# Patient Record
Sex: MALE | Race: WHITE | NOT HISPANIC OR LATINO | Employment: FULL TIME | URBAN - METROPOLITAN AREA
[De-identification: names, ages, dates, MRNs, and addresses within clinical notes are randomized per-mention and may not be internally consistent; named-entity substitution may affect disease eponyms.]

---

## 2018-05-02 ENCOUNTER — CONVERSION ENCOUNTER (OUTPATIENT)
Dept: FAMILY MEDICINE CLINIC | Facility: CLINIC | Age: 38
End: 2018-05-02

## 2018-05-02 ENCOUNTER — OFFICE VISIT CONVERTED (OUTPATIENT)
Dept: FAMILY MEDICINE CLINIC | Facility: CLINIC | Age: 38
End: 2018-05-02
Attending: NURSE PRACTITIONER

## 2020-01-09 ENCOUNTER — HOSPITAL ENCOUNTER (OUTPATIENT)
Dept: URGENT CARE | Facility: CLINIC | Age: 40
Discharge: HOME | End: 2020-01-09
Attending: EMERGENCY MEDICINE

## 2020-01-09 LAB
ANION GAP SERPL CALC-SCNC: 14 MMOL/L (ref 8–19)
BUN SERPL-MCNC: 3 MG/DL (ref 5–25)
BUN/CREAT SERPL: 3 {RATIO} (ref 6–20)
CALCIUM SERPL-MCNC: 9.5 MG/DL (ref 8.7–10.4)
CHLORIDE SERPL-SCNC: 101 MMOL/L (ref 99–111)
CONV CO2: 26 MMOL/L (ref 22–32)
CREAT UR-MCNC: 0.87 MG/DL (ref 0.7–1.2)
GFR SERPLBLD BASED ON 1.73 SQ M-ARVRAT: >60 ML/MIN/{1.73_M2}
GLUCOSE SERPL-MCNC: 93 MG/DL (ref 70–99)
OSMOLALITY SERPL CALC.SUM OF ELEC: 280 MOSM/KG (ref 273–304)
POTASSIUM SERPL-SCNC: 4.2 MMOL/L (ref 3.5–5.3)
SODIUM SERPL-SCNC: 137 MMOL/L (ref 135–147)

## 2021-05-16 VITALS
TEMPERATURE: 97.2 F | SYSTOLIC BLOOD PRESSURE: 140 MMHG | DIASTOLIC BLOOD PRESSURE: 86 MMHG | WEIGHT: 197.44 LBS | RESPIRATION RATE: 14 BRPM | HEIGHT: 70 IN | BODY MASS INDEX: 28.27 KG/M2 | HEART RATE: 98 BPM | SYSTOLIC BLOOD PRESSURE: 151 MMHG | OXYGEN SATURATION: 96 % | DIASTOLIC BLOOD PRESSURE: 87 MMHG

## 2021-08-23 ENCOUNTER — HOSPITAL ENCOUNTER (EMERGENCY)
Facility: HOSPITAL | Age: 41
Discharge: HOME | End: 2021-08-23
Attending: EMERGENCY MEDICINE | Admitting: EMERGENCY MEDICINE

## 2021-08-23 VITALS
HEART RATE: 92 BPM | SYSTOLIC BLOOD PRESSURE: 140 MMHG | OXYGEN SATURATION: 97 % | BODY MASS INDEX: 28.44 KG/M2 | TEMPERATURE: 98.1 F | RESPIRATION RATE: 16 BRPM | DIASTOLIC BLOOD PRESSURE: 90 MMHG | WEIGHT: 210 LBS | HEIGHT: 72 IN

## 2021-08-23 DIAGNOSIS — Z20.822 SUSPECTED COVID-19 VIRUS INFECTION: Primary | ICD-10-CM

## 2021-08-23 PROCEDURE — 99283 EMERGENCY DEPT VISIT LOW MDM: CPT

## 2021-08-23 PROCEDURE — C9803 HOPD COVID-19 SPEC COLLECT: HCPCS

## 2021-08-23 PROCEDURE — U0003 INFECTIOUS AGENT DETECTION BY NUCLEIC ACID (DNA OR RNA); SEVERE ACUTE RESPIRATORY SYNDROME CORONAVIRUS 2 (SARS-COV-2) (CORONAVIRUS DISEASE [COVID-19]), AMPLIFIED PROBE TECHNIQUE, MAKING USE OF HIGH THROUGHPUT TECHNOLOGIES AS DESCRIBED BY CMS-2020-01-R: HCPCS | Performed by: EMERGENCY MEDICINE

## 2021-08-23 RX ORDER — ONDANSETRON 4 MG/1
4 TABLET, ORALLY DISINTEGRATING ORAL 4 TIMES DAILY PRN
Qty: 10 TABLET | Refills: 0 | OUTPATIENT
Start: 2021-08-23 | End: 2023-02-17

## 2021-08-23 RX ORDER — ONDANSETRON 4 MG/1
4 TABLET, ORALLY DISINTEGRATING ORAL ONCE
Status: DISCONTINUED | OUTPATIENT
Start: 2021-08-23 | End: 2021-08-24 | Stop reason: HOSPADM

## 2021-08-23 RX ORDER — SODIUM CHLORIDE 0.9 % (FLUSH) 0.9 %
10 SYRINGE (ML) INJECTION AS NEEDED
Status: DISCONTINUED | OUTPATIENT
Start: 2021-08-23 | End: 2021-08-24 | Stop reason: HOSPADM

## 2021-08-24 LAB — SARS-COV-2 RNA RESP QL NAA+PROBE: NOT DETECTED

## 2021-08-24 NOTE — ED PROVIDER NOTES
"Time: 9:17 PM EDT  Arrived by: private car  Chief Complaint:   Chief Complaint   Patient presents with   • Abdominal Pain     abdominal pain for two days   • Weakness - Generalized   • Muscle Pain   • Vomiting   • Nausea     History provided by: pt  History is limited by: N/A     History of Present Illness:  Patient is a 41 y.o. year old male that presents to the emergency department with ABDOMINAL PAIN.    Pt states two days ago he began having diarrhea, nausea, and fatigue. Pt also complains of cough, but denies any SOB. Pt has intermittent abdominal pain.   Pt is a smoker. Pt has known contact with coworkers who may be ill. Pt did not receive the COVID-19 vaccine. Pt has hx of Crohn's.      History provided by:  Patient   used: No    Abdominal Pain  Pain location:  Generalized  Pain quality comment:  \"pain\"  Pain radiates to:  Does not radiate  Pain severity:  Moderate  Onset quality:  Gradual  Duration:  2 days  Timing:  Intermittent  Progression:  Unchanged  Chronicity:  New  Relieved by:  None tried  Worsened by:  Nothing  Ineffective treatments:  None tried  Associated symptoms: cough, diarrhea, fatigue and nausea    Associated symptoms: no chest pain, no chills, no dysuria, no fever, no hematuria, no shortness of breath, no sore throat and no vomiting    Weakness - Generalized  Associated symptoms: abdominal pain, cough, diarrhea and nausea    Associated symptoms: no chest pain, no dizziness, no dysuria, no fever, no headaches, no seizures, no shortness of breath and no vomiting    Muscle Pain  Associated symptoms: abdominal pain, cough, diarrhea, fatigue and nausea    Associated symptoms: no chest pain, no congestion, no ear pain, no fever, no headaches, no rash, no rhinorrhea, no shortness of breath, no sore throat and no vomiting    Vomiting  The primary symptoms include fatigue, abdominal pain, nausea and diarrhea. Primary symptoms do not include fever, vomiting, dysuria or rash. " "  The illness does not include chills or back pain.   Nausea  The primary symptoms include fatigue, abdominal pain, nausea and diarrhea. Primary symptoms do not include fever, vomiting, dysuria or rash.   The illness does not include chills or back pain.       Similar Symptoms Previously: none  Recently seen: not recently seen in this ED    Patient Care Team  Primary Care Provider: Provider, No Known    Past Medical History:     No Known Allergies  No past medical history on file.  No past surgical history on file.  No family history on file.    Home Medications:  Prior to Admission medications    Not on File        Social History:   Social History     Tobacco Use   • Smoking status: Not on file   Substance Use Topics   • Alcohol use: Not on file   • Drug use: Not on file          Review of Systems:  Review of Systems   Constitutional: Positive for fatigue. Negative for chills and fever.   HENT: Negative for congestion, ear pain, rhinorrhea, sore throat and tinnitus.    Eyes: Negative for photophobia and pain.   Respiratory: Positive for cough. Negative for choking and shortness of breath.    Cardiovascular: Negative for chest pain, palpitations and leg swelling.   Gastrointestinal: Positive for abdominal pain, diarrhea and nausea. Negative for abdominal distention and vomiting.   Endocrine: Negative for polydipsia.   Genitourinary: Negative for difficulty urinating, dysuria and hematuria.   Musculoskeletal: Negative for back pain, gait problem and neck pain.   Skin: Negative for rash.   Neurological: Negative for dizziness, seizures, speech difficulty, weakness, light-headedness, numbness and headaches.   Hematological: Negative for adenopathy.   Psychiatric/Behavioral: Negative for agitation, confusion, self-injury and suicidal ideas.        Physical Exam:  /90 (BP Location: Right arm, Patient Position: Sitting)   Pulse 92   Temp 98.1 °F (36.7 °C) (Oral)   Resp 16   Ht 182.9 cm (72\")   Wt 95.3 kg (210 " lb)   SpO2 97%   BMI 28.48 kg/m²     Physical Exam  Vitals and nursing note reviewed.   Constitutional:       Appearance: Normal appearance. He is well-developed.   HENT:      Head: Normocephalic and atraumatic.      Right Ear: External ear normal.      Left Ear: External ear normal.      Nose: Nose normal.      Mouth/Throat:      Mouth: Mucous membranes are moist.      Pharynx: Oropharynx is clear.   Eyes:      General: Lids are normal.      Extraocular Movements: Extraocular movements intact.      Conjunctiva/sclera: Conjunctivae normal.      Pupils: Pupils are equal, round, and reactive to light.   Cardiovascular:      Rate and Rhythm: Normal rate and regular rhythm.      Pulses: Normal pulses.      Heart sounds: Normal heart sounds. No murmur heard.     Pulmonary:      Effort: Pulmonary effort is normal.      Breath sounds: Normal breath sounds. No stridor. No wheezing.   Abdominal:      Palpations: Abdomen is soft.      Tenderness: There is no abdominal tenderness.   Musculoskeletal:         General: Normal range of motion.      Cervical back: Full passive range of motion without pain, normal range of motion and neck supple.      Right lower leg: No edema.      Left lower leg: No edema.   Skin:     General: Skin is warm and dry.      Capillary Refill: Capillary refill takes less than 2 seconds.   Neurological:      General: No focal deficit present.      Mental Status: He is alert and oriented to person, place, and time. Mental status is at baseline.      Cranial Nerves: Cranial nerves are intact.      Sensory: Sensation is intact.      Motor: Motor function is intact.      Coordination: Coordination is intact.   Psychiatric:         Attention and Perception: Attention and perception normal.         Mood and Affect: Mood and affect normal.         Speech: Speech normal.         Behavior: Behavior normal. Behavior is cooperative.         Thought Content: Thought content normal.         Cognition and Memory:  Cognition and memory normal.                Medications in the Emergency Department:  Medications   sodium chloride 0.9 % flush 10 mL (has no administration in time range)   ondansetron ODT (ZOFRAN-ODT) disintegrating tablet 4 mg (has no administration in time range)        Labs  Lab Results (last 24 hours)     ** No results found for the last 24 hours. **           Imaging:  No Radiology Exams Resulted Within Past 24 Hours    Procedures:  Procedures    Progress                            Medical Decision Making:  MDM   COVID 19 test performed in ED, results pending.     I have provided education on COVID-19 and viral illnesses to this patient, and educated them on when they should return to their primary care provider or the emergency department itself should their symptoms worsen. On re-examination, the patient does not appear toxic and has no meningeal signs (including a negative Kernig and Brudzinski sign), and there´s no intractable vomiting, respiratory distress and no apparent pain. Based on the history, exam, diagnostic testing and reassessment, the patient has no signs of meningitis, significant pneumonia, pyelonephritis, sepsis or other acute serious bacterial infections, or other significant pathology to warrant further testing, continued ED treatment, admission or specialist evaluation. On re-examination, the patient does not appear toxic and has no meningeal signs (including a negative Kernig and Brudzinski sign), and there´s no intractable vomiting, respiratory distress and no apparent pain. Based on the history, exam, diagnostic testing and reassessment, the patient has no signs of meningitis, significant pneumonia, pyelonephritis, sepsis or other acute serious bacterial infections, or other significant pathology to warrant further testing, continued ED treatment, admission or specialist evaluation. They verbalized understanding of this diagnosis, my treatment plant, and recommendations for follow up.  The patient was counseled to return to the ED for reevaluation for worsening cough, shortness of breath, uncontrollable headache, uncontrollable fever, altered mental status, or any symptoms which caused them concern.     Discussed importance of self-quarantine until results are obtained.  Final diagnoses:   Suspected COVID-19 virus infection        Disposition:  ED Disposition     ED Disposition Condition Comment    Discharge Stable           Documentation assistance provided by Tamera Roman acting as scribe for Taqueria Modi MD. Information recorded by the scribe was done at my direction and has been verified and validated by me.          Tamera Roman  08/23/21 2988       Taqueria Modi MD  08/25/21 3399

## 2023-03-02 ENCOUNTER — OFFICE VISIT (OUTPATIENT)
Dept: FAMILY MEDICINE CLINIC | Facility: CLINIC | Age: 43
End: 2023-03-02
Payer: COMMERCIAL

## 2023-03-02 ENCOUNTER — LAB (OUTPATIENT)
Dept: LAB | Facility: HOSPITAL | Age: 43
End: 2023-03-02
Payer: COMMERCIAL

## 2023-03-02 ENCOUNTER — HOSPITAL ENCOUNTER (OUTPATIENT)
Dept: GENERAL RADIOLOGY | Facility: HOSPITAL | Age: 43
Discharge: HOME | End: 2023-03-02
Payer: COMMERCIAL

## 2023-03-02 VITALS
DIASTOLIC BLOOD PRESSURE: 91 MMHG | TEMPERATURE: 98.5 F | HEART RATE: 90 BPM | OXYGEN SATURATION: 97 % | BODY MASS INDEX: 25.86 KG/M2 | WEIGHT: 190.9 LBS | SYSTOLIC BLOOD PRESSURE: 135 MMHG | HEIGHT: 72 IN

## 2023-03-02 DIAGNOSIS — Z23 NEED FOR TDAP VACCINATION: ICD-10-CM

## 2023-03-02 DIAGNOSIS — H20.9 UVEITIS OF RIGHT EYE: ICD-10-CM

## 2023-03-02 DIAGNOSIS — Z87.01 HISTORY OF PNEUMONIA: ICD-10-CM

## 2023-03-02 DIAGNOSIS — K50.919 CROHN'S DISEASE WITH COMPLICATION, UNSPECIFIED GASTROINTESTINAL TRACT LOCATION: ICD-10-CM

## 2023-03-02 DIAGNOSIS — Z00.00 ANNUAL PHYSICAL EXAM: Primary | ICD-10-CM

## 2023-03-02 DIAGNOSIS — F17.219 CIGARETTE NICOTINE DEPENDENCE WITH NICOTINE-INDUCED DISORDER: ICD-10-CM

## 2023-03-02 DIAGNOSIS — Z00.00 ANNUAL PHYSICAL EXAM: ICD-10-CM

## 2023-03-02 PROBLEM — K52.3 INDETERMINATE COLITIS: Status: ACTIVE | Noted: 2023-03-02

## 2023-03-02 PROBLEM — F32.A DEPRESSION: Status: ACTIVE | Noted: 2023-03-02

## 2023-03-02 PROBLEM — D64.9 ANEMIA: Status: ACTIVE | Noted: 2023-03-02

## 2023-03-02 PROBLEM — K64.9 HEMORRHOIDS: Status: ACTIVE | Noted: 2023-03-02

## 2023-03-02 PROBLEM — R61 NIGHT SWEATS: Status: ACTIVE | Noted: 2023-03-02

## 2023-03-02 PROBLEM — N20.0 KIDNEY CALCULUS: Status: ACTIVE | Noted: 2023-03-02

## 2023-03-02 PROBLEM — R17 JAUNDICE: Status: ACTIVE | Noted: 2023-03-02

## 2023-03-02 PROBLEM — K21.9 ACID REFLUX: Status: ACTIVE | Noted: 2023-03-02

## 2023-03-02 PROBLEM — K50.90 CROHN'S DISEASE: Status: ACTIVE | Noted: 2023-03-02

## 2023-03-02 LAB
ALBUMIN SERPL-MCNC: 4.3 G/DL (ref 3.5–5.2)
ALBUMIN/GLOB SERPL: 1.3 G/DL
ALP SERPL-CCNC: 177 U/L (ref 39–117)
ALT SERPL W P-5'-P-CCNC: 30 U/L (ref 1–41)
ANION GAP SERPL CALCULATED.3IONS-SCNC: 6.7 MMOL/L (ref 5–15)
AST SERPL-CCNC: 22 U/L (ref 1–40)
BASOPHILS # BLD AUTO: 0.05 10*3/MM3 (ref 0–0.2)
BASOPHILS NFR BLD AUTO: 0.4 % (ref 0–1.5)
BILIRUB SERPL-MCNC: 0.4 MG/DL (ref 0–1.2)
BUN SERPL-MCNC: 4 MG/DL (ref 6–20)
BUN/CREAT SERPL: 4.3 (ref 7–25)
CALCIUM SPEC-SCNC: 9.5 MG/DL (ref 8.6–10.5)
CHLORIDE SERPL-SCNC: 102 MMOL/L (ref 98–107)
CHOLEST SERPL-MCNC: 142 MG/DL (ref 0–200)
CHROMATIN AB SERPL-ACNC: <10 IU/ML (ref 0–14)
CO2 SERPL-SCNC: 28.3 MMOL/L (ref 22–29)
CREAT SERPL-MCNC: 0.92 MG/DL (ref 0.76–1.27)
CRP SERPL-MCNC: 0.55 MG/DL (ref 0–0.5)
DEPRECATED RDW RBC AUTO: 41.6 FL (ref 37–54)
EGFRCR SERPLBLD CKD-EPI 2021: 107.8 ML/MIN/1.73
EOSINOPHIL # BLD AUTO: 0.28 10*3/MM3 (ref 0–0.4)
EOSINOPHIL NFR BLD AUTO: 2.2 % (ref 0.3–6.2)
ERYTHROCYTE [DISTWIDTH] IN BLOOD BY AUTOMATED COUNT: 13.8 % (ref 12.3–15.4)
ERYTHROCYTE [SEDIMENTATION RATE] IN BLOOD: 33 MM/HR (ref 0–15)
GLOBULIN UR ELPH-MCNC: 3.3 GM/DL
GLUCOSE SERPL-MCNC: 97 MG/DL (ref 65–99)
HCT VFR BLD AUTO: 50.3 % (ref 37.5–51)
HCV AB SER DONR QL: NORMAL
HDLC SERPL-MCNC: 30 MG/DL (ref 40–60)
HGB BLD-MCNC: 17.4 G/DL (ref 13–17.7)
IMM GRANULOCYTES # BLD AUTO: 0.04 10*3/MM3 (ref 0–0.05)
IMM GRANULOCYTES NFR BLD AUTO: 0.3 % (ref 0–0.5)
LDLC SERPL CALC-MCNC: 88 MG/DL (ref 0–100)
LDLC/HDLC SERPL: 2.84 {RATIO}
LYMPHOCYTES # BLD AUTO: 3.3 10*3/MM3 (ref 0.7–3.1)
LYMPHOCYTES NFR BLD AUTO: 25.7 % (ref 19.6–45.3)
MCH RBC QN AUTO: 28.9 PG (ref 26.6–33)
MCHC RBC AUTO-ENTMCNC: 34.6 G/DL (ref 31.5–35.7)
MCV RBC AUTO: 83.4 FL (ref 79–97)
MONOCYTES # BLD AUTO: 0.99 10*3/MM3 (ref 0.1–0.9)
MONOCYTES NFR BLD AUTO: 7.7 % (ref 5–12)
NEUTROPHILS NFR BLD AUTO: 63.7 % (ref 42.7–76)
NEUTROPHILS NFR BLD AUTO: 8.2 10*3/MM3 (ref 1.7–7)
NRBC BLD AUTO-RTO: 0 /100 WBC (ref 0–0.2)
PLATELET # BLD AUTO: 366 10*3/MM3 (ref 140–450)
PMV BLD AUTO: 10.2 FL (ref 6–12)
POTASSIUM SERPL-SCNC: 3.8 MMOL/L (ref 3.5–5.2)
PROT SERPL-MCNC: 7.6 G/DL (ref 6–8.5)
RBC # BLD AUTO: 6.03 10*6/MM3 (ref 4.14–5.8)
SODIUM SERPL-SCNC: 137 MMOL/L (ref 136–145)
T PALLIDUM IGG SER QL: NORMAL
T4 FREE SERPL-MCNC: 1.46 NG/DL (ref 0.93–1.7)
TRIGL SERPL-MCNC: 134 MG/DL (ref 0–150)
TSH SERPL DL<=0.05 MIU/L-ACNC: 3.18 UIU/ML (ref 0.27–4.2)
VLDLC SERPL-MCNC: 24 MG/DL (ref 5–40)
WBC NRBC COR # BLD: 12.86 10*3/MM3 (ref 3.4–10.8)

## 2023-03-02 PROCEDURE — 36415 COLL VENOUS BLD VENIPUNCTURE: CPT

## 2023-03-02 PROCEDURE — 71046 X-RAY EXAM CHEST 2 VIEWS: CPT

## 2023-03-02 PROCEDURE — 86038 ANTINUCLEAR ANTIBODIES: CPT

## 2023-03-02 PROCEDURE — 80050 GENERAL HEALTH PANEL: CPT

## 2023-03-02 PROCEDURE — 85652 RBC SED RATE AUTOMATED: CPT

## 2023-03-02 PROCEDURE — 86140 C-REACTIVE PROTEIN: CPT

## 2023-03-02 PROCEDURE — 86592 SYPHILIS TEST NON-TREP QUAL: CPT

## 2023-03-02 PROCEDURE — 90471 IMMUNIZATION ADMIN: CPT | Performed by: NURSE PRACTITIONER

## 2023-03-02 PROCEDURE — 86431 RHEUMATOID FACTOR QUANT: CPT

## 2023-03-02 PROCEDURE — 90715 TDAP VACCINE 7 YRS/> IM: CPT | Performed by: NURSE PRACTITIONER

## 2023-03-02 PROCEDURE — 86780 TREPONEMA PALLIDUM: CPT

## 2023-03-02 PROCEDURE — 81374 HLA I TYPING 1 ANTIGEN LR: CPT

## 2023-03-02 PROCEDURE — 84439 ASSAY OF FREE THYROXINE: CPT

## 2023-03-02 PROCEDURE — 80061 LIPID PANEL: CPT

## 2023-03-02 PROCEDURE — 86803 HEPATITIS C AB TEST: CPT

## 2023-03-02 PROCEDURE — 99204 OFFICE O/P NEW MOD 45 MIN: CPT | Performed by: NURSE PRACTITIONER

## 2023-03-02 RX ORDER — PREDNISOLONE ACETATE 10 MG/ML
1 SUSPENSION/ DROPS OPHTHALMIC
COMMUNITY
Start: 2023-02-17

## 2023-03-02 NOTE — PROGRESS NOTES
"Chief Complaint  Annual Exam and Establish Care    Subjective          Chris Dahl, 42 y.o. male presents to [facility]  History of Present Illness   Patient presents today as a new patient to establish care and for an annual physical.    He was sent by his optometrist, Dr. Alfonso Pena due to diagnosis of uveitis.  He is needing lab work-up.  He does have Crohn's disease, currently not on any medications, currently not having any issues.  He has not seen gastroenterology in a long time.  He did have a previous chest x-ray on 9/21/2022 and had pneumonia.    PHQ-2 Depression Screening  Little interest or pleasure in doing things? 0-->not at all   Feeling down, depressed, or hopeless? 0-->not at all   PHQ-2 Total Score 0        Tobacco Use: High Risk   • Smoking Tobacco Use: Every Day   • Smokeless Tobacco Use: Never   • Passive Exposure: Not on file      Chris Dahl  reports that he has been smoking cigarettes. He has a 37.50 pack-year smoking history. He has never used smokeless tobacco.. I have educated him on the risk of diseases from using tobacco products such as cancer, COPD, heart disease and cataracts.     I advised him to quit and he is not willing to quit.    I spent 5 minutes counseling the patient.    Objective   Vital Signs:   /91 (BP Location: Left arm, Patient Position: Sitting, Cuff Size: Adult)   Pulse 90   Temp 98.5 °F (36.9 °C)   Ht 182.9 cm (72\")   Wt 86.6 kg (190 lb 14.4 oz)   SpO2 97%   BMI 25.89 kg/m²       Current Outpatient Medications:   •  prednisoLONE acetate (PRED FORTE) 1 % ophthalmic suspension, Administer 1 drop to both eyes Every 2 (Two) Hours., Disp: , Rfl:    Past Medical History:   Diagnosis Date   • Crohn's disease (HCC)       Physical Exam  Vitals reviewed.   Constitutional:       Appearance: Normal appearance. He is well-developed.   HENT:      Right Ear: Tympanic membrane, ear canal and external ear normal.      Left Ear: " Tympanic membrane, ear canal and external ear normal.      Mouth/Throat:      Mouth: Mucous membranes are moist.      Dentition: Dental caries present.      Pharynx: No pharyngeal swelling, oropharyngeal exudate or posterior oropharyngeal erythema.      Comments: Poor dentition.  Eyes:      General: Lids are normal.         Right eye: Discharge present.      Conjunctiva/sclera:      Right eye: Right conjunctiva is not injected.      Left eye: Left conjunctiva is not injected.   Neck:      Thyroid: No thyroid mass, thyromegaly or thyroid tenderness.   Cardiovascular:      Rate and Rhythm: Normal rate and regular rhythm.      Heart sounds: No murmur heard.    No friction rub. No gallop.   Pulmonary:      Effort: Pulmonary effort is normal.      Breath sounds: Normal breath sounds. No wheezing or rhonchi.   Lymphadenopathy:      Cervical: No cervical adenopathy.   Skin:     General: Skin is warm and dry.   Neurological:      Mental Status: He is alert and oriented to person, place, and time.      Cranial Nerves: No cranial nerve deficit.   Psychiatric:         Mood and Affect: Mood and affect normal.         Behavior: Behavior normal.         Thought Content: Thought content normal. Thought content does not include homicidal or suicidal ideation.         Judgment: Judgment normal.       Patient was also seen by Jodie Escamilla, [employer] student.    Result Review :   {The following data was reviewed by VENECIA Hsieh    PROCEDURE:  XR CHEST 2 VW     COMPARISON: None     INDICATIONS:  cough, high fever >104, tobacco abuse history with coarse rhonchi diffusely     FINDINGS:             The lungs are well-expanded. The heart and pulmonary vasculature are within normal limits. No   pleural effusions are identified. There is mild linear airspace consolidation in the left midlung   field.     IMPRESSION:  Mild linear airspace consolidation in the left mid possibly secondary to   scar/atelectasis or pneumonia.     CHRISTINA  PETER LANDA MD         Electronically Signed and Approved By: CHRISTINA LANDA MD on 9/21/2022 at 14:15         Component  Ref Range & Units 2 yr ago 3 yr ago   Glucose  70 - 99 mg/dL 86  93    BUN  5 - 25 mg/dL 7  3 Low     Creatinine  0.70 - 1.20 mg/dL 1.07  0.87    BUN/Creatinine Ratio  6 - 20  7  3 Low     GFR  >60 mL/min/ >60  >60 CM    Comment: Interpretative Data:   ------------------------------------   STAGE                  GFR   Stage 1                90 mL/min or greater   Stage 2                60-89 mL/min   Stage 3                30-59 mL/min   Stage 4                15-29 mL/min   Value <60 mL/min for 3 or more months is defined as CKD.    Sodium  135 - 147 mmol/L 143  137    Potassium  3.5 - 5.3 mmol/L 4.1  4.2    Chloride  99 - 111 mmol/L 106  101    CO2  22 - 32 mmol/L 27  26    Anion Gap  8 - 19 mmol/L 14  14    OSMOLALITY CALC  273 - 304 293  280    Total Protein  6.3 - 8.2 g/dL 6.9     Comment: If Patient is receiving dextran as a blood volume expander   result may show a potential interference.    Albumin  3.5 - 5.0 g/dL 4.2     Globulin  2.0 - 3.5 g/dL 2.7     A/G Ratio  1.4 - 2.6  1.6     Calcium  8.7 - 10.4 mg/dL 9.1  9.5    Alkaline Phosphatase  53 - 128 U/L 112     ALT (SGPT)  10 - 40 U/L 14     AST (SGOT)  15 - 50 U/L 15     Total Bilirubin  0.20 - 1.30 mg/dL 0.23          Component  Ref Range & Units 2 yr ago   WBC  4.80 - 10.80 10*3/uL 12.62 High     RBC  4.70 - 6.10 10*6/uL 5.67    Hemoglobin  14.0 - 18.0 g/dL 16.3    Hematocrit  42.0 - 52.0 % 50.2    MCV  80.0 - 96.0 fL 88.5    MCH  27.0 - 31.0 pg 28.7    MCHC  33.0 - 37.0 32.5 Low     RDW  11.6 - 14.4 % 13.8    Platelets  130 - 400 10*3/uL 306    MPV  9.4 - 12.4 fL 9.8    Neutrophil Rel %  30.0 - 85.0 % 60.4    Lymphocyte Rel %  20.0 - 45.0 % 27.0    Monocyte Rel %  3.0 - 10.0 % 7.7    Eosinophil Rel %  0.0 - 7.0 % 4.1    Basophil Rel %  0.0 - 3.0 % 0.4    Neutrophils Absolute  2.00 - 8.00 10*3/uL 7.62    Lymphocytes Absolute  1.00 -  5.00 10*3/uL 3.41    Monocytes Absolute  0.20 - 1.20 10*3/uL 0.97    Eosinophils Absolute  0.00 - 0.70 10*3/uL 0.52    Basophils Absolute  0.00 - 0.20 10*3/uL 0.05    Immature Granulocyte Rel %  0.0 - 1.8 % 0.4    Abs Imm Gran  0.00 - 0.20 10*3/uL 0.05    RDW-SD  35.1 - 43.9 fL 44.5 High     NRBC  0.00 - 0.70 % 0.00           Assessment and Plan    Diagnoses and all orders for this visit:    1. Annual physical exam (Primary)  -     TSH+Free T4; Future  -     CBC Auto Differential; Future  -     Comprehensive Metabolic Panel; Future  -     Lipid Panel; Future    2. Uveitis of right eye  Assessment & Plan:  Patient is following with optometrist.  Lab work-up ordered today, will notify patient of results.  Follow-up is pending lab results.  We will plan to fax results and office note to Dr. Alfonso Pena, OD.    Orders:  -     Rheumatoid Factor; Future  -     MARISSA With / DsDNA, RNP, Sjogrens A / B, Hoover; Future  -     Sedimentation Rate; Future  -     C-reactive Protein; Future  -     Hepatitis C Antibody; Future  -     RPR; Future  -     CBC Auto Differential; Future  -     Comprehensive Metabolic Panel; Future  -     HLA-B27 Antigen; Future  -     T PALLIDUM AB (FTA-AB); Future  -     XR Chest PA & Lateral; Future    3. Crohn's disease with complication, unspecified gastrointestinal tract location (HCC)  Assessment & Plan:  I discussed with him that he needs to have a referral to gastroenterology to monitor his Crohn's disease.  He has seen Dr. Blackman in the past.  Patient is agreeable, order placed.    Orders:  -     Ambulatory Referral to Gastroenterology    4. Cigarette nicotine dependence with nicotine-induced disorder  Assessment & Plan:  Tobacco use is newly identified.  Patient is not ready to quit smoking at this time.  Tobacco use will be reassessed at the next regular appointment.    Orders:  -     XR Chest PA & Lateral; Future    5. History of pneumonia  Assessment & Plan:  Due to his history of  pneumonia, current smoker and diagnosis of uveitis, I will have him get a repeat chest x-ray.    Orders:  -     XR Chest PA & Lateral; Future    6. Need for Tdap vaccination  -     Tdap Vaccine Greater Than or Equal To 8yo IM    Discussed and reviewed Colorectal Cancer Screening, Colonoscopy  Counseling to Prevent Tobacco Use (use of smartset and @cessation@ smartphrase for documentation)  Hepatitis C Virus Screening  BH AMB IMMUNIZATIONS: Tdap discussed and administered today.      Follow Up   Return for Will determine follow up after reviewing labs.  Patient was given instructions and counseling regarding his condition or for health maintenance advice. Please see specific information pulled into the AVS if appropriate.     Parts of this note are electronic transcriptions/translations of spoken language to printed text using the Dragon Dictation system.      Catherine Roach, APRN  03/02/2023

## 2023-03-02 NOTE — ASSESSMENT & PLAN NOTE
Tobacco use is newly identified.  Patient is not ready to quit smoking at this time.  Tobacco use will be reassessed at the next regular appointment.

## 2023-03-02 NOTE — ASSESSMENT & PLAN NOTE
Due to his history of pneumonia, current smoker and diagnosis of uveitis, I will have him get a repeat chest x-ray.

## 2023-03-02 NOTE — ASSESSMENT & PLAN NOTE
Patient is following with optometrist.  Lab work-up ordered today, will notify patient of results.  Follow-up is pending lab results.  We will plan to fax results and office note to Dr. Alfonso Pena OD.

## 2023-03-03 ENCOUNTER — PATIENT ROUNDING (BHMG ONLY) (OUTPATIENT)
Dept: FAMILY MEDICINE CLINIC | Facility: CLINIC | Age: 43
End: 2023-03-03
Payer: COMMERCIAL

## 2023-03-03 LAB
ANA SER QL: NEGATIVE
RPR SER QL: NON REACTIVE

## 2023-03-03 NOTE — PROGRESS NOTES
Date: 3/3/23      LVM to contact our office       Hello, may I speak with ?    My name is      I am  with [facility]  [address] [place] [address] [phone].    Before we get started may I verify your date of birth?     I am calling to officially welcome you to our practice and ask about your recent visit. Is this a good time to talk?     Tell me about your visit with us. What things went well?         We're always looking for ways to make our patients' experiences even better. Do you have recommendations on ways we may improve?      Overall were you satisfied with your first visit to our practice?        I appreciate you taking the time to speak with me today. Is there anything else I can do for you?     Thank you, and have a great day.

## 2023-03-10 LAB — HLA-B27 QL NAA+PROBE: NEGATIVE

## 2023-06-15 ENCOUNTER — LAB (OUTPATIENT)
Dept: LAB | Facility: HOSPITAL | Age: 43
End: 2023-06-15
Payer: COMMERCIAL

## 2023-06-15 ENCOUNTER — OFFICE VISIT (OUTPATIENT)
Dept: GASTROENTEROLOGY | Facility: CLINIC | Age: 43
End: 2023-06-15
Payer: COMMERCIAL

## 2023-06-15 ENCOUNTER — PREP FOR SURGERY (OUTPATIENT)
Dept: GASTROENTEROLOGY | Facility: CLINIC | Age: 43
End: 2023-06-15

## 2023-06-15 VITALS
BODY MASS INDEX: 26.55 KG/M2 | DIASTOLIC BLOOD PRESSURE: 89 MMHG | HEART RATE: 83 BPM | SYSTOLIC BLOOD PRESSURE: 140 MMHG | WEIGHT: 196 LBS | OXYGEN SATURATION: 95 % | HEIGHT: 72 IN

## 2023-06-15 DIAGNOSIS — R74.8 ELEVATED ALKALINE PHOSPHATASE LEVEL: ICD-10-CM

## 2023-06-15 DIAGNOSIS — H20.9 UVEITIS: ICD-10-CM

## 2023-06-15 DIAGNOSIS — Z90.49 HISTORY OF COLON RESECTION: ICD-10-CM

## 2023-06-15 DIAGNOSIS — K50.919 CROHN'S DISEASE WITH COMPLICATION, UNSPECIFIED GASTROINTESTINAL TRACT LOCATION: Primary | ICD-10-CM

## 2023-06-15 DIAGNOSIS — K50.919 CROHN'S DISEASE WITH COMPLICATION, UNSPECIFIED GASTROINTESTINAL TRACT LOCATION: ICD-10-CM

## 2023-06-15 LAB
ALBUMIN SERPL-MCNC: 3.9 G/DL (ref 3.5–5.2)
ALBUMIN/GLOB SERPL: 1.3 G/DL
ALP SERPL-CCNC: 150 U/L (ref 39–117)
ALT SERPL W P-5'-P-CCNC: 24 U/L (ref 1–41)
ANION GAP SERPL CALCULATED.3IONS-SCNC: 9.7 MMOL/L (ref 5–15)
AST SERPL-CCNC: 17 U/L (ref 1–40)
BASOPHILS # BLD AUTO: 0.07 10*3/MM3 (ref 0–0.2)
BASOPHILS NFR BLD AUTO: 0.7 % (ref 0–1.5)
BILIRUB SERPL-MCNC: <0.2 MG/DL (ref 0–1.2)
BUN SERPL-MCNC: 6 MG/DL (ref 6–20)
BUN/CREAT SERPL: 7.1 (ref 7–25)
CALCIUM SPEC-SCNC: 9.6 MG/DL (ref 8.6–10.5)
CHLORIDE SERPL-SCNC: 104 MMOL/L (ref 98–107)
CO2 SERPL-SCNC: 25.3 MMOL/L (ref 22–29)
CREAT SERPL-MCNC: 0.84 MG/DL (ref 0.76–1.27)
CRP SERPL-MCNC: 0.52 MG/DL (ref 0–0.5)
DEPRECATED RDW RBC AUTO: 41.8 FL (ref 37–54)
EGFRCR SERPLBLD CKD-EPI 2021: 112.4 ML/MIN/1.73
EOSINOPHIL # BLD AUTO: 0.33 10*3/MM3 (ref 0–0.4)
EOSINOPHIL NFR BLD AUTO: 3.4 % (ref 0.3–6.2)
ERYTHROCYTE [DISTWIDTH] IN BLOOD BY AUTOMATED COUNT: 13.3 % (ref 12.3–15.4)
ERYTHROCYTE [SEDIMENTATION RATE] IN BLOOD: 18 MM/HR (ref 0–15)
GLOBULIN UR ELPH-MCNC: 3 GM/DL
GLUCOSE SERPL-MCNC: 89 MG/DL (ref 65–99)
HCT VFR BLD AUTO: 48.2 % (ref 37.5–51)
HGB BLD-MCNC: 16.3 G/DL (ref 13–17.7)
IMM GRANULOCYTES # BLD AUTO: 0.04 10*3/MM3 (ref 0–0.05)
IMM GRANULOCYTES NFR BLD AUTO: 0.4 % (ref 0–0.5)
IRON 24H UR-MRATE: 50 MCG/DL (ref 59–158)
IRON SATN MFR SERPL: 15 % (ref 20–50)
LYMPHOCYTES # BLD AUTO: 3.13 10*3/MM3 (ref 0.7–3.1)
LYMPHOCYTES NFR BLD AUTO: 31.8 % (ref 19.6–45.3)
MCH RBC QN AUTO: 29 PG (ref 26.6–33)
MCHC RBC AUTO-ENTMCNC: 33.8 G/DL (ref 31.5–35.7)
MCV RBC AUTO: 85.6 FL (ref 79–97)
MONOCYTES # BLD AUTO: 0.78 10*3/MM3 (ref 0.1–0.9)
MONOCYTES NFR BLD AUTO: 7.9 % (ref 5–12)
NEUTROPHILS NFR BLD AUTO: 5.5 10*3/MM3 (ref 1.7–7)
NEUTROPHILS NFR BLD AUTO: 55.8 % (ref 42.7–76)
NRBC BLD AUTO-RTO: 0 /100 WBC (ref 0–0.2)
PLATELET # BLD AUTO: 363 10*3/MM3 (ref 140–450)
PMV BLD AUTO: 10 FL (ref 6–12)
POTASSIUM SERPL-SCNC: 4.2 MMOL/L (ref 3.5–5.2)
PROT SERPL-MCNC: 6.9 G/DL (ref 6–8.5)
RBC # BLD AUTO: 5.63 10*6/MM3 (ref 4.14–5.8)
SODIUM SERPL-SCNC: 139 MMOL/L (ref 136–145)
TIBC SERPL-MCNC: 338 MCG/DL (ref 298–536)
TRANSFERRIN SERPL-MCNC: 227 MG/DL (ref 200–360)
WBC NRBC COR # BLD: 9.85 10*3/MM3 (ref 3.4–10.8)

## 2023-06-15 PROCEDURE — 80053 COMPREHEN METABOLIC PANEL: CPT

## 2023-06-15 PROCEDURE — 82607 VITAMIN B-12: CPT

## 2023-06-15 PROCEDURE — 82977 ASSAY OF GGT: CPT

## 2023-06-15 PROCEDURE — 85025 COMPLETE CBC W/AUTO DIFF WBC: CPT

## 2023-06-15 PROCEDURE — 86140 C-REACTIVE PROTEIN: CPT

## 2023-06-15 PROCEDURE — 36415 COLL VENOUS BLD VENIPUNCTURE: CPT

## 2023-06-15 PROCEDURE — 85652 RBC SED RATE AUTOMATED: CPT

## 2023-06-15 PROCEDURE — 82746 ASSAY OF FOLIC ACID SERUM: CPT

## 2023-06-15 PROCEDURE — 84466 ASSAY OF TRANSFERRIN: CPT

## 2023-06-15 PROCEDURE — 83540 ASSAY OF IRON: CPT

## 2023-06-15 RX ORDER — POLYETHYLENE GLYCOL 3350, SODIUM SULFATE ANHYDROUS, SODIUM BICARBONATE, SODIUM CHLORIDE, POTASSIUM CHLORIDE 227.1; 21.5; 6.36; 5.53; .754 G/L; G/L; G/L; G/L; G/L
4000 POWDER, FOR SOLUTION ORAL ONCE
Qty: 1 EACH | Refills: 0 | Status: SHIPPED | OUTPATIENT
Start: 2023-06-15 | End: 2023-06-15

## 2023-06-15 NOTE — PROGRESS NOTES
Chief Complaint  No chief complaint on file.    Chris Dahl is a 43 y.o. male who presents to [facility] on referral from ROEL Hsieh* for a gastroenterology evaluation of Crohn's.      History of Present Illness  Initially established care in 2013. History of Crohn's diagnosis 2004. He had a colon resection about 1 year after diagnosis in [place]. He previously managed Crohn's with Pent[facility]. Denies biologic exposure.   Colonoscopy 3/13/2013 by Dr. Blackman - evidence of right hemicolectomy, small hyperplastic polyp in rectum otherwise normal mucosa. Biopsies normal of terminal ileum, right colon, left colon, and rectum.   EGD/Colonoscopy 9/10/2015 by Dr. Blackman - LA grade D esophagitis with large ulcers in distal esophagus and hiatal hernia. Esophageal biopsies - active esophagitis with ulceration. Normal duodenum biopsies. Normal mucosa throughout colon with internal hemorrhoids and post surgical changes in right colon. Normal biopsies of right colon, left colon, and rectum.   EGD 1/07/2016 by Dr. Blackman - normal esophagus, hiatal hernia, normal stomach and duodenum. Esophageal biopsies - chronic inflammation.    Patient presents to the office for Crohn's. Patient seen by optometrist and diagnosed with uveitis, currently on steroid drops. He has no GI complaints. Reports normal bowel movements. Denies heartburn, nausea, vomiting, abdominal pain, dysphagia, constipation, diarrhea, melena, hematochezia, and unintentional weight loss.     Past Medical History:   Diagnosis Date    Crohn's disease        Past Surgical History:   Procedure Laterality Date    ABDOMINAL SURGERY      partial colonectomy    APPENDECTOMY      COLONOSCOPY           Current Outpatient Medications:     prednisoLONE acetate (PRED FORTE) 1 % ophthalmic suspension, Administer 1 drop to both eyes Every 2 (Two) Hours., Disp: , Rfl:     PEG 3350-KCl-NaBcb-NaCl-NaSulf (Golytely) 227.1 g pack,  "Take 4,000 mL by mouth 1 (One) Time for 1 dose. Take as directed by the office for colon prep, Disp: 1 each, Rfl: 0     No Known Allergies    Family History   Problem Relation Age of Onset    Crohn's disease Paternal Aunt     Colon cancer Neg Hx         Social History     Social History Narrative    Not on file       Immunization:  Immunization History   Administered Date(s) Administered    Tdap 03/02/2023        Objective     Vital Signs:   /89 (BP Location: Left arm, Patient Position: Sitting, Cuff Size: Adult)   Pulse 83   Ht 182.9 cm (72.01\")   Wt 88.9 kg (196 lb)   SpO2 95%   BMI 26.58 kg/m²       Physical Exam  Constitutional:       Appearance: Normal appearance.   HENT:      Head: Normocephalic.   Cardiovascular:      Rate and Rhythm: Normal rate and regular rhythm.      Heart sounds: Normal heart sounds.   Pulmonary:      Effort: Pulmonary effort is normal.      Breath sounds: Normal breath sounds.   Abdominal:      General: Bowel sounds are normal.      Palpations: Abdomen is soft.   Skin:     General: Skin is warm and dry.   Neurological:      Mental Status: He is alert and oriented to person, place, and time. Mental status is at baseline.   Psychiatric:         Mood and Affect: Mood normal.         Behavior: Behavior normal.         Thought Content: Thought content normal.         Judgment: Judgment normal.       Result Review :     CBC w/diff          3/2/2023    11:59   CBC w/Diff   WBC 12.86    RBC 6.03    Hemoglobin 17.4    Hematocrit 50.3    MCV 83.4    MCH 28.9    MCHC 34.6    RDW 13.8    Platelets 366    Neutrophil Rel % 63.7    Immature Granulocyte Rel % 0.3    Lymphocyte Rel % 25.7    Monocyte Rel % 7.7    Eosinophil Rel % 2.2    Basophil Rel % 0.4      CMP          3/2/2023    11:59   CMP   Glucose 97    BUN 4    Creatinine 0.92    EGFR 107.8    Sodium 137    Potassium 3.8    Chloride 102    Calcium 9.5    Total Protein 7.6    Albumin 4.3    Globulin 3.3    Total Bilirubin 0.4  "   Alkaline Phosphatase 177    AST (SGOT) 22    ALT (SGPT) 30    Albumin/Globulin Ratio 1.3    BUN/Creatinine Ratio 4.3    Anion Gap 6.7                    Assessment and Plan    Diagnoses and all orders for this visit:    1. Crohn's disease with complication, unspecified gastrointestinal tract location (Primary)  -     CBC Auto Differential; Future  -     Comprehensive Metabolic Panel; Future  -     Iron Profile; Future  -     C-reactive Protein; Future  -     Sedimentation Rate; Future  -     Vitamin B12 & Folate; Future    2. Uveitis    3. History of colon resection    Other orders  -     PEG 3350-KCl-NaBcb-NaCl-NaSulf (Golytely) 227.1 g pack; Take 4,000 mL by mouth 1 (One) Time for 1 dose. Take as directed by the office for colon prep  Dispense: 1 each; Refill: 0    Labs listed above.   Denies cardiopulmonary history.   COLONOSCOPY Surgical Risk and Benefits: Possible risk/complications, benefits, and alternatives to surgical or invasive procedure have been explained to patient and/or legal guardian. Risks include bleeding, infection, and perforation. Patient has been evaluated and can tolerate anesthesia and/or sedation. Risk, benefits, and alternatives to anesthesia and sedation have been explained to patient and/or legal guardian.     Follow Up   No follow-ups on file.  Patient was given instructions and counseling regarding his condition or for health maintenance advice. Please see specific information pulled into the AVS if appropriate.

## 2023-06-16 LAB
FOLATE SERPL-MCNC: 2.94 NG/ML (ref 4.78–24.2)
GGT SERPL-CCNC: 19 U/L (ref 8–61)
VIT B12 BLD-MCNC: 154 PG/ML (ref 211–946)

## 2023-06-16 RX ORDER — FERROUS SULFATE 325(65) MG
325 TABLET ORAL
Qty: 90 TABLET | Refills: 1 | Status: SHIPPED | OUTPATIENT
Start: 2023-06-16

## 2023-12-22 DIAGNOSIS — D50.9 IRON DEFICIENCY ANEMIA, UNSPECIFIED IRON DEFICIENCY ANEMIA TYPE: Primary | ICD-10-CM

## 2023-12-22 RX ORDER — FERROUS SULFATE 325(65) MG
1 TABLET ORAL
Qty: 90 TABLET | Refills: 1 | Status: SHIPPED | OUTPATIENT
Start: 2023-12-22

## 2023-12-22 NOTE — TELEPHONE ENCOUNTER
Refill sent to pharmacy. Order for repeat blood work placed to monitor iron. Please reschedule colonoscopy.

## 2023-12-26 NOTE — TELEPHONE ENCOUNTER
Called patient to make aware of labs and reschedule colonoscopy. Patient did not answer. Left VM and CB number

## 2023-12-28 ENCOUNTER — TELEPHONE (OUTPATIENT)
Dept: GASTROENTEROLOGY | Facility: CLINIC | Age: 43
End: 2023-12-28
Payer: COMMERCIAL

## 2023-12-28 NOTE — TELEPHONE ENCOUNTER
Hub staff attempted to follow warm transfer process and was unsuccessful     Caller: Chris Dahl    Relationship to patient: Self    Best call back number: [phone]     Patient is needing: PT NEEDS TO RESCHEDULE COLONOSCOPY ORIGINALLY SET FOR 8/31/23. PLEASE CALL WHEN NEXT AVAILABLE.

## 2024-01-08 PROBLEM — H20.9 UVEITIS: Status: ACTIVE | Noted: 2023-06-15

## 2024-01-12 NOTE — TELEPHONE ENCOUNTER
Called pt. No Answer. Left message for pt to call back.    Patient has been added to 2.22.24 @ 8:00 am for procedure and a [employer] message was sent to patient. It does not appear pt has reviewed message.     Postponing Patient Call until Monday, 1.15.24

## 2024-01-29 ENCOUNTER — TELEPHONE (OUTPATIENT)
Dept: GASTROENTEROLOGY | Facility: CLINIC | Age: 44
End: 2024-01-29
Payer: COMMERCIAL

## 2024-01-29 NOTE — TELEPHONE ENCOUNTER
Left message with patient asking for a returned call to follow up on overdue results for laboratory tests.

## 2024-02-20 ENCOUNTER — ANESTHESIA EVENT (OUTPATIENT)
Dept: GASTROENTEROLOGY | Facility: HOSPITAL | Age: 44
End: 2024-02-20
Payer: COMMERCIAL

## 2024-02-20 NOTE — ANESTHESIA PREPROCEDURE EVALUATION
Anesthesia Evaluation     Patient summary reviewed and Nursing notes reviewed   NPO Solid Status: > 8 hours  NPO Liquid Status: > 2 hours           Airway   Mallampati: I  TM distance: >3 FB  Neck ROM: full  No difficulty expected  Dental    (+) poor dentition        Pulmonary - normal exam    breath sounds clear to auscultation  (+) a smoker Current, Smoked day of surgery, cigarettes,  Cardiovascular - normal exam  Exercise tolerance: good (4-7 METS)    ECG reviewed  Rhythm: regular  Rate: normal        Neuro/Psych  (+) psychiatric history Depression  GI/Hepatic/Renal/Endo    (+) GERD well controlled, renal disease- stones    Musculoskeletal     Abdominal    Substance History      OB/GYN          Other        ROS/Med Hx Other: EKG 01/09/20: HR 64, SR    Hx of crohn's, s/p colon resection       Phys Exam Other: Full beard               Anesthesia Plan    ASA 2     general   total IV anesthesia  (Total IV Anesthesia    Patient understands anesthesia not responsible for dental damage.  )  intravenous induction     Anesthetic plan, risks, benefits, and alternatives have been provided, discussed and informed consent has been obtained with: patient and spouse/significant other.  Pre-procedure education provided  Plan discussed with CRNA.    CODE STATUS:

## 2024-02-22 ENCOUNTER — ANESTHESIA (OUTPATIENT)
Dept: GASTROENTEROLOGY | Facility: HOSPITAL | Age: 44
End: 2024-02-22
Payer: COMMERCIAL

## 2024-02-22 ENCOUNTER — HOSPITAL ENCOUNTER (OUTPATIENT)
Facility: HOSPITAL | Age: 44
Setting detail: HOSPITAL OUTPATIENT SURGERY
Discharge: HOME | End: 2024-02-22
Attending: INTERNAL MEDICINE | Admitting: INTERNAL MEDICINE
Payer: COMMERCIAL

## 2024-02-22 VITALS
RESPIRATION RATE: 18 BRPM | SYSTOLIC BLOOD PRESSURE: 126 MMHG | TEMPERATURE: 97 F | HEART RATE: 81 BPM | WEIGHT: 193.56 LBS | OXYGEN SATURATION: 94 % | HEIGHT: 72 IN | BODY MASS INDEX: 26.22 KG/M2 | DIASTOLIC BLOOD PRESSURE: 88 MMHG

## 2024-02-22 DIAGNOSIS — Z90.49 HISTORY OF COLON RESECTION: ICD-10-CM

## 2024-02-22 DIAGNOSIS — K50.919 CROHN'S DISEASE WITH COMPLICATION, UNSPECIFIED GASTROINTESTINAL TRACT LOCATION: ICD-10-CM

## 2024-02-22 DIAGNOSIS — H20.9 UVEITIS: ICD-10-CM

## 2024-02-22 PROCEDURE — 45380 COLONOSCOPY AND BIOPSY: CPT | Performed by: INTERNAL MEDICINE

## 2024-02-22 PROCEDURE — 25010000002 PROPOFOL 10 MG/ML EMULSION: Performed by: NURSE ANESTHETIST, CERTIFIED REGISTERED

## 2024-02-22 PROCEDURE — 25810000003 LACTATED RINGERS PER 1000 ML

## 2024-02-22 PROCEDURE — 88305 TISSUE EXAM BY PATHOLOGIST: CPT | Performed by: INTERNAL MEDICINE

## 2024-02-22 RX ORDER — VITAMIN B COMPLEX
CAPSULE ORAL DAILY
COMMUNITY

## 2024-02-22 RX ORDER — PROPOFOL 10 MG/ML
VIAL (ML) INTRAVENOUS AS NEEDED
Status: DISCONTINUED | OUTPATIENT
Start: 2024-02-22 | End: 2024-02-22 | Stop reason: SURG

## 2024-02-22 RX ORDER — SODIUM CHLORIDE, SODIUM LACTATE, POTASSIUM CHLORIDE, CALCIUM CHLORIDE 600; 310; 30; 20 MG/100ML; MG/100ML; MG/100ML; MG/100ML
30 INJECTION, SOLUTION INTRAVENOUS CONTINUOUS
Status: DISCONTINUED | OUTPATIENT
Start: 2024-02-22 | End: 2024-02-22 | Stop reason: HOSPADM

## 2024-02-22 RX ORDER — LIDOCAINE HYDROCHLORIDE 20 MG/ML
INJECTION, SOLUTION EPIDURAL; INFILTRATION; INTRACAUDAL; PERINEURAL AS NEEDED
Status: DISCONTINUED | OUTPATIENT
Start: 2024-02-22 | End: 2024-02-22 | Stop reason: SURG

## 2024-02-22 RX ADMIN — LIDOCAINE HYDROCHLORIDE 40 MG: 20 INJECTION, SOLUTION INTRAVENOUS at 09:41

## 2024-02-22 RX ADMIN — PROPOFOL 100 MG: 10 INJECTION, EMULSION INTRAVENOUS at 09:41

## 2024-02-22 RX ADMIN — SODIUM CHLORIDE, POTASSIUM CHLORIDE, SODIUM LACTATE AND CALCIUM CHLORIDE 30 ML/HR: 600; 310; 30; 20 INJECTION, SOLUTION INTRAVENOUS at 08:52

## 2024-02-22 RX ADMIN — PROPOFOL 250 MCG/KG/MIN: 10 INJECTION, EMULSION INTRAVENOUS at 09:41

## 2024-02-22 NOTE — H&P
"Pre Procedure History & Physical    Chief Complaint:   History of Crohn's disease    Subjective     HPI:   History of Crohn's disease    Past Medical History:   Past Medical History:   Diagnosis Date    Crohn's disease        Past Surgical History:  Past Surgical History:   Procedure Laterality Date    ABDOMINAL SURGERY      partial colonectomy    APPENDECTOMY      COLONOSCOPY         Family History:  Family History   Problem Relation Age of Onset    Crohn's disease Paternal Aunt     Colon cancer Neg Hx        Social History:   reports that he has been smoking cigarettes. He has a 37.50 pack-year smoking history. He has been exposed to tobacco smoke. He has never used smokeless tobacco. He reports current alcohol use. He reports that he does not use drugs.    Medications:   Medications Prior to Admission   Medication Sig Dispense Refill Last Dose    B Complex Vitamins (vitamin b complex) capsule capsule Take  by mouth Daily.   Past Week    ferrous sulfate 325 (65 FE) MG tablet TAKE 1 TABLET BY MOUTH EVERY DAY WITH BREAKFAST 90 tablet 1 Past Week       Allergies:  Patient has no known allergies.        Objective     Blood pressure 147/95, pulse 79, temperature 97.1 °F (36.2 °C), temperature source Temporal, resp. rate 18, height 182.9 cm (72\"), weight 87.8 kg (193 lb 9 oz), SpO2 94%.    Physical Exam   Constitutional: Pt is oriented to person, place, and time and well-developed, well-nourished, and in no distress.   Mouth/Throat: Oropharynx is clear and moist.   Neck: Normal range of motion.   Cardiovascular: Normal rate, regular rhythm and normal heart sounds.    Pulmonary/Chest: Effort normal and breath sounds normal.   Abdominal: Soft. Nontender  Skin: Skin is warm and dry.   Psychiatric: Mood, memory, affect and judgment normal.     Assessment & Plan     Diagnosis:  Crohn's    Anticipated Surgical Procedure:  Colonoscopy    The risks, benefits, and alternatives of this procedure have been discussed with the " patient or the responsible party- the patient understands and agrees to proceed.

## 2024-02-22 NOTE — ANESTHESIA POSTPROCEDURE EVALUATION
Patient: Chris Dahl    Procedure Summary       Date: 02/22/24 Room / Location: [facility] ENDOSCOPY 4 / [facility] ENDOSCOPY    Anesthesia Start: 0940 Anesthesia Stop: 1008    Procedure: COLONOSCOPY WITH BIOPSIES Diagnosis:       Crohn's disease with complication, unspecified gastrointestinal tract location      Uveitis      History of colon resection      (Crohn's disease with complication, unspecified gastrointestinal tract location [K50.919])      (Uveitis [H20.9])      (History of colon resection [Z90.49])    Surgeons: Adis Blackman MD Provider: Ginger Grigsby CRNA    Anesthesia Type: general ASA Status: 2            Anesthesia Type: general    Vitals  Vitals Value Taken Time   /83 02/22/24 1016   Temp 36.1 °C (97 °F) 02/22/24 1005   Pulse 84 02/22/24 1019   Resp 20 02/22/24 1010   SpO2 94 % 02/22/24 1019   Vitals shown include unfiled device data.        Post Anesthesia Care and Evaluation    Post-procedure mental status: acceptable.  Pain management: satisfactory to patient    Airway patency: patent  Anesthetic complications: No anesthetic complications    Cardiovascular status: acceptable  Respiratory status: acceptable    Comments: Per chart review

## 2024-02-23 LAB
CYTO UR: NORMAL
LAB AP CASE REPORT: NORMAL
LAB AP CLINICAL INFORMATION: NORMAL
PATH REPORT.FINAL DX SPEC: NORMAL
PATH REPORT.GROSS SPEC: NORMAL

## 2024-03-04 ENCOUNTER — TELEPHONE (OUTPATIENT)
Dept: GASTROENTEROLOGY | Facility: CLINIC | Age: 44
End: 2024-03-04
Payer: COMMERCIAL

## 2024-03-04 NOTE — TELEPHONE ENCOUNTER
----- Message from VENECIA Floyd sent at 2/23/2024 12:39 PM EST -----  I have reviewed the patient's most recent colonoscopy and pathology report.  A few ulcers present in terminal ileum and biopsies consistent with active inflammation and ulceration.  Biopsies negative for granulomas and dysplasia.  Please maintain follow-up appointment 4/10/2024

## 2024-03-04 NOTE — TELEPHONE ENCOUNTER
Attempted to contact patient, left voicemail message to return call. Provided direct contact information.    Follow up appointment with VENECIA Cr on 04.10.24 at 0900.

## 2024-03-06 NOTE — TELEPHONE ENCOUNTER
Spoke to patient and informed of VENECIA Rodríguez result note and recommendations. Verified patient understanding.    Reminded patient of overdue labs.  Patient states he will have those drawn prior to office visit.    Confirmed follow up appointment with VENECIA Cr on 04.10.24 at 0900.

## 2024-04-10 ENCOUNTER — OFFICE VISIT (OUTPATIENT)
Dept: GASTROENTEROLOGY | Facility: CLINIC | Age: 44
End: 2024-04-10
Payer: COMMERCIAL

## 2024-04-10 VITALS
WEIGHT: 198.2 LBS | HEIGHT: 72 IN | BODY MASS INDEX: 26.84 KG/M2 | HEART RATE: 86 BPM | DIASTOLIC BLOOD PRESSURE: 94 MMHG | SYSTOLIC BLOOD PRESSURE: 151 MMHG

## 2024-04-10 DIAGNOSIS — K50.10 CROHN'S DISEASE OF LARGE INTESTINE WITHOUT COMPLICATION: Primary | ICD-10-CM

## 2024-04-10 NOTE — PROGRESS NOTES
Patient Name: Chris Dahl   Visit Date: 04/10/2024   Patient ID: [id number]  Provider: VENECIA Aceves    Sex: male  Location:  Location Address:  Location Phone: [phone] [address] [place] [address] [phone]    YOB: 1980  Age: 44 y.o.   Primary Care Provider Catherine Roach APRN      Referring Provider: No ref. provider found        Chief Complaint  Abdominal Pain (Lower ABD pain occ ) and Crohn's Disease    History of Present Illness    Initially established care in 2013. History of Crohn's diagnosis 2004. He had a colon resection about 1 year after diagnosis in [place]. He previously managed Crohn's with Pentasa. Biologic naive.  Colonoscopy 3/13/2013 by Dr. Blackman - evidence of right hemicolectomy, small hyperplastic polyp in rectum otherwise normal mucosa. Biopsies normal of terminal ileum, right colon, left colon, and rectum.   EGD/Colonoscopy 9/10/2015 by Dr. Blackman - LA grade D esophagitis with large ulcers in distal esophagus and hiatal hernia. Esophageal biopsies - active esophagitis with ulceration. Normal duodenum biopsies. Normal mucosa throughout colon with internal hemorrhoids and post surgical changes in right colon. Normal biopsies of right colon, left colon, and rectum.   EGD 1/07/2016 by Dr. Blackman - normal esophagus, hiatal hernia, normal stomach and duodenum. Esophageal biopsies - chronic inflammation.    Patient was last seen in the office 6/15/2023 by nurse practitioner Angelia Jurado, he reported being seen by optometrist and diagnosed with uveitis and was on steroid drops, he had no GI complaints    Colonoscopy 2/22/2024: Good prep, internal hemorrhoids, grade 1, 6 mm healed ulcer noted in the sigmoid colon with biopsy taken-biopsy negative, patent ileocolonic anastomosis noted in the proximal transverse colon, neoterminal ileum contained a few 5 mm ulcers-biopsy with active inflammation, no granulomas no dysplasia, no bleeding was present    Pt  "states he does not have abd pain, no diarrhea, usually 1-2 stools/day, Tyler # 1-2, no blood in stool.   Pt denies regular use of NSAIDs.   Pt states left eye feels blurry, sx since March 2023- pt states it does come and go. No treatment since June 2023, last exam July or August 2023.   Past Medical History:   Diagnosis Date    Crohn's disease        Past Surgical History:   Procedure Laterality Date    ABDOMINAL SURGERY      partial colonectomy    APPENDECTOMY      COLONOSCOPY      COLONOSCOPY N/A 2/22/2024    Procedure: COLONOSCOPY WITH BIOPSIES;  Surgeon: Adis Blackman MD;  Location: [facility] ENDOSCOPY;  Service: Gastroenterology;  Laterality: N/A;  TERMINAL ILEUM ULCER, PREVIOUS SURGERY, HEMORRHOIDS       No Known Allergies    Family History   Problem Relation Age of Onset    Crohn's disease Paternal Aunt     Colon cancer Neg Hx         Social History     Tobacco Use    Smoking status: Every Day     Current packs/day: 1.50     Average packs/day: 1.5 packs/day for 25.0 years (37.5 ttl pk-yrs)     Types: Cigarettes     Passive exposure: Past    Smokeless tobacco: Never   Vaping Use    Vaping status: Never Used   Substance Use Topics    Alcohol use: Yes     Comment: occ    Drug use: Never       Objective     Vital Signs:   /94 (BP Location: Right arm, Patient Position: Sitting, Cuff Size: Adult)   Pulse 86   Ht 182.9 cm (72\")   Wt 89.9 kg (198 lb 3.2 oz)   BMI 26.88 kg/m²       Physical Exam  Constitutional:       General: The patient is not in acute distress.     Appearance: Normal appearance.   HENT:      Head: Normocephalic and atraumatic.      Nose: Nose normal.   Pulmonary:      Effort: Pulmonary effort is normal. No respiratory distress.   Abdominal:      General: Abdomen is flat.      Palpations: Abdomen is soft. There is no mass.      Tenderness: There is no abdominal tenderness. There is no guarding.   Musculoskeletal:      Cervical back: Neck supple.      Right lower leg: No edema.      " "Left lower leg: No edema.   Skin:     General: Skin is warm and dry.   Neurological:      General: No focal deficit present.      Mental Status: The patient is alert and oriented to person, place, and time.      Gait: Gait normal.   Psychiatric:         Mood and Affect: Mood normal.         Speech: Speech normal.         Behavior: Behavior normal.         Thought Content: Thought content normal.     Result Review :   The following data was reviewed by: VENECIA Aceves on 04/10/2024:    CBC w/diff          6/15/2023    15:47   CBC w/Diff   WBC 9.85    RBC 5.63    Hemoglobin 16.3    Hematocrit 48.2    MCV 85.6    MCH 29.0    MCHC 33.8    RDW 13.3    Platelets 363    Neutrophil Rel % 55.8    Immature Granulocyte Rel % 0.4    Lymphocyte Rel % 31.8    Monocyte Rel % 7.9    Eosinophil Rel % 3.4    Basophil Rel % 0.7      CMP          6/15/2023    15:47   CMP   Glucose 89    BUN 6    Creatinine 0.84    EGFR 112.4    Sodium 139    Potassium 4.2    Chloride 104    Calcium 9.6    Total Protein 6.9    Albumin 3.9    Globulin 3.0    Total Bilirubin <0.2    Alkaline Phosphatase 150    AST (SGOT) 17    ALT (SGPT) 24    Albumin/Globulin Ratio 1.3    BUN/Creatinine Ratio 7.1    Anion Gap 9.7        Iron Profile   Iron   Date Value Ref Range Status   06/15/2023 50 (L) 59 - 158 mcg/dL Final     TIBC   Date Value Ref Range Status   06/15/2023 338 298 - 536 mcg/dL Final     Iron Saturation (TSAT)   Date Value Ref Range Status   06/15/2023 15 (L) 20 - 50 % Final     Transferrin   Date Value Ref Range Status   06/15/2023 227 200 - 360 mg/dL Final     Ferritin No results found for: \"FERRITIN\"  ESR (Sed Rate)   Sed Rate   Date Value Ref Range Status   06/15/2023 18 (H) 0 - 15 mm/hr Final     CRP (C-Reactive)   C-Reactive Protein   Date Value Ref Range Status   06/15/2023 0.52 (H) 0.00 - 0.50 mg/dL Final     GGT normal 6/15/23          Assessment and Plan    Diagnoses and all orders for this visit:    1. Crohn's disease of " large intestine without complication (Primary)  -     Hepatitis B Core Antibody, Total; Future  -     QuantiFERON TB Gold; Future  -     Histoplasma Ag Ur - , Urine, Clean Catch; Future  -     Hepatitis B Surface Antigen; Future  -     Hepatitis B Surface Antibody; Future  -     CBC Auto Differential; Future  -     Comprehensive Metabolic Panel; Future  -     Iron Profile; Future  -     C-reactive Protein; Future  -     Sedimentation Rate; Future  -     Ambulatory Referral to Ophthalmology              Follow Up   Return in about 4 months (around 8/10/2024).  Ophthalmology consult - reports visual changes for the last year, and hx of uveitis. I did d/w pt that this is one of the EIM's that can occur independent of crohn's disease activity.   I discussed with patient possible need for Biologics as he does have some activity on his colonoscopy although it is rather mild and patient is not having any symptoms.  I also reviewed with Dr. Blackman, he did feel that patient was going to need Biologics but okay to wait for now.  I will have patient update his labs and we will check inflammatory markers as well as the labs needed prior to Biologics.  I will have patient follow-up with Dr. Blackman in a few months.  Avoid NSAIDs    Patient was given instructions and counseling regarding his condition or for health maintenance advice. Please see specific information pulled into the AVS if appropriate.

## 2024-05-24 ENCOUNTER — TELEPHONE (OUTPATIENT)
Dept: GASTROENTEROLOGY | Facility: CLINIC | Age: 44
End: 2024-05-24
Payer: COMMERCIAL

## 2024-05-24 NOTE — TELEPHONE ENCOUNTER
Attempted to contact pt, left vm reminding pt of overdue labs and info on where he could have them drawn.

## 2024-06-10 ENCOUNTER — TELEPHONE (OUTPATIENT)
Dept: GASTROENTEROLOGY | Facility: CLINIC | Age: 44
End: 2024-06-10
Payer: COMMERCIAL

## 2024-06-10 NOTE — TELEPHONE ENCOUNTER
Caller: SORAYA WITH [facility]    Relationship:     Best call back number: [phone]    What form or medical record are you requesting: LAST EXAM NOTES AND MEDICATION LIST    Who is requesting this form or medical record from you:     How would you like to receive the form or medical records (pick-up, mail, fax): FAX  If fax, what is the fax number: [phone]    Timeframe paperwork needed: NEEDS FAXED ASAP APPT 6-12-24

## 2024-06-12 ENCOUNTER — LAB (OUTPATIENT)
Dept: LAB | Facility: HOSPITAL | Age: 44
End: 2024-06-12
Payer: COMMERCIAL

## 2024-06-12 DIAGNOSIS — R74.8 ELEVATED ALKALINE PHOSPHATASE LEVEL: ICD-10-CM

## 2024-06-12 DIAGNOSIS — K50.10 CROHN'S DISEASE OF LARGE INTESTINE WITHOUT COMPLICATION: ICD-10-CM

## 2024-06-12 LAB
ALBUMIN SERPL-MCNC: 3.7 G/DL (ref 3.5–5.2)
ALBUMIN/GLOB SERPL: 1.2 G/DL
ALP SERPL-CCNC: 141 U/L (ref 39–117)
ALT SERPL W P-5'-P-CCNC: 16 U/L (ref 1–41)
ANION GAP SERPL CALCULATED.3IONS-SCNC: 10.1 MMOL/L (ref 5–15)
AST SERPL-CCNC: 10 U/L (ref 1–40)
BASOPHILS # BLD AUTO: 0.05 10*3/MM3 (ref 0–0.2)
BASOPHILS NFR BLD AUTO: 0.4 % (ref 0–1.5)
BILIRUB SERPL-MCNC: 0.2 MG/DL (ref 0–1.2)
BUN SERPL-MCNC: 6 MG/DL (ref 6–20)
BUN/CREAT SERPL: 7.9 (ref 7–25)
CALCIUM SPEC-SCNC: 9 MG/DL (ref 8.6–10.5)
CHLORIDE SERPL-SCNC: 105 MMOL/L (ref 98–107)
CO2 SERPL-SCNC: 24.9 MMOL/L (ref 22–29)
CREAT SERPL-MCNC: 0.76 MG/DL (ref 0.76–1.27)
CRP SERPL-MCNC: 0.93 MG/DL (ref 0–0.5)
DEPRECATED RDW RBC AUTO: 39.8 FL (ref 37–54)
EGFRCR SERPLBLD CKD-EPI 2021: 115.1 ML/MIN/1.73
EOSINOPHIL # BLD AUTO: 0.27 10*3/MM3 (ref 0–0.4)
EOSINOPHIL NFR BLD AUTO: 2.4 % (ref 0.3–6.2)
ERYTHROCYTE [DISTWIDTH] IN BLOOD BY AUTOMATED COUNT: 13.2 % (ref 12.3–15.4)
ERYTHROCYTE [SEDIMENTATION RATE] IN BLOOD: 14 MM/HR (ref 0–15)
GLOBULIN UR ELPH-MCNC: 3.1 GM/DL
GLUCOSE SERPL-MCNC: 86 MG/DL (ref 65–99)
HBV SURFACE AB SER RIA-ACNC: NORMAL
HBV SURFACE AG SERPL QL IA: NORMAL
HCT VFR BLD AUTO: 47.9 % (ref 37.5–51)
HGB BLD-MCNC: 16.1 G/DL (ref 13–17.7)
IMM GRANULOCYTES # BLD AUTO: 0.04 10*3/MM3 (ref 0–0.05)
IMM GRANULOCYTES NFR BLD AUTO: 0.4 % (ref 0–0.5)
IRON 24H UR-MRATE: 45 MCG/DL (ref 59–158)
IRON SATN MFR SERPL: 14 % (ref 20–50)
LYMPHOCYTES # BLD AUTO: 3 10*3/MM3 (ref 0.7–3.1)
LYMPHOCYTES NFR BLD AUTO: 26.4 % (ref 19.6–45.3)
MCH RBC QN AUTO: 28.6 PG (ref 26.6–33)
MCHC RBC AUTO-ENTMCNC: 33.6 G/DL (ref 31.5–35.7)
MCV RBC AUTO: 85.1 FL (ref 79–97)
MONOCYTES # BLD AUTO: 0.85 10*3/MM3 (ref 0.1–0.9)
MONOCYTES NFR BLD AUTO: 7.5 % (ref 5–12)
NEUTROPHILS NFR BLD AUTO: 62.9 % (ref 42.7–76)
NEUTROPHILS NFR BLD AUTO: 7.16 10*3/MM3 (ref 1.7–7)
NRBC BLD AUTO-RTO: 0 /100 WBC (ref 0–0.2)
PLATELET # BLD AUTO: 369 10*3/MM3 (ref 140–450)
PMV BLD AUTO: 9.9 FL (ref 6–12)
POTASSIUM SERPL-SCNC: 3.9 MMOL/L (ref 3.5–5.2)
PROT SERPL-MCNC: 6.8 G/DL (ref 6–8.5)
RBC # BLD AUTO: 5.63 10*6/MM3 (ref 4.14–5.8)
SODIUM SERPL-SCNC: 140 MMOL/L (ref 136–145)
TIBC SERPL-MCNC: 322 MCG/DL (ref 298–536)
TRANSFERRIN SERPL-MCNC: 216 MG/DL (ref 200–360)
WBC NRBC COR # BLD AUTO: 11.37 10*3/MM3 (ref 3.4–10.8)

## 2024-06-12 PROCEDURE — 85652 RBC SED RATE AUTOMATED: CPT

## 2024-06-12 PROCEDURE — 86480 TB TEST CELL IMMUN MEASURE: CPT

## 2024-06-12 PROCEDURE — 86704 HEP B CORE ANTIBODY TOTAL: CPT

## 2024-06-12 PROCEDURE — 87340 HEPATITIS B SURFACE AG IA: CPT

## 2024-06-12 PROCEDURE — 87385 HISTOPLASMA CAPSUL AG IA: CPT

## 2024-06-12 PROCEDURE — 86706 HEP B SURFACE ANTIBODY: CPT

## 2024-06-12 PROCEDURE — 82977 ASSAY OF GGT: CPT

## 2024-06-12 PROCEDURE — 80053 COMPREHEN METABOLIC PANEL: CPT

## 2024-06-12 PROCEDURE — 86140 C-REACTIVE PROTEIN: CPT

## 2024-06-12 PROCEDURE — 83540 ASSAY OF IRON: CPT

## 2024-06-12 PROCEDURE — 84466 ASSAY OF TRANSFERRIN: CPT

## 2024-06-12 PROCEDURE — 85025 COMPLETE CBC W/AUTO DIFF WBC: CPT

## 2024-06-12 PROCEDURE — 36415 COLL VENOUS BLD VENIPUNCTURE: CPT

## 2024-06-13 LAB — HBV CORE AB SERPL QL IA: NEGATIVE

## 2024-06-14 DIAGNOSIS — K50.10 CROHN'S DISEASE OF LARGE INTESTINE WITHOUT COMPLICATION: Primary | ICD-10-CM

## 2024-06-14 DIAGNOSIS — R74.8 ELEVATED ALKALINE PHOSPHATASE LEVEL: ICD-10-CM

## 2024-06-14 LAB
GAMMA INTERFERON BACKGROUND BLD IA-ACNC: 0.05 IU/ML
GGT SERPL-CCNC: 11 U/L (ref 8–61)
M TB IFN-G BLD-IMP: NEGATIVE
M TB IFN-G CD4+ BCKGRND COR BLD-ACNC: 0.04 IU/ML
M TB IFN-G CD4+CD8+ BCKGRND COR BLD-ACNC: 0.04 IU/ML
MITOGEN IGNF BCKGRD COR BLD-ACNC: >10 IU/ML
QUANTIFERON INCUBATION: NORMAL
SERVICE CMNT-IMP: NORMAL

## 2024-06-17 ENCOUNTER — TELEPHONE (OUTPATIENT)
Dept: GASTROENTEROLOGY | Facility: CLINIC | Age: 44
End: 2024-06-17
Payer: COMMERCIAL

## 2024-06-17 LAB — H CAPSUL AG UR QL IA: NEGATIVE

## 2024-06-17 NOTE — TELEPHONE ENCOUNTER
Called pt notified of results and recommendations, pt verbalized understanding. Pt stated not having any symptoms of infection.

## 2024-06-17 NOTE — TELEPHONE ENCOUNTER
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

## 2024-06-18 ENCOUNTER — LAB (OUTPATIENT)
Dept: LAB | Facility: HOSPITAL | Age: 44
End: 2024-06-18
Payer: COMMERCIAL

## 2024-06-18 DIAGNOSIS — K50.10 CROHN'S DISEASE OF LARGE INTESTINE WITHOUT COMPLICATION: ICD-10-CM

## 2024-06-18 DIAGNOSIS — R74.8 ELEVATED ALKALINE PHOSPHATASE LEVEL: ICD-10-CM

## 2024-06-18 LAB
INR PPP: 0.97 (ref 0.86–1.15)
PROTHROMBIN TIME: 13.1 SECONDS (ref 11.8–14.9)

## 2024-06-18 PROCEDURE — 36415 COLL VENOUS BLD VENIPUNCTURE: CPT

## 2024-06-18 PROCEDURE — 85610 PROTHROMBIN TIME: CPT

## 2024-06-18 PROCEDURE — 86381 MITOCHONDRIAL ANTIBODY EACH: CPT

## 2024-06-19 LAB — MITOCHONDRIA M2 IGG SER-ACNC: <20 UNITS (ref 0–20)

## 2024-07-12 ENCOUNTER — HOSPITAL ENCOUNTER (OUTPATIENT)
Dept: ULTRASOUND IMAGING | Facility: HOSPITAL | Age: 44
Discharge: HOME | End: 2024-07-12
Admitting: NURSE PRACTITIONER
Payer: COMMERCIAL

## 2024-07-12 DIAGNOSIS — R74.8 ELEVATED ALKALINE PHOSPHATASE LEVEL: ICD-10-CM

## 2024-07-12 DIAGNOSIS — K50.10 CROHN'S DISEASE OF LARGE INTESTINE WITHOUT COMPLICATION: ICD-10-CM

## 2024-07-12 PROCEDURE — 76705 ECHO EXAM OF ABDOMEN: CPT

## 2024-07-23 ENCOUNTER — LAB (OUTPATIENT)
Dept: LAB | Facility: HOSPITAL | Age: 44
End: 2024-07-23
Payer: COMMERCIAL

## 2024-07-23 ENCOUNTER — TRANSCRIBE ORDERS (OUTPATIENT)
Dept: ADMINISTRATIVE | Facility: HOSPITAL | Age: 44
End: 2024-07-23
Payer: COMMERCIAL

## 2024-07-23 DIAGNOSIS — H46.02 OPTIC PAPILLITIS OF LEFT EYE: ICD-10-CM

## 2024-07-23 DIAGNOSIS — H44.112 PANUVEITIS OF LEFT EYE: Primary | ICD-10-CM

## 2024-07-23 DIAGNOSIS — H44.112 PANUVEITIS OF LEFT EYE: ICD-10-CM

## 2024-07-23 LAB
CHROMATIN AB SERPL-ACNC: <10 IU/ML (ref 0–14)
TREPONEMA PALLIDUM IGG+IGM AB [PRESENCE] IN SERUM OR PLASMA BY IMMUNOASSAY: NORMAL

## 2024-07-23 PROCEDURE — 86038 ANTINUCLEAR ANTIBODIES: CPT

## 2024-07-23 PROCEDURE — 82164 ANGIOTENSIN I ENZYME TEST: CPT

## 2024-07-23 PROCEDURE — 36415 COLL VENOUS BLD VENIPUNCTURE: CPT

## 2024-07-23 PROCEDURE — 85549 MURAMIDASE: CPT

## 2024-07-23 PROCEDURE — 86037 ANCA TITER EACH ANTIBODY: CPT

## 2024-07-23 PROCEDURE — 81373 HLA I TYPING 1 LOCUS LR: CPT

## 2024-07-23 PROCEDURE — 86618 LYME DISEASE ANTIBODY: CPT

## 2024-07-23 PROCEDURE — 86780 TREPONEMA PALLIDUM: CPT

## 2024-07-23 PROCEDURE — 86431 RHEUMATOID FACTOR QUANT: CPT

## 2024-07-24 LAB
ACE SERPL-CCNC: 55 U/L (ref 14–82)
B BURGDOR IGG+IGM SER QL IA: NEGATIVE
C-ANCA TITR SER IF: NORMAL TITER
P-ANCA ATYPICAL TITR SER IF: NORMAL TITER
P-ANCA TITR SER IF: NORMAL TITER

## 2024-07-25 LAB — ANA SER QL IF: NEGATIVE

## 2024-07-26 LAB — LYSOZYME SERPL-MCNC: 8.4 UG/ML (ref 3.4–7.6)

## 2024-07-29 LAB
HLA-B: NORMAL
HLA-B: NORMAL
REF LAB TEST METHOD: NORMAL

## 2024-08-15 ENCOUNTER — OFFICE VISIT (OUTPATIENT)
Dept: GASTROENTEROLOGY | Facility: CLINIC | Age: 44
End: 2024-08-15
Payer: COMMERCIAL

## 2024-08-15 VITALS
WEIGHT: 201.2 LBS | HEART RATE: 77 BPM | HEIGHT: 72 IN | SYSTOLIC BLOOD PRESSURE: 165 MMHG | BODY MASS INDEX: 27.25 KG/M2 | DIASTOLIC BLOOD PRESSURE: 77 MMHG

## 2024-08-15 DIAGNOSIS — K50.10 CROHN'S DISEASE OF LARGE INTESTINE WITHOUT COMPLICATION: Primary | ICD-10-CM

## 2024-08-15 DIAGNOSIS — K82.4 GALLBLADDER POLYP: ICD-10-CM

## 2024-08-15 DIAGNOSIS — R74.8 ELEVATED ALKALINE PHOSPHATASE LEVEL: ICD-10-CM

## 2024-08-15 DIAGNOSIS — R12 HEARTBURN: ICD-10-CM

## 2024-08-15 RX ORDER — PANTOPRAZOLE SODIUM 40 MG/1
40 TABLET, DELAYED RELEASE ORAL DAILY
Qty: 30 TABLET | Refills: 1 | Status: SHIPPED | OUTPATIENT
Start: 2024-08-15

## 2024-08-15 RX ORDER — PREDNISONE 20 MG/1
TABLET ORAL
COMMUNITY
Start: 2024-08-02

## 2024-08-15 RX ORDER — PREDNISOLONE ACETATE 10 MG/ML
SUSPENSION/ DROPS OPHTHALMIC
COMMUNITY
Start: 2024-07-11

## 2024-08-15 NOTE — PROGRESS NOTES
Patient Name: Chris Dahl   Visit Date: 08/15/2024   Patient ID: [id number]  Provider: VENECIA Aceves    Sex: male  Location:  Location Address:  Location Phone: [phone]  [place] [address] [phone]    YOB: 1980  Age: 44 y.o.   Primary Care Provider Catherine Roach APRN      Referring Provider: No ref. provider found        Chief Complaint  Crohn's Disease (Follow up ), Abdominal Pain (Recently had some lower ABD pain that lasted a few days, subsided ), and Hemorrhoids    History of Present Illness    Initially established care in 2013. History of Crohn's diagnosis 2004. He had a colon resection about 1 year after diagnosis in [place]. He previously managed Crohn's with Pent[employer]. Biologic naive.  Colonoscopy 3/13/2013 by Dr. Blackman - evidence of right hemicolectomy, small hyperplastic polyp in rectum otherwise normal mucosa. Biopsies normal of terminal ileum, right colon, left colon, and rectum.   EGD/Colonoscopy 9/10/2015 by Dr. Blackman - LA grade D esophagitis with large ulcers in distal esophagus and hiatal hernia. Esophageal biopsies - active esophagitis with ulceration. Normal duodenum biopsies. Normal mucosa throughout colon with internal hemorrhoids and post surgical changes in right colon. Normal biopsies of right colon, left colon, and rectum.   EGD 1/07/2016 by Dr. Blackman - normal esophagus, hiatal hernia, normal stomach and duodenum. Esophageal biopsies - chronic inflammation.     6/15/23: Reported diagnosed with uveitis, following with ophthalmology    Colonoscopy 2/22/2024: Good prep, internal hemorrhoids, grade 1, 6 mm healed ulcer noted in the sigmoid colon with biopsy taken-biopsy negative, patent ileocolonic anastomosis noted in the proximal transverse colon, neoterminal ileum contained a few 5 mm ulcers-biopsy with active inflammation, no granulomas no dysplasia, no bleeding was present     Patient was last seen 4/10/24, was doing well with minimal GI  complaints, consulted ophthalmology for complaint of blurry vision    6/12/24: normal sed rate, CRP Minimally elevated at 0.93, iron profile with low iron and iron saturation, CMP unremarkable except alk phos slightly elevated at 141,  GGT normal, CBC with normal platelets and hemoglobin, white count 11.3, hepatitis B surface antibody and antigen negative, hepatitis B total core antibody negative, TB negative, urine histoplasmosis negative  6/18/2024: INR 0.97, negative AMA    Liver ultrasound 7/12/2024: No acute process, adherent stone versus small polyp in the gallbladder, liver appears normal-plan to repeat in 6 months    Pt states he had some flare up with hemorrhoids d/t constipation, states he's improved now and no constipation currently. Denies blood in stool. Most of the time, [place] # 5, 1-2 stools/d, none during the night.   Usually no abd pain.   Pt is on drops and prednisone oral by ophthalmology for uveitis.   Pt states he's had some intermittent HB in the last week .   Past Medical History:   Diagnosis Date    Crohn's disease        Past Surgical History:   Procedure Laterality Date    ABDOMINAL SURGERY      partial colonectomy    APPENDECTOMY      COLONOSCOPY      COLONOSCOPY N/A 2/22/2024    Procedure: COLONOSCOPY WITH BIOPSIES;  Surgeon: Adis Blackman MD;  Location: [facility];  Service: Gastroenterology;  Laterality: N/A;  TERMINAL ILEUM ULCER, PREVIOUS SURGERY, HEMORRHOIDS       No Known Allergies    Family History   Problem Relation Age of Onset    Crohn's disease Paternal Aunt     Colon cancer Neg Hx         Social History     Tobacco Use    Smoking status: Every Day     Current packs/day: 1.50     Average packs/day: 1.5 packs/day for 25.0 years (37.5 ttl pk-yrs)     Types: Cigarettes     Passive exposure: Past    Smokeless tobacco: Never   Vaping Use    Vaping status: Never Used   Substance Use Topics    Alcohol use: Yes     Comment: occ    Drug use: Never       Objective  "    Vital Signs:   /77 (BP Location: Right arm, Patient Position: Sitting, Cuff Size: Adult)   Pulse 77   Ht 182.9 cm (72\")   Wt 91.3 kg (201 lb 3.2 oz)   BMI 27.29 kg/m²       Physical Exam  Constitutional:       General: The patient is not in acute distress.     Appearance: Normal appearance.   HENT:      Head: Normocephalic and atraumatic.      Nose: Nose normal.   Pulmonary:      Effort: Pulmonary effort is normal. No respiratory distress.   Abdominal:      General: Abdomen is flat.      Palpations: Abdomen is soft. There is no mass.      Tenderness: There is no abdominal tenderness. There is no guarding.   Musculoskeletal:      Cervical back: Neck supple.      Right lower leg: No edema.      Left lower leg: No edema.   Skin:     General: Skin is warm and dry.   Neurological:      General: No focal deficit present.      Mental Status: The patient is alert and oriented to person, place, and time.      Gait: Gait normal.   Psychiatric:         Mood and Affect: Mood normal.         Speech: Speech normal.         Behavior: Behavior normal.         Thought Content: Thought content normal.     Result Review :   The following data was reviewed by: VENECIA Aceves on 08/15/2024:    CBC w/diff          6/12/2024    14:43   CBC w/Diff   WBC 11.37    RBC 5.63    Hemoglobin 16.1    Hematocrit 47.9    MCV 85.1    MCH 28.6    MCHC 33.6    RDW 13.2    Platelets 369    Neutrophil Rel % 62.9    Immature Granulocyte Rel % 0.4    Lymphocyte Rel % 26.4    Monocyte Rel % 7.5    Eosinophil Rel % 2.4    Basophil Rel % 0.4      CMP          6/12/2024    14:43   CMP   Glucose 86    BUN 6    Creatinine 0.76    EGFR 115.1    Sodium 140    Potassium 3.9    Chloride 105    Calcium 9.0    Total Protein 6.8    Albumin 3.7    Globulin 3.1    Total Bilirubin 0.2    Alkaline Phosphatase 141    AST (SGOT) 10    ALT (SGPT) 16    Albumin/Globulin Ratio 1.2    BUN/Creatinine Ratio 7.9    Anion Gap 10.1        Liver Workup "   Iron   Date Value Ref Range Status   06/12/2024 45 (L) 59 - 158 mcg/dL Final     TIBC   Date Value Ref Range Status   06/12/2024 322 298 - 536 mcg/dL Final     Iron Saturation (TSAT)   Date Value Ref Range Status   06/12/2024 14 (L) 20 - 50 % Final     Transferrin   Date Value Ref Range Status   06/12/2024 216 200 - 360 mg/dL Final     Mitochondrial Ab   Date Value Ref Range Status   06/18/2024 <20.0 0.0 - 20.0 Units Final     Comment:                                     Negative    0.0 - 20.0                                  Equivocal  20.1 - 24.9                                  Positive         >24.9  Mitochondrial (M2) Antibodies are found in 90-96% of  patients with primary biliary cirrhosis.     Protime   Date Value Ref Range Status   06/18/2024 13.1 11.8 - 14.9 Seconds Final     INR   Date Value Ref Range Status   06/18/2024 0.97 0.86 - 1.15 Final     Acute HEP Panel   Hepatitis B Surface Ag   Date Value Ref Range Status   06/12/2024 Non-Reactive Non-Reactive Final     Hepatitis C Ab   Date Value Ref Range Status   03/02/2023 Non-Reactive Non-Reactive Final               Assessment and Plan    Diagnoses and all orders for this visit:    1. Crohn's disease of large intestine without complication (Primary)  -     CBC Auto Differential; Future  -     Comprehensive Metabolic Panel; Future  -     Iron Profile; Future  -     C-reactive Protein; Future  -     Sedimentation Rate; Future  -     Case Request; Standing  -     Case Request    2. Gallbladder polyp  -     US Liver; Future    3. Elevated alkaline phosphatase level    4. Heartburn    Other orders  -     polyethylene glycol (GoLYTELY) 236 g solution; Take per office instructions  Dispense: 4000 mL; Refill: 0  -     pantoprazole (PROTONIX) 40 MG EC tablet; Take 1 tablet by mouth Daily.  Dispense: 30 tablet; Refill: 1  -     Follow Anesthesia Guidelines / Protocol; Standing  -     Follow Anesthesia Guidelines / Protocol; Future  -     Obtain Informed Consent;  Future  -     Verify NPO; Standing  -     Obtain Informed Consent; Standing              Follow Up   Return in about 3 months (around 11/15/2024).  Liver ultrasound in January  Labs in Dec   Colon in Feb  Protonix 40 mg/d x 2 months then D/C. If sx off of PPI then EGD - scheduling f/u to eval  Pt is under care of ophthalmology for uveitis    Patient was given instructions and counseling regarding his condition or for health maintenance advice. Please see specific information pulled into the AVS if appropriate.

## 2024-09-11 RX ORDER — PANTOPRAZOLE SODIUM 40 MG/1
40 TABLET, DELAYED RELEASE ORAL DAILY
Qty: 30 TABLET | Refills: 1 | Status: SHIPPED | OUTPATIENT
Start: 2024-09-11

## 2024-10-03 ENCOUNTER — OFFICE VISIT (OUTPATIENT)
Dept: FAMILY MEDICINE CLINIC | Facility: CLINIC | Age: 44
End: 2024-10-03
Payer: COMMERCIAL

## 2024-10-03 VITALS
WEIGHT: 201 LBS | TEMPERATURE: 97.2 F | SYSTOLIC BLOOD PRESSURE: 170 MMHG | DIASTOLIC BLOOD PRESSURE: 106 MMHG | HEART RATE: 93 BPM | OXYGEN SATURATION: 96 % | HEIGHT: 72 IN | BODY MASS INDEX: 27.22 KG/M2

## 2024-10-03 DIAGNOSIS — J30.2 SEASONAL ALLERGIC RHINITIS, UNSPECIFIED TRIGGER: ICD-10-CM

## 2024-10-03 DIAGNOSIS — E66.3 OVERWEIGHT (BMI 25.0-29.9): ICD-10-CM

## 2024-10-03 DIAGNOSIS — I10 PRIMARY HYPERTENSION: ICD-10-CM

## 2024-10-03 DIAGNOSIS — J06.9 UPPER RESPIRATORY INFECTION WITH COUGH AND CONGESTION: Primary | ICD-10-CM

## 2024-10-03 DIAGNOSIS — N48.89 SEBACEOUS CYST OF PENIS: ICD-10-CM

## 2024-10-03 LAB
EXPIRATION DATE: NORMAL
FLUAV AG UPPER RESP QL IA.RAPID: NOT DETECTED
FLUBV AG UPPER RESP QL IA.RAPID: NOT DETECTED
INTERNAL CONTROL: NORMAL
Lab: NORMAL
SARS-COV-2 AG UPPER RESP QL IA.RAPID: NOT DETECTED

## 2024-10-03 PROCEDURE — 87428 SARSCOV & INF VIR A&B AG IA: CPT | Performed by: NURSE PRACTITIONER

## 2024-10-03 PROCEDURE — 99214 OFFICE O/P EST MOD 30 MIN: CPT | Performed by: NURSE PRACTITIONER

## 2024-10-03 RX ORDER — CETIRIZINE HYDROCHLORIDE 10 MG/1
10 TABLET ORAL NIGHTLY
Qty: 90 TABLET | Refills: 1 | Status: SHIPPED | OUTPATIENT
Start: 2024-10-03

## 2024-10-03 RX ORDER — LOSARTAN POTASSIUM 25 MG/1
25 TABLET ORAL DAILY
Qty: 30 TABLET | Refills: 0 | Status: SHIPPED | OUTPATIENT
Start: 2024-10-03

## 2024-10-03 NOTE — ASSESSMENT & PLAN NOTE
Patient has new onset Hypertension  Recommended strategies for weight loss.  Counseled on importance of healthy eating and regular aerobic exercise  I will start him on losartan 25 mg daily.  Advised to check his blood pressure at home if he is able to.  Advised to let me know if he has any adverse side effects or other issues.  Blood pressure will be reassessed in 4 weeks.

## 2024-10-03 NOTE — PATIENT INSTRUCTIONS
Exercising to Lose Weight  Getting regular exercise is important for everyone. It is especially important if you are overweight. Being overweight increases your risk of heart disease, stroke, diabetes, high blood pressure, and several types of cancer. Exercising, and reducing the calories you consume, can help you lose weight and improve fitness and health.  Exercise can be moderate or vigorous intensity. To lose weight, most people need to do a certain amount of moderate or vigorous-intensity exercise each week.  How can exercise affect me?  You lose weight when you exercise enough to burn more calories than you eat. Exercise also reduces body fat and builds muscle. The more muscle you have, the more calories you burn. Exercise also:  Improves mood.  Reduces stress and tension.  Improves your overall fitness, flexibility, and endurance.  Increases bone strength.  Moderate-intensity exercise    Moderate-intensity exercise is any activity that gets you moving enough to burn at least three times more energy (calories) than if you were sitting.  Examples of moderate exercise include:  Walking a mile in 15 minutes.  Doing light yard work.  Biking at an easy pace.  Most people should get at least 150 minutes of moderate-intensity exercise a week to maintain their body weight.  Vigorous-intensity exercise  Vigorous-intensity exercise is any activity that gets you moving enough to burn at least six times more calories than if you were sitting. When you exercise at this intensity, you should be working hard enough that you are not able to carry on a conversation.  Examples of vigorous exercise include:  Running.  Playing a team sport, such as football, basketball, and soccer.  Jumping rope.  Most people should get at least 75 minutes a week of vigorous exercise to maintain their body weight.  What actions can I take to lose weight?  The amount of exercise you need to lose weight depends on:  Your age.  The type of  exercise.  Any health conditions you have.  Your overall physical ability.  Talk to your health care provider about how much exercise you need and what types of activities are safe for you.  Nutrition    Make changes to your diet as told by your health care provider or diet and nutrition specialist (dietitian). This may include:  Eating fewer calories.  Eating more protein.  Eating less unhealthy fats.  Eating a diet that includes fresh fruits and vegetables, whole grains, low-fat dairy products, and lean protein.  Avoiding foods with added fat, salt, and sugar.  Drink plenty of water while you exercise to prevent dehydration or heat stroke.  Activity  Choose an activity that you enjoy and set realistic goals. Your health care provider can help you make an exercise plan that works for you.  Exercise at a moderate or vigorous intensity most days of the week.  The intensity of exercise may vary from person to person. You can tell how intense a workout is for you by paying attention to your breathing and heartbeat. Most people will notice their breathing and heartbeat get faster with more intense exercise.  Do resistance training twice each week, such as:  Push-ups.  Sit-ups.  Lifting weights.  Using resistance bands.  Getting short amounts of exercise can be just as helpful as long, structured periods of exercise. If you have trouble finding time to exercise, try doing these things as part of your daily routine:  Get up, stretch, and walk around every 30 minutes throughout the day.  Go for a walk during your lunch break.  Park your car farther away from your destination.  If you take public transportation, get off one stop early and walk the rest of the way.  Make phone calls while standing up and walking around.  Take the stairs instead of elevators or escalators.  Wear comfortable clothes and shoes with good support.  Do not exercise so much that you hurt yourself, feel dizzy, or get very short of breath.  Where to  find more information  [place] Department of Health and Human Services: www.hhs.gov  Centers for Disease Control and Prevention: www.cdc.gov  Contact a health care provider:  Before starting a new exercise program.  If you have questions or concerns about your weight.  If you have a medical problem that keeps you from exercising.  Get help right away if:  You have any of the following while exercising:  Injury.  Dizziness.  Difficulty breathing or shortness of breath that does not go away when you stop exercising.  Chest pain.  Rapid heartbeat.  These symptoms may represent a serious problem that is an emergency. Do not wait to see if the symptoms will go away. Get medical help right away. Call your local emergency services (911 in the [place] Do not drive yourself to the hospital.  Summary  Getting regular exercise is especially important if you are overweight.  Being overweight increases your risk of heart disease, stroke, diabetes, high blood pressure, and several types of cancer.  Losing weight happens when you burn more calories than you eat.  Reducing the amount of calories you eat, and getting regular moderate or vigorous exercise each week, helps you lose weight.  This information is not intended to replace advice given to you by your health care provider. Make sure you discuss any questions you have with your health care provider.  Document Revised: 02/13/2022 Document Reviewed: 02/13/2022  [employer] Patient Education © 2024 [employer]    MyPlate from [employer]    MyPlate is an outline of a general healthy diet based on the Dietary Guidelines for Americans, 8092-6112, from the U.S. Department of Agriculture (USDA). It sets guidelines for how much food you should eat from each food group based on your age, sex, and level of physical activity.  What are tips for following MyPlate?  To follow MyPlate recommendations:  Eat a wide variety of fruits and vegetables, grains, and protein foods.  Serve smaller portions and  eat less food throughout the day.  Limit portion sizes to avoid overeating.  Enjoy your food.  Get at least 150 minutes of exercise every week. This is about 30 minutes each day, 5 or more days per week.  It can be difficult to have every meal look like MyPlate. Think about MyPlate as eating guidelines for an entire day, rather than each individual meal.  Fruits and vegetables  Make one half of your plate fruits and vegetables.  Eat many different colors of fruits and vegetables each day.  For a 2,000-calorie daily food plan, eat:  2½ cups of vegetables every day.  2 cups of fruit every day.  1 cup is equal to:  1 cup raw or cooked vegetables.  1 cup raw fruit.  1 medium-sized orange, apple, or banana.  1 cup 100% fruit or vegetable juice.  2 cups raw leafy greens, such as lettuce, spinach, or kale.  ½ cup dried fruit.  Grains  One fourth of your plate should be grains.  Make at least half of the grains you eat each day whole grains.  For a 2,000-calorie daily food plan, eat 6 oz of grains every day.  1 oz is equal to:  1 slice bread.  1 cup cereal.  ½ cup cooked rice, cereal, or pasta.  Protein  One fourth of your plate should be protein.  Eat a wide variety of protein foods, including meat, poultry, fish, eggs, beans, nuts, and tofu.  For a 2,000-calorie daily food plan, eat 5½ oz of protein every day.  1 oz is equal to:  1 oz meat, poultry, or fish.  ¼ cup cooked beans.  1 egg.  ½ oz nuts or seeds.  1 Tbsp peanut butter.  Dairy  Drink fat-free or low-fat (1%) milk.  Eat or drink dairy as a side to meals.  For a 2,000-calorie daily food plan, eat or drink 3 cups of dairy every day.  1 cup is equal to:  1 cup milk, yogurt, cottage cheese, or soy milk (soy beverage).  2 oz processed cheese.  1½ oz natural cheese.  Fats, oils, salt, and sugars  Only small amounts of oils are recommended.  Avoid foods that are high in calories and low in nutritional value (empty calories), like foods high in fat or added  sugars.  Choose foods that are low in salt (sodium). Choose foods that have less than 140 milligrams (mg) of sodium per serving.  Drink water instead of sugary drinks. Drink enough fluid to keep your urine pale yellow.  Where to find support  Work with your health care provider or a dietitian to develop a customized eating plan that is right for you.  Download an zoltan (mobile application) to help you track your daily food intake.  Where to find more information  USDA: ChooseMyPlate.gov  Summary  MyPlate is a general guideline for healthy eating from the USDA. It is based on the Dietary Guidelines for Americans, 5142-6185.  In general, fruits and vegetables should take up one half of your plate, grains should take up one fourth of your plate, and protein should take up one fourth of your plate.  This information is not intended to replace advice given to you by your health care provider. Make sure you discuss any questions you have with your health care provider.  Document Revised: 11/08/2021 Document Reviewed: 11/08/2021  [employer] Patient Education © 2024 [employer]

## 2024-10-03 NOTE — ASSESSMENT & PLAN NOTE
Patient's (Body mass index is 27.26 kg/m².) indicates that they are overweight with health conditions that include hypertension . Weight is newly identified. BMI is above average; BMI management plan is completed. We discussed portion control, increasing exercise, and Information on healthy weight added to patient's after visit summary.

## 2024-10-03 NOTE — PROGRESS NOTES
Chief Complaint  Follow-up (Patient following up on impaired vision, patient received medication through another provider, patient reports some improvement), Cough (Patient reports symptoms have been going on since 9/30/2024), Generalized Body Aches, Nasal Congestion, and Fatigue    Subjective            Chris Dahl is a 44 y.o. male who presents to [facility]   History of Present Illness  He is here today for follow-up on his eye.  He follows with an optometrist.  His optometrist wanted lab workup done.  We did workup for rheumatoid/inflammatory markers which were negative.   Patient does have Crohn's disease.  He is scheduled for colonoscopy.  He states he has had to miss a lot of work due to his eyes.  He is wanting to know if he will need to fill out [facility] paperwork.  I advised him that the paperwork may need to be filled out by optometrist since it is due to his eyes.    He is having cough, generalized bodyaches, nasal congestion and fatigue that started on 9/30/2024.  In office COVID and flu swabs are negative.    BP is noted to be elevated today.     Patient states that he has a cystic-like lesion on his penis that he has had these before.  He states his wife wanted him to have it looked at.  He states he has had them before and they go away on their own.    PHQ-2 Depression Screening  Little interest or pleasure in doing things? 0-->not at all   Feeling down, depressed, or hopeless? 0-->not at all   PHQ-2 Total Score 0       Tobacco Use: High Risk (10/3/2024)    Patient History     Smoking Tobacco Use: Every Day     Smokeless Tobacco Use: Never     Passive Exposure: Past      E-cigarette/Vaping    E-cigarette/Vaping Use Never User      E-cigarette/Vaping Substances    Nicotine No     THC No     CBD No     Flavoring No      E-cigarette/Vaping Devices    Disposable No     Pre-filled or Refillable Cartridge No     Refillable Tank No     Pre-filled Pod No        Alcohol Use:  "Not on file         Objective   Vital Signs:   Vitals:    10/03/24 1444   BP: (!) 170/106  Comment: Manual   BP Location: Left arm   Patient Position: Sitting   Cuff Size: Adult   Pulse: 93   Temp: 97.2 °F (36.2 °C)   TempSrc: Temporal   SpO2: 96%   Weight: 91.2 kg (201 lb)   Height: 182.9 cm (72\")     Body mass index is 27.26 kg/m².    Wt Readings from Last 3 Encounters:   10/03/24 91.2 kg (201 lb)   08/15/24 91.3 kg (201 lb 3.2 oz)   04/10/24 89.9 kg (198 lb 3.2 oz)     BP Readings from Last 3 Encounters:   10/03/24 (!) 170/106   08/15/24 165/77   04/10/24 151/94       Health Maintenance   Topic Date Due    ANNUAL PHYSICAL  03/02/2024    INFLUENZA VACCINE  10/25/2024 (Originally 8/1/2024)    Pneumococcal Vaccine 0-64 (1 of 2 - PCV) 11/01/2024 (Originally 3/13/1986)    COVID-19 Vaccine (1 - 2023-24 season) 12/23/2024 (Originally 9/1/2024)    BMI FOLLOWUP  10/03/2025    TDAP/TD VACCINES (2 - Td or Tdap) 03/02/2033    HEPATITIS C SCREENING  Completed       BP (!) 170/106 (BP Location: Left arm, Patient Position: Sitting, Cuff Size: Adult) Comment: Manual  Pulse 93   Temp 97.2 °F (36.2 °C) (Temporal)   Ht 182.9 cm (72\")   Wt 91.2 kg (201 lb)   SpO2 96%   BMI 27.26 kg/m²       Current Outpatient Medications:     ferrous sulfate 325 (65 FE) MG tablet, TAKE 1 TABLET BY MOUTH EVERY DAY WITH BREAKFAST, Disp: 90 tablet, Rfl: 1    pantoprazole (PROTONIX) 40 MG EC tablet, Take 1 tablet by mouth Daily., Disp: 30 tablet, Rfl: 1    cetirizine (zyrTEC) 10 MG tablet, Take 1 tablet by mouth Every Night. Indications: Hayfever, Disp: 90 tablet, Rfl: 1    losartan (Cozaar) 25 MG tablet, Take 1 tablet by mouth Daily. Indications: High Blood Pressure, Disp: 30 tablet, Rfl: 0    polyethylene glycol (GoLYTELY) 236 g solution, Take per office instructions (Patient not taking: Reported on 10/3/2024), Disp: 4000 mL, Rfl: 0   Past Medical History:   Diagnosis Date    Crohn's disease         Physical Exam  Vitals reviewed. "   Constitutional:       Appearance: Normal appearance. He is well-developed.   HENT:      Right Ear: Tympanic membrane, ear canal and external ear normal.      Left Ear: Tympanic membrane, ear canal and external ear normal.      Mouth/Throat:      Mouth: Mucous membranes are moist.      Pharynx: No pharyngeal swelling, oropharyngeal exudate or posterior oropharyngeal erythema.      Comments: Postnasal drainage noted.    Neck:      Thyroid: No thyroid mass, thyromegaly or thyroid tenderness.   Cardiovascular:      Rate and Rhythm: Normal rate and regular rhythm.      Heart sounds: No murmur heard.     No friction rub. No gallop.   Pulmonary:      Effort: Pulmonary effort is normal.      Breath sounds: Normal breath sounds. No wheezing or rhonchi.   Genitourinary:     Penis: Lesions present. No tenderness.       Comments: Small cystic like Lesion noted to shaft of penis.  Lymphadenopathy:      Cervical: No cervical adenopathy.   Skin:     General: Skin is warm and dry.   Neurological:      Mental Status: He is alert and oriented to person, place, and time.      Cranial Nerves: No cranial nerve deficit.   Psychiatric:         Mood and Affect: Mood and affect normal.         Behavior: Behavior normal.         Thought Content: Thought content normal. Thought content does not include homicidal or suicidal ideation.         Judgment: Judgment normal.          Result Review :    The following data was reviewed by: VENECIA Hsieh on 10/03/2024:  POC COVID/FLU   Lab Results   Component Value Date    SARSANTIGEN Not Detected 10/03/2024    FLUAAG Not Detected 10/03/2024    FLUBAG Not Detected 10/03/2024    INTCT Passed 10/03/2024    LOTNUMBER [id number] 10/03/2024    EXPIRATDTE 10/18/2025 10/03/2024        Lysozyme  3.4 - 7.6 ug/mL 8.4 High      Assessment & Plan  Upper respiratory infection with cough and congestion  Discussed with patient upper respiratory infection is viral and antibiotics are not warranted.   Discussed symptom management, take Tylenol or ibuprofen as needed, drink lots of fluids and rest.  Instructed to call back if symptoms do not improve or worsen by day 10.  Seasonal allergic rhinitis, unspecified trigger  I will start him on Zyrtec 10 mg every evening.  Primary hypertension  Patient has new onset Hypertension  Recommended strategies for weight loss.  Counseled on importance of healthy eating and regular aerobic exercise  I will start him on losartan 25 mg daily.  Advised to check his blood pressure at home if he is able to.  Advised to let me know if he has any adverse side effects or other issues.  Blood pressure will be reassessed in 4 weeks.  Sebaceous cyst of penis  Advised patient to follow-up if the cyst does not resolve on its own.  Overweight (BMI 25.0-29.9)  Patient's (Body mass index is 27.26 kg/m².) indicates that they are overweight with health conditions that include hypertension . Weight is newly identified. BMI is above average; BMI management plan is completed. We discussed portion control, increasing exercise, and Information on healthy weight added to patient's after visit summary.     Orders Placed This Encounter   Procedures    POCT SARS-CoV-2 Antigen NATANAEL + Flu     New Medications Ordered This Visit   Medications    cetirizine (zyrTEC) 10 MG tablet     Sig: Take 1 tablet by mouth Every Night. Indications: Hayfever     Dispense:  90 tablet     Refill:  1    losartan (Cozaar) 25 MG tablet     Sig: Take 1 tablet by mouth Daily. Indications: High Blood Pressure     Dispense:  30 tablet     Refill:  0          BMI is >= 25 and <30. (Overweight) The following options were offered after discussion;: weight loss educational material (shared in after visit summary), exercise counseling/recommendations, nutrition counseling/recommendations, and information on healthy weight added to patient's after visit summary         Diagnosis Plan   1. Upper respiratory infection with cough and  congestion  POCT SARS-CoV-2 Antigen NATANAEL + Flu      2. Seasonal allergic rhinitis, unspecified trigger  cetirizine (zyrTEC) 10 MG tablet      3. Primary hypertension  losartan (Cozaar) 25 MG tablet      4. Sebaceous cyst of penis        5. Overweight (BMI 25.0-29.9)              FOLLOW UP  Return in about 4 weeks (around 10/31/2024) for Annual physical and f/u HTN.  Patient was given instructions and counseling regarding his condition or for health maintenance advice. Please see specific information pulled into the AVS if appropriate.       CURRENT & DISCONTINUED MEDICATIONS  Current Outpatient Medications   Medication Instructions    cetirizine (ZYRTEC) 10 mg, Oral, Nightly    ferrous sulfate 325 mg, Oral, Daily With Breakfast    losartan (COZAAR) 25 mg, Oral, Daily    pantoprazole (PROTONIX) 40 mg, Oral, Daily    polyethylene glycol (GoLYTELY) 236 g solution Take per office instructions       Medications Discontinued During This Encounter   Medication Reason    predniSONE (DELTASONE) 20 MG tablet *Therapy completed    prednisoLONE acetate (PRED FORTE) 1 % ophthalmic suspension *Therapy completed        Parts of this note are electronic transcriptions/translations of spoken language to printed text using the Dragon Dictation system.    Catherine Roach, VENECIA  10/03/24  15:44 EDT

## 2024-11-07 ENCOUNTER — OFFICE VISIT (OUTPATIENT)
Dept: FAMILY MEDICINE CLINIC | Facility: CLINIC | Age: 44
End: 2024-11-07
Payer: COMMERCIAL

## 2024-11-07 VITALS
BODY MASS INDEX: 26.7 KG/M2 | WEIGHT: 197.1 LBS | HEIGHT: 72 IN | OXYGEN SATURATION: 97 % | SYSTOLIC BLOOD PRESSURE: 150 MMHG | TEMPERATURE: 98 F | DIASTOLIC BLOOD PRESSURE: 102 MMHG | HEART RATE: 79 BPM

## 2024-11-07 DIAGNOSIS — I10 PRIMARY HYPERTENSION: ICD-10-CM

## 2024-11-07 DIAGNOSIS — Z00.00 ANNUAL PHYSICAL EXAM: Primary | ICD-10-CM

## 2024-11-07 DIAGNOSIS — F17.219 CIGARETTE NICOTINE DEPENDENCE WITH NICOTINE-INDUCED DISORDER: ICD-10-CM

## 2024-11-07 DIAGNOSIS — Z11.3 SCREEN FOR STD (SEXUALLY TRANSMITTED DISEASE): ICD-10-CM

## 2024-11-07 LAB
BILIRUB BLD-MCNC: NEGATIVE MG/DL
C TRACH RRNA CVX QL NAA+PROBE: NOT DETECTED
CLARITY, POC: CLEAR
COLOR UR: YELLOW
EXPIRATION DATE: NORMAL
GLUCOSE UR STRIP-MCNC: NEGATIVE MG/DL
KETONES UR QL: NEGATIVE
LEUKOCYTE EST, POC: NEGATIVE
Lab: NORMAL
N GONORRHOEA RRNA SPEC QL NAA+PROBE: NOT DETECTED
NITRITE UR-MCNC: NEGATIVE MG/ML
PH UR: 6 [PH] (ref 5–8)
PROT UR STRIP-MCNC: NEGATIVE MG/DL
RBC # UR STRIP: NEGATIVE /UL
SP GR UR: 1 (ref 1–1.03)
UROBILINOGEN UR QL: NORMAL

## 2024-11-07 PROCEDURE — 99396 PREV VISIT EST AGE 40-64: CPT | Performed by: NURSE PRACTITIONER

## 2024-11-07 PROCEDURE — 87591 N.GONORRHOEAE DNA AMP PROB: CPT | Performed by: NURSE PRACTITIONER

## 2024-11-07 PROCEDURE — 81003 URINALYSIS AUTO W/O SCOPE: CPT | Performed by: NURSE PRACTITIONER

## 2024-11-07 PROCEDURE — 87491 CHLMYD TRACH DNA AMP PROBE: CPT | Performed by: NURSE PRACTITIONER

## 2024-11-07 RX ORDER — LOSARTAN POTASSIUM 50 MG/1
50 TABLET ORAL DAILY
Qty: 30 TABLET | Refills: 2 | Status: ON HOLD | OUTPATIENT
Start: 2024-11-07

## 2024-11-07 NOTE — ASSESSMENT & PLAN NOTE
Tobacco use is improving with lifestyle modifications.  Patient not interested in quitting at this time.  Handouts provided on smoking cessation, see AVS.  Tobacco use will be reassessed at the next regular appointment.

## 2024-11-07 NOTE — ASSESSMENT & PLAN NOTE
Blood pressure still above goal.  I will increase losartan dose to 50 mg daily.  Recommended for him to quit smoking.  Patient will follow-up with me in 3 months or sooner if any new or worsening of symptoms.    Orders:    losartan (Cozaar) 50 MG tablet; Take 1 tablet by mouth Daily. Indications: High Blood Pressure    Lipid Panel; Future

## 2024-11-07 NOTE — PROGRESS NOTES
Chief Complaint  Annual Exam and Hypertension    Subjective            Chris Dahl is a 44 y.o. male who presents to [facility]   History of Present Illness  Annual physical and follow-up on hypertension.    New onset hypertension: I started him on losartan 25 mg daily. His BP is still elevated.  He does not check his blood pressure at home.    Discussed and reviewed   Alcohol Misuse Screening and Counseling, states rarely drinks 1-2 drinks.   Cardiovascular Disease Screening Tests, fasting lipid panel  Colorectal Cancer Screening, Colonoscopy, scheduled.   Counseling to Prevent Tobacco Use Chris Dahl  reports that he has been smoking cigarettes. He has a 37.5 pack-year smoking history. He has been exposed to tobacco smoke. He has never used smokeless tobacco. I have educated him on the risk of diseases from using tobacco products such as cancer, COPD, heart disease, and cataracts.     I advised him to quit and he is not willing to quit.    I spent 5 minutes counseling the patient.    Diabetes Screening-Lab Order for either glucose or A1c, he does not have any family history of diabetes.  Glaucoma screening, recommend routine vision exams.  He is following with ophthalmology.  Recommend routine biannual dental exams.  He has not had a dental exam in a long time.  Hepatitis C Virus Screening, completed.   Human Immunodeficiency Virus (HIV) Screening   Screening for Sexually Transmitted Infections (STIs), He had test for syphilis due to his eye issues which was negative.  He would like to go ahead and have other STD's done.  Prostate Cancer Screening for males ages 50-75 or sooner if any family history of prostate cancer.  He does not have any family history of prostate cancer.    [facility] IMMUNIZATIONS: Influenza and Pneumococcal 23 refused.       Tobacco Use: High Risk (11/7/2024)    Patient History     Smoking Tobacco Use: Every Day     Smokeless Tobacco Use: Never     Passive  "Exposure: Past      E-cigarette/Vaping    E-cigarette/Vaping Use Never User      E-cigarette/Vaping Substances    Nicotine No     THC No     CBD No     Flavoring No      E-cigarette/Vaping Devices    Disposable No     Pre-filled or Refillable Cartridge No     Refillable Tank No     Pre-filled Pod No        Alcohol Use: Not on file         Objective   Vital Signs:   Vitals:    11/07/24 1429 11/07/24 1434   BP: (!) 159/107 (!) 150/102   BP Location: Left arm    Patient Position: Sitting    Cuff Size: Adult    Pulse: 79    Temp: 98 °F (36.7 °C)    SpO2: 97%    Weight: 89.4 kg (197 lb 1.6 oz)    Height: 182.9 cm (72\")    PainSc:   2    PainLoc: Back      Body mass index is 26.73 kg/m².    Wt Readings from Last 3 Encounters:   11/07/24 89.4 kg (197 lb 1.6 oz)   10/03/24 91.2 kg (201 lb)   08/15/24 91.3 kg (201 lb 3.2 oz)     BP Readings from Last 3 Encounters:   11/07/24 (!) 150/102   10/03/24 (!) 170/106   08/15/24 165/77       Health Maintenance   Topic Date Due    ANNUAL PHYSICAL  03/02/2024    COVID-19 Vaccine (1 - 2024-25 season) 12/23/2024 (Originally 9/1/2024)    INFLUENZA VACCINE  03/31/2025 (Originally 8/1/2024)    Pneumococcal Vaccine 0-64 (1 of 2 - PCV) 11/07/2025 (Originally [birth year]/1988)    BMI FOLLOWUP  10/03/2025    TDAP/TD VACCINES (2 - Td or Tdap) 03/02/2033    HEPATITIS C SCREENING  Completed       BP (!) 150/102   Pulse 79   Temp 98 °F (36.7 °C)   Ht 182.9 cm (72\")   Wt 89.4 kg (197 lb 1.6 oz)   SpO2 97%   BMI 26.73 kg/m²       Current Outpatient Medications:     cetirizine (zyrTEC) 10 MG tablet, Take 1 tablet by mouth Every Night. Indications: Hayfever, Disp: 90 tablet, Rfl: 1    ferrous sulfate 325 (65 FE) MG tablet, TAKE 1 TABLET BY MOUTH EVERY DAY WITH BREAKFAST, Disp: 90 tablet, Rfl: 1    losartan (Cozaar) 50 MG tablet, Take 1 tablet by mouth Daily. Indications: High Blood Pressure, Disp: 30 tablet, Rfl: 2    polyethylene glycol (GoLYTELY) 236 g solution, Take per office instructions (Patient " not taking: Reported on 11/7/2024), Disp: 4000 mL, Rfl: 0   Past Medical History:   Diagnosis Date    Crohn's disease         Physical Exam  Vitals reviewed.   Constitutional:       Appearance: Normal appearance. He is well-developed.   HENT:      Right Ear: Tympanic membrane, ear canal and external ear normal.      Left Ear: Tympanic membrane, ear canal and external ear normal.      Mouth/Throat:      Mouth: Mucous membranes are moist.      Dentition: Abnormal dentition.      Pharynx: No pharyngeal swelling, oropharyngeal exudate or posterior oropharyngeal erythema.   Neck:      Thyroid: No thyroid mass, thyromegaly or thyroid tenderness.   Cardiovascular:      Rate and Rhythm: Normal rate and regular rhythm.      Heart sounds: No murmur heard.     No friction rub. No gallop.   Pulmonary:      Effort: Pulmonary effort is normal.      Breath sounds: Normal breath sounds. No wheezing or rhonchi.   Lymphadenopathy:      Cervical: No cervical adenopathy.   Skin:     General: Skin is warm and dry.   Neurological:      Mental Status: He is alert and oriented to person, place, and time.      Cranial Nerves: No cranial nerve deficit.   Psychiatric:         Mood and Affect: Mood and affect normal.         Behavior: Behavior normal.         Thought Content: Thought content normal. Thought content does not include homicidal or suicidal ideation.         Judgment: Judgment normal.          Result Review :    The following data was reviewed by: VENECIA Hsieh on 11/07/2024:  Common Labs   Common labs          6/12/2024    14:43   Common Labs   Glucose 86    BUN 6    Creatinine 0.76    Sodium 140    Potassium 3.9    Chloride 105    Calcium 9.0    Albumin 3.7    Total Bilirubin 0.2    Alkaline Phosphatase 141    AST (SGOT) 10    ALT (SGPT) 16    WBC 11.37    Hemoglobin 16.1    Hematocrit 47.9    Platelets 369        Assessment & Plan  Annual physical exam  Health maintenance and prevention discussed, handouts provided,  see AVS.  Orders:    TSH Rfx On Abnormal To Free T4; Future    Lipid Panel; Future    POCT urinalysis dipstick, automated    Primary hypertension  Blood pressure still above goal.  I will increase losartan dose to 50 mg daily.  Recommended for him to quit smoking.  Patient will follow-up with me in 3 months or sooner if any new or worsening of symptoms.    Orders:    losartan (Cozaar) 50 MG tablet; Take 1 tablet by mouth Daily. Indications: High Blood Pressure    Lipid Panel; Future    Screen for STD (sexually transmitted disease)    Orders:    HIV-1 / O / 2 Ag / Antibody; Future    Chlamydia trachomatis, Neisseria gonorrhoeae, PCR - , Urine, Random Void    HSV 1 & 2 - Specific Antibody, IgG; Future    Cigarette nicotine dependence with nicotine-induced disorder  Tobacco use is improving with lifestyle modifications.  Patient not interested in quitting at this time.  Handouts provided on smoking cessation, see AVS.  Tobacco use will be reassessed at the next regular appointment.             Diagnosis Plan   1. Annual physical exam  TSH Rfx On Abnormal To Free T4    Lipid Panel    POCT urinalysis dipstick, automated      2. Primary hypertension  losartan (Cozaar) 50 MG tablet    Lipid Panel      3. Screen for STD (sexually transmitted disease)  HIV-1 / O / 2 Ag / Antibody    Chlamydia trachomatis, Neisseria gonorrhoeae, PCR - , Urine, Random Void    HSV 1 & 2 - Specific Antibody, IgG      4. Cigarette nicotine dependence with nicotine-induced disorder              FOLLOW UP  Return in about 3 months (around 2/7/2025) for Next scheduled follow up HTN.  Patient was given instructions and counseling regarding his condition or for health maintenance advice. Please see specific information pulled into the AVS if appropriate.       CURRENT & DISCONTINUED MEDICATIONS  Current Outpatient Medications   Medication Instructions    cetirizine (ZYRTEC) 10 mg, Oral, Nightly    ferrous sulfate 325 mg, Oral, Daily With Breakfast     losartan (COZAAR) 50 mg, Oral, Daily    polyethylene glycol (GoLYTELY) 236 g solution Take per office instructions       Medications Discontinued During This Encounter   Medication Reason    pantoprazole (PROTONIX) 40 MG EC tablet *Therapy completed    losartan (Cozaar) 25 MG tablet Reorder        Parts of this note are electronic transcriptions/translations of spoken language to printed text using the Dragon Dictation system.    Catherine Roach, VENECIA  11/07/24  16:40 EST

## 2024-11-10 ENCOUNTER — HOSPITAL ENCOUNTER (INPATIENT)
Facility: HOSPITAL | Age: 44
LOS: 2 days | Discharge: HOME | End: 2024-11-12
Attending: EMERGENCY MEDICINE | Admitting: STUDENT IN AN ORGANIZED HEALTH CARE EDUCATION/TRAINING PROGRAM
Payer: COMMERCIAL

## 2024-11-10 ENCOUNTER — APPOINTMENT (OUTPATIENT)
Dept: CT IMAGING | Facility: HOSPITAL | Age: 44
End: 2024-11-10
Payer: COMMERCIAL

## 2024-11-10 DIAGNOSIS — K56.609 SMALL BOWEL OBSTRUCTION: ICD-10-CM

## 2024-11-10 DIAGNOSIS — K50.112 CROHN'S DISEASE OF COLON WITH INTESTINAL OBSTRUCTION: ICD-10-CM

## 2024-11-10 DIAGNOSIS — K52.9 COLITIS: Primary | ICD-10-CM

## 2024-11-10 PROBLEM — K56.600 PARTIAL SMALL BOWEL OBSTRUCTION: Status: ACTIVE | Noted: 2024-11-10

## 2024-11-10 LAB
ALBUMIN SERPL-MCNC: 4.8 G/DL (ref 3.5–5.2)
ALBUMIN/GLOB SERPL: 1.1 G/DL
ALP SERPL-CCNC: 187 U/L (ref 39–117)
ALT SERPL W P-5'-P-CCNC: 24 U/L (ref 1–41)
ANION GAP SERPL CALCULATED.3IONS-SCNC: 15.9 MMOL/L (ref 5–15)
AST SERPL-CCNC: 17 U/L (ref 1–40)
BACTERIA UR QL AUTO: ABNORMAL /HPF
BASOPHILS # BLD AUTO: 0.09 10*3/MM3 (ref 0–0.2)
BASOPHILS NFR BLD AUTO: 0.4 % (ref 0–1.5)
BILIRUB SERPL-MCNC: 0.4 MG/DL (ref 0–1.2)
BILIRUB UR QL STRIP: NEGATIVE
BUN SERPL-MCNC: 7 MG/DL (ref 6–20)
BUN/CREAT SERPL: 5.3 (ref 7–25)
CALCIUM SPEC-SCNC: 10.5 MG/DL (ref 8.6–10.5)
CHLORIDE SERPL-SCNC: 101 MMOL/L (ref 98–107)
CLARITY UR: CLEAR
CO2 SERPL-SCNC: 23.1 MMOL/L (ref 22–29)
COLOR UR: YELLOW
CREAT SERPL-MCNC: 1.32 MG/DL (ref 0.76–1.27)
CRP SERPL-MCNC: 1.66 MG/DL (ref 0–0.5)
D-LACTATE SERPL-SCNC: 1 MMOL/L (ref 0.5–2)
DEPRECATED RDW RBC AUTO: 47.3 FL (ref 37–54)
EGFRCR SERPLBLD CKD-EPI 2021: 69.1 ML/MIN/1.73
EOSINOPHIL # BLD AUTO: 0.19 10*3/MM3 (ref 0–0.4)
EOSINOPHIL NFR BLD AUTO: 0.9 % (ref 0.3–6.2)
ERYTHROCYTE [DISTWIDTH] IN BLOOD BY AUTOMATED COUNT: 15.2 % (ref 12.3–15.4)
GLOBULIN UR ELPH-MCNC: 4.2 GM/DL
GLUCOSE SERPL-MCNC: 149 MG/DL (ref 65–99)
GLUCOSE UR STRIP-MCNC: NEGATIVE MG/DL
HCT VFR BLD AUTO: 59.9 % (ref 37.5–51)
HGB BLD-MCNC: 19.7 G/DL (ref 13–17.7)
HGB UR QL STRIP.AUTO: NEGATIVE
HOLD SPECIMEN: NORMAL
HOLD SPECIMEN: NORMAL
HYALINE CASTS UR QL AUTO: ABNORMAL /LPF
IMM GRANULOCYTES # BLD AUTO: 0.1 10*3/MM3 (ref 0–0.05)
IMM GRANULOCYTES NFR BLD AUTO: 0.5 % (ref 0–0.5)
KETONES UR QL STRIP: NEGATIVE
LEUKOCYTE ESTERASE UR QL STRIP.AUTO: NEGATIVE
LIPASE SERPL-CCNC: 30 U/L (ref 13–60)
LYMPHOCYTES # BLD AUTO: 2.81 10*3/MM3 (ref 0.7–3.1)
LYMPHOCYTES NFR BLD AUTO: 13 % (ref 19.6–45.3)
MCH RBC QN AUTO: 29.4 PG (ref 26.6–33)
MCHC RBC AUTO-ENTMCNC: 32.9 G/DL (ref 31.5–35.7)
MCV RBC AUTO: 89.5 FL (ref 79–97)
MONOCYTES # BLD AUTO: 1.53 10*3/MM3 (ref 0.1–0.9)
MONOCYTES NFR BLD AUTO: 7.1 % (ref 5–12)
NEUTROPHILS NFR BLD AUTO: 16.93 10*3/MM3 (ref 1.7–7)
NEUTROPHILS NFR BLD AUTO: 78.1 % (ref 42.7–76)
NITRITE UR QL STRIP: NEGATIVE
NRBC BLD AUTO-RTO: 0 /100 WBC (ref 0–0.2)
PH UR STRIP.AUTO: 5.5 [PH] (ref 5–8)
PLATELET # BLD AUTO: 406 10*3/MM3 (ref 140–450)
PMV BLD AUTO: 9.6 FL (ref 6–12)
POTASSIUM SERPL-SCNC: 4.2 MMOL/L (ref 3.5–5.2)
PROT SERPL-MCNC: 9 G/DL (ref 6–8.5)
PROT UR QL STRIP: ABNORMAL
RBC # BLD AUTO: 6.69 10*6/MM3 (ref 4.14–5.8)
RBC # UR STRIP: ABNORMAL /HPF
REF LAB TEST METHOD: ABNORMAL
SODIUM SERPL-SCNC: 140 MMOL/L (ref 136–145)
SP GR UR STRIP: >1.03 (ref 1–1.03)
SQUAMOUS #/AREA URNS HPF: ABNORMAL /HPF
UROBILINOGEN UR QL STRIP: ABNORMAL
WBC # UR STRIP: ABNORMAL /HPF
WBC NRBC COR # BLD AUTO: 21.65 10*3/MM3 (ref 3.4–10.8)
WHOLE BLOOD HOLD COAG: NORMAL
WHOLE BLOOD HOLD SPECIMEN: NORMAL

## 2024-11-10 PROCEDURE — 80053 COMPREHEN METABOLIC PANEL: CPT

## 2024-11-10 PROCEDURE — 25010000002 METHYLPREDNISOLONE PER 125 MG: Performed by: EMERGENCY MEDICINE

## 2024-11-10 PROCEDURE — 86140 C-REACTIVE PROTEIN: CPT | Performed by: STUDENT IN AN ORGANIZED HEALTH CARE EDUCATION/TRAINING PROGRAM

## 2024-11-10 PROCEDURE — 25010000002 ONDANSETRON PER 1 MG

## 2024-11-10 PROCEDURE — 99285 EMERGENCY DEPT VISIT HI MDM: CPT

## 2024-11-10 PROCEDURE — 81001 URINALYSIS AUTO W/SCOPE: CPT | Performed by: EMERGENCY MEDICINE

## 2024-11-10 PROCEDURE — 25810000003 SODIUM CHLORIDE 0.9 % SOLUTION: Performed by: EMERGENCY MEDICINE

## 2024-11-10 PROCEDURE — 25010000002 LEVOFLOXACIN PER 250 MG: Performed by: EMERGENCY MEDICINE

## 2024-11-10 PROCEDURE — 36415 COLL VENOUS BLD VENIPUNCTURE: CPT

## 2024-11-10 PROCEDURE — 87040 BLOOD CULTURE FOR BACTERIA: CPT | Performed by: EMERGENCY MEDICINE

## 2024-11-10 PROCEDURE — 99222 1ST HOSP IP/OBS MODERATE 55: CPT | Performed by: STUDENT IN AN ORGANIZED HEALTH CARE EDUCATION/TRAINING PROGRAM

## 2024-11-10 PROCEDURE — 74177 CT ABD & PELVIS W/CONTRAST: CPT

## 2024-11-10 PROCEDURE — 83605 ASSAY OF LACTIC ACID: CPT | Performed by: EMERGENCY MEDICINE

## 2024-11-10 PROCEDURE — 25010000002 METRONIDAZOLE 500 MG/100ML SOLUTION: Performed by: EMERGENCY MEDICINE

## 2024-11-10 PROCEDURE — 25510000001 IOPAMIDOL PER 1 ML: Performed by: EMERGENCY MEDICINE

## 2024-11-10 PROCEDURE — 83690 ASSAY OF LIPASE: CPT

## 2024-11-10 PROCEDURE — 25010000002 HEPARIN (PORCINE) PER 1000 UNITS: Performed by: STUDENT IN AN ORGANIZED HEALTH CARE EDUCATION/TRAINING PROGRAM

## 2024-11-10 PROCEDURE — 85025 COMPLETE CBC W/AUTO DIFF WBC: CPT

## 2024-11-10 PROCEDURE — 25810000003 SODIUM CHLORIDE 0.9 % SOLUTION: Performed by: STUDENT IN AN ORGANIZED HEALTH CARE EDUCATION/TRAINING PROGRAM

## 2024-11-10 RX ORDER — BISACODYL 10 MG
10 SUPPOSITORY, RECTAL RECTAL DAILY PRN
Status: DISCONTINUED | OUTPATIENT
Start: 2024-11-10 | End: 2024-11-12 | Stop reason: HOSPADM

## 2024-11-10 RX ORDER — ONDANSETRON 2 MG/ML
4 INJECTION INTRAMUSCULAR; INTRAVENOUS ONCE
Status: COMPLETED | OUTPATIENT
Start: 2024-11-10 | End: 2024-11-10

## 2024-11-10 RX ORDER — AMOXICILLIN 250 MG
2 CAPSULE ORAL 2 TIMES DAILY PRN
Status: DISCONTINUED | OUTPATIENT
Start: 2024-11-10 | End: 2024-11-12 | Stop reason: HOSPADM

## 2024-11-10 RX ORDER — CETIRIZINE HYDROCHLORIDE 10 MG/1
10 TABLET ORAL NIGHTLY
Status: DISCONTINUED | OUTPATIENT
Start: 2024-11-10 | End: 2024-11-12 | Stop reason: HOSPADM

## 2024-11-10 RX ORDER — METHYLPREDNISOLONE SODIUM SUCCINATE 125 MG/2ML
60 INJECTION, POWDER, LYOPHILIZED, FOR SOLUTION INTRAMUSCULAR; INTRAVENOUS EVERY 8 HOURS
Status: DISCONTINUED | OUTPATIENT
Start: 2024-11-11 | End: 2024-11-12

## 2024-11-10 RX ORDER — LEVOFLOXACIN 5 MG/ML
750 INJECTION, SOLUTION INTRAVENOUS ONCE
Status: COMPLETED | OUTPATIENT
Start: 2024-11-10 | End: 2024-11-10

## 2024-11-10 RX ORDER — SODIUM CHLORIDE 0.9 % (FLUSH) 0.9 %
10 SYRINGE (ML) INJECTION AS NEEDED
Status: DISCONTINUED | OUTPATIENT
Start: 2024-11-10 | End: 2024-11-12 | Stop reason: HOSPADM

## 2024-11-10 RX ORDER — HEPARIN SODIUM 5000 [USP'U]/ML
5000 INJECTION, SOLUTION INTRAVENOUS; SUBCUTANEOUS EVERY 12 HOURS SCHEDULED
Status: DISCONTINUED | OUTPATIENT
Start: 2024-11-10 | End: 2024-11-12 | Stop reason: HOSPADM

## 2024-11-10 RX ORDER — PROCHLORPERAZINE EDISYLATE 5 MG/ML
5 INJECTION INTRAMUSCULAR; INTRAVENOUS EVERY 6 HOURS PRN
Status: DISCONTINUED | OUTPATIENT
Start: 2024-11-10 | End: 2024-11-12 | Stop reason: HOSPADM

## 2024-11-10 RX ORDER — ACETAMINOPHEN 325 MG/1
650 TABLET ORAL EVERY 6 HOURS PRN
Status: DISCONTINUED | OUTPATIENT
Start: 2024-11-10 | End: 2024-11-12 | Stop reason: HOSPADM

## 2024-11-10 RX ORDER — METHYLPREDNISOLONE SODIUM SUCCINATE 125 MG/2ML
60 INJECTION, POWDER, LYOPHILIZED, FOR SOLUTION INTRAMUSCULAR; INTRAVENOUS ONCE
Status: COMPLETED | OUTPATIENT
Start: 2024-11-10 | End: 2024-11-10

## 2024-11-10 RX ORDER — BISACODYL 5 MG/1
5 TABLET, DELAYED RELEASE ORAL DAILY PRN
Status: DISCONTINUED | OUTPATIENT
Start: 2024-11-10 | End: 2024-11-12 | Stop reason: HOSPADM

## 2024-11-10 RX ORDER — ALUMINA, MAGNESIA, AND SIMETHICONE 2400; 2400; 240 MG/30ML; MG/30ML; MG/30ML
15 SUSPENSION ORAL EVERY 6 HOURS PRN
Status: DISCONTINUED | OUTPATIENT
Start: 2024-11-10 | End: 2024-11-12 | Stop reason: HOSPADM

## 2024-11-10 RX ORDER — IOPAMIDOL 755 MG/ML
100 INJECTION, SOLUTION INTRAVASCULAR
Status: COMPLETED | OUTPATIENT
Start: 2024-11-10 | End: 2024-11-10

## 2024-11-10 RX ORDER — METRONIDAZOLE 500 MG/100ML
500 INJECTION, SOLUTION INTRAVENOUS ONCE
Status: COMPLETED | OUTPATIENT
Start: 2024-11-10 | End: 2024-11-10

## 2024-11-10 RX ORDER — SODIUM CHLORIDE 9 MG/ML
100 INJECTION, SOLUTION INTRAVENOUS CONTINUOUS
Status: DISCONTINUED | OUTPATIENT
Start: 2024-11-10 | End: 2024-11-12

## 2024-11-10 RX ORDER — METRONIDAZOLE 500 MG/1
500 TABLET ORAL EVERY 8 HOURS SCHEDULED
Status: DISCONTINUED | OUTPATIENT
Start: 2024-11-11 | End: 2024-11-12 | Stop reason: HOSPADM

## 2024-11-10 RX ORDER — LEVOFLOXACIN 500 MG/1
750 TABLET, FILM COATED ORAL EVERY 24 HOURS
Status: DISCONTINUED | OUTPATIENT
Start: 2024-11-11 | End: 2024-11-12 | Stop reason: HOSPADM

## 2024-11-10 RX ORDER — HYDROCORTISONE 25 MG/G
CREAM TOPICAL 2 TIMES DAILY
Status: DISCONTINUED | OUTPATIENT
Start: 2024-11-10 | End: 2024-11-12 | Stop reason: HOSPADM

## 2024-11-10 RX ORDER — SODIUM CHLORIDE 9 MG/ML
40 INJECTION, SOLUTION INTRAVENOUS AS NEEDED
Status: DISCONTINUED | OUTPATIENT
Start: 2024-11-10 | End: 2024-11-12 | Stop reason: HOSPADM

## 2024-11-10 RX ORDER — SODIUM CHLORIDE 0.9 % (FLUSH) 0.9 %
10 SYRINGE (ML) INJECTION EVERY 12 HOURS SCHEDULED
Status: DISCONTINUED | OUTPATIENT
Start: 2024-11-10 | End: 2024-11-12 | Stop reason: HOSPADM

## 2024-11-10 RX ORDER — POLYETHYLENE GLYCOL 3350 17 G/17G
17 POWDER, FOR SOLUTION ORAL DAILY PRN
Status: DISCONTINUED | OUTPATIENT
Start: 2024-11-10 | End: 2024-11-12 | Stop reason: HOSPADM

## 2024-11-10 RX ADMIN — LEVOFLOXACIN 750 MG: 5 INJECTION, SOLUTION INTRAVENOUS at 19:30

## 2024-11-10 RX ADMIN — CETIRIZINE HYDROCHLORIDE 10 MG: 10 TABLET, FILM COATED ORAL at 22:26

## 2024-11-10 RX ADMIN — HEPARIN SODIUM 5000 UNITS: 5000 INJECTION INTRAVENOUS; SUBCUTANEOUS at 22:26

## 2024-11-10 RX ADMIN — HYDROCORTISONE 2.5%: 25 CREAM TOPICAL at 22:31

## 2024-11-10 RX ADMIN — ONDANSETRON 4 MG: 2 INJECTION INTRAMUSCULAR; INTRAVENOUS at 11:54

## 2024-11-10 RX ADMIN — SODIUM CHLORIDE 100 ML/HR: 9 INJECTION, SOLUTION INTRAVENOUS at 22:26

## 2024-11-10 RX ADMIN — METHYLPREDNISOLONE SODIUM SUCCINATE 60 MG: 125 INJECTION, POWDER, FOR SOLUTION INTRAMUSCULAR; INTRAVENOUS at 17:32

## 2024-11-10 RX ADMIN — SODIUM CHLORIDE 2000 ML: 9 INJECTION, SOLUTION INTRAVENOUS at 17:35

## 2024-11-10 RX ADMIN — IOPAMIDOL 80 ML: 755 INJECTION, SOLUTION INTRAVENOUS at 13:59

## 2024-11-10 RX ADMIN — Medication 10 ML: at 22:26

## 2024-11-10 RX ADMIN — METRONIDAZOLE 500 MG: 5 INJECTION, SOLUTION INTRAVENOUS at 17:38

## 2024-11-10 NOTE — ED PROVIDER NOTES
Time: 1:07 PM EST  Date of encounter:  11/10/2024  Independent Historian/Clinical History and Information was obtained by:   Patient    History is limited by: N/A    Chief Complaint: Abdominal pain      History of Present Illness:  Patient is a 44 y.o. year old male who presents to the emergency department for evaluation of abdominal pain, nausea, vomiting, diarrhea.  Patient states this started today.  History positive for Crohn's disease.  He had to have part of his small intestine removed approximately 20 years ago but otherwise has had no further abdominal surgeries.  Patient is complaining of pain on his left side.  Patient states the pain is a sharp cramping sensation.  Denies fever or exposure to recent illness.  VENECIA Burns FNP-C/ENP-C      Patient Care Team  Primary Care Provider: Catherine Roach APRN    Past Medical History:     No Known Allergies  Past Medical History:   Diagnosis Date    Crohn's disease      Past Surgical History:   Procedure Laterality Date    ABDOMINAL SURGERY      partial colonectomy    APPENDECTOMY      COLONOSCOPY      COLONOSCOPY N/A 2/22/2024    Procedure: COLONOSCOPY WITH BIOPSIES;  Surgeon: Adis Blackman MD;  Location: [facility] ENDOSCOPY;  Service: Gastroenterology;  Laterality: N/A;  TERMINAL ILEUM ULCER, PREVIOUS SURGERY, HEMORRHOIDS     Family History   Problem Relation Age of Onset    Crohn's disease Paternal Aunt     Colon cancer Neg Hx        Home Medications:  Prior to Admission medications    Medication Sig Start Date End Date Taking? Authorizing Provider   cetirizine (zyrTEC) 10 MG tablet Take 1 tablet by mouth Every Night. Indications: Hayfever 10/3/24   Catherine Roach APRN   ferrous sulfate 325 (65 FE) MG tablet TAKE 1 TABLET BY MOUTH EVERY DAY WITH BREAKFAST 12/22/23   Angelia Jurado APRN   losartan (Cozaar) 50 MG tablet Take 1 tablet by mouth Daily. Indications: High Blood Pressure 11/7/24   Catherine Roach APRN   polyethylene glycol  "(GoLYTELY) 236 g solution Take per office instructions  Patient not taking: Reported on 11/7/2024 8/15/24   Kari Phipps, APRN        Social History:   Social History     Tobacco Use    Smoking status: Every Day     Current packs/day: 1.50     Average packs/day: 1.5 packs/day for 25.0 years (37.5 ttl pk-yrs)     Types: Cigarettes     Passive exposure: Past    Smokeless tobacco: Never   Vaping Use    Vaping status: Never Used   Substance Use Topics    Alcohol use: Yes     Comment: occ    Drug use: Never         Review of Systems:  Review of Systems   Gastrointestinal:  Positive for abdominal pain, diarrhea, nausea and vomiting.        Physical Exam:  /96 (BP Location: Right arm, Patient Position: Lying)   Pulse 110   Temp 97.9 °F (36.6 °C) (Oral)   Resp 20   Ht 182.9 cm (72\")   Wt 87.8 kg (193 lb 9 oz)   SpO2 97%   BMI 26.25 kg/m²     Physical Exam  Constitutional:       Appearance: Normal appearance.   HENT:      Head: Normocephalic.   Eyes:      Extraocular Movements: Extraocular movements intact.      Conjunctiva/sclera: Conjunctivae normal.   Pulmonary:      Effort: Pulmonary effort is normal.   Abdominal:      General: There is no distension.   Skin:     General: Skin is warm.      Coloration: Skin is not cyanotic.   Neurological:      Mental Status: He is alert and oriented to person, place, and time.   Psychiatric:         Attention and Perception: Attention and perception normal.         Mood and Affect: Mood normal.                  Procedures:  Procedures      Medical Decision Making:      Comorbidities that affect care:        External Notes reviewe      The following orders were placed and all results were independently analyzed by me:  Orders Placed This Encounter   Procedures    [place] Draw    Comprehensive Metabolic Panel    Lipase    Urinalysis With Microscopic If Indicated (No Culture) - Urine, Clean Catch    CBC Auto Differential    NPO Diet NPO Type: Strict NPO    Undress " & Gown    Insert Peripheral IV    CBC & Differential    Green Top (Gel)    Lavender Top    Gold Top - SST    Light Blue Top       Medications Given in the Emergency Department:  Medications   sodium chloride 0.9 % flush 10 mL (has no administration in time range)   ondansetron (ZOFRAN) injection 4 mg (4 mg Intravenous Given 11/10/24 1154)        ED Course:    ED Course as of 11/11/24 1641   Sun Nov 10, 2024   1306 -- PROVIDER IN TRIAGE NOTE ---    The patient was evaluated by me, VENECIA Torres, in triage. Orders were placed and the patient is currently awaiting disposition.    [CB]   Mon Nov 11, 2024   1641 See full note from admitting ED  provider.  [CB]      ED Course User Index  [CB] Zenaida Pearce APRN       Labs:    Lab Results (last 24 hours)       Procedure Component Value Units Date/Time    CBC & Differential [[id number]]  (Abnormal) Collected: 11/10/24 1148    Specimen: Blood Updated: 11/10/24 1155    Narrative:      The following orders were created for panel order CBC & Differential.  Procedure                               Abnormality         Status                     ---------                               -----------         ------                     CBC Auto Differential[[id number]]        Abnormal            Final result                 Please view results for these tests on the individual orders.    Comprehensive Metabolic Panel [[id number]]  (Abnormal) Collected: 11/10/24 1148    Specimen: Blood Updated: 11/10/24 1211     Glucose 149 mg/dL      BUN 7 mg/dL      Creatinine 1.32 mg/dL      Sodium 140 mmol/L      Potassium 4.2 mmol/L      Chloride 101 mmol/L      CO2 23.1 mmol/L      Calcium 10.5 mg/dL      Total Protein 9.0 g/dL      Albumin 4.8 g/dL      ALT (SGPT) 24 U/L      AST (SGOT) 17 U/L      Alkaline Phosphatase 187 U/L      Total Bilirubin 0.4 mg/dL      Globulin 4.2 gm/dL      A/G Ratio 1.1 g/dL      BUN/Creatinine Ratio 5.3     Anion Gap 15.9 mmol/L      eGFR 69.1 mL/min/1.73      Narrative:      GFR Normal >60  Chronic Kidney Disease <60  Kidney Failure <15      Lipase [[id number]]  (Normal) Collected: 11/10/24 1148    Specimen: Blood Updated: 11/10/24 1211     Lipase 30 U/L     CBC Auto Differential [[id number]]  (Abnormal) Collected: 11/10/24 1148    Specimen: Blood Updated: 11/10/24 1155     WBC 21.65 10*3/mm3      RBC 6.69 10*6/mm3      Hemoglobin 19.7 g/dL      Hematocrit 59.9 %      MCV 89.5 fL      MCH 29.4 pg      MCHC 32.9 g/dL      RDW 15.2 %      RDW-SD 47.3 fl      MPV 9.6 fL      Platelets 406 10*3/mm3      Neutrophil % 78.1 %      Lymphocyte % 13.0 %      Monocyte % 7.1 %      Eosinophil % 0.9 %      Basophil % 0.4 %      Immature Grans % 0.5 %      Neutrophils, Absolute 16.93 10*3/mm3      Lymphocytes, Absolute 2.81 10*3/mm3      Monocytes, Absolute 1.53 10*3/mm3      Eosinophils, Absolute 0.19 10*3/mm3      Basophils, Absolute 0.09 10*3/mm3      Immature Grans, Absolute 0.10 10*3/mm3      nRBC 0.0 /100 WBC              Imaging:    No Radiology Exams Resulted Within Past 24 Hours      Differential Diagnosis and Discussion:            MDM                     Patient Care Considerations:        Consultants/Shared Management Plan:        Social Determinants of Health:          Disposition and Care Coordination:            Final diagnoses:   None        ED Disposition       None            This medical record created using voice recognition software.             Zenaida Pearce, APRN  11/11/24 1083

## 2024-11-10 NOTE — H&P
[facility]   HOSPITALIST HISTORY AND PHYSICAL  Date: 11/10/2024   Patient Name: Chris Dahl  : 1980  MRN: [id number]  Primary Care Physician:  Catherine Roach APRN  Date of admission: 11/10/2024    Subjective   Subjective     Chief Complaint: Abdominal pain, nausea vomiting    HPI:    Chris Dahl is a 44 y.o. male past medical history of Crohn's disease status post right hemicolectomy who presents to the ER due to new onset abdominal pain, nausea vomiting.  Patient states that he was in his normal state of health until a few days ago when he noticed his external hemorrhoids flaring up.  He was able to tolerate diet without any pain, melena or hematochezia until this morning when he started to have cramping abdominal pain associated with multiple bouts of nonbloody emesis with inability to tolerate p.o. which prompted him to come into the ER    Upon arrival patient was tachycardic but otherwise stable.  Lab workup revealed significant hemoconcentration with elevated hemoglobin 19.7, leukocytosis 21,000, elevated protein of 9 and elevated alkaline phosphatase of 187.  CT of the abdomen with contrast showed wall thickening of neoterminal small bowel at the anastomosis as well as active Crohn's enteritis and diffuse small bowel distention proximal to anastomosis compatible with an SBO.  There was probable wall thickening of additional small bowel proximal to the neoterminal small bowel compatible with an additional segment of active enteritis.  GI was consulted by the ER and advised initiation of steroids and antibiotics as well as fluid hydration.  Hospitalist then contacted for admission.  The time exam patient states his cramping abdominal pain is slightly improved.  He states he has been having some loose stools since this morning.  Denies having any headache.  No chest pain or palpitations.  No melena or hematochezia.  Does complain of painful external hemorrhoids that are flared up  recently.      Personal History     Past Medical History:  Past Medical History:   Diagnosis Date    Crohn's disease          Past Surgical History:  Past Surgical History:   Procedure Laterality Date    ABDOMINAL SURGERY      partial colonectomy    APPENDECTOMY      COLONOSCOPY      COLONOSCOPY N/A 2/22/2024    Procedure: COLONOSCOPY WITH BIOPSIES;  Surgeon: Adis Blackman MD;  Location: [facility] ENDOSCOPY;  Service: Gastroenterology;  Laterality: N/A;  TERMINAL ILEUM ULCER, PREVIOUS SURGERY, HEMORRHOIDS         Family History:   Reviewed and noncontributory except as mentioned in HPI    Social History:   Social Drivers of Health     Tobacco Use: High Risk (11/10/2024)    Patient History     Smoking Tobacco Use: Every Day     Smokeless Tobacco Use: Never     Passive Exposure: Past   Alcohol Use: Not on file   Financial Resource Strain: Not on file   Food Insecurity: Not on file   Transportation Needs: Not on file   Physical Activity: Not on file   Stress: Not on file   Social Connections: Not on file   Interpersonal Safety: Not At Risk (11/10/2024)    Abuse Screen     Unsafe at Home or Work/School: no     Feels Threatened by Someone?: no     Does Anyone Keep You from Contacting Others or Doint Things Outside the Home?: no     Physical Sign of Abuse Present: no   Depression: Not at risk (10/3/2024)    PHQ-2     PHQ-2 Score: 0   Housing Stability: Not on file   Utilities: Not on file   Health Literacy: Not on file   Employment: Not on file   Disabilities: Not At Risk (2/22/2024)    Disabilities     Concentrating, Remembering, or Making Decisions Difficulty: no     Doing Errands Independently Difficulty: no         Home Medications:  cetirizine, ferrous sulfate, and losartan    Allergies:  No Known Allergies    Review of Systems   All systems were reviewed and negative except for: Abdominal pain, nausea, vomiting,     Objective   Objective     Vitals:   Temp:  [97.9 °F (36.6 °C)-98 °F (36.7 °C)] 98 °F (36.7  °C)  Heart Rate:  [] 98  Resp:  [18-20] 18  BP: (127-133)/(77-96) 127/77    Physical Exam    Constitutional: Awake, alert, no acute distress   Eyes: Pupils equal, sclerae anicteric, no conjunctival injection   HENT: NCAT, mucous membranes moist   Neck: Supple, no thyromegaly, no lymphadenopathy, trachea midline   Respiratory: Clear to auscultation bilaterally, nonlabored respirations    Cardiovascular: RRR, no murmurs, rubs, or gallops, palpable pedal pulses bilaterally   Gastrointestinal: Positive bowel sounds, soft, nontender, nondistended   Musculoskeletal: No bilateral ankle edema, no clubbing or cyanosis to extremities   Psychiatric: Appropriate affect, cooperative   Neurologic: Oriented x 3, strength symmetric in all extremities, Cranial Nerves grossly intact to confrontation, speech clear   Skin: No rashes     Result Review    Result Review:  I have personally reviewed the results from the time of this admission to 11/10/2024 17:58 EST and agree with these findings:  Laboratory  Microbiology  Radiology  EKG/Telemetry   Cardiology/Vascular   Pathology  Old records  Other:      Assessment & Plan   Assessment / Plan     Assessment/Plan:   Partial small bowel obstruction likely secondary to Crohn's flare  Acute Crohn's flare  Leukocytosis likely reactive to above  External hemorrhoids  Polycythemia likely secondary hemoconcentration from volume loss    Plan  - Admit to hospitalist service  - Continue hydration and antibiotics with Levaquin Flagyl per GI recommendations.  Keep n.p.o. except for ice chips and sips with meds tonight, history suggest this is partial small bowel and not a complete obstruction due to his diarrhea at home.  Supportive care with antiemetics and pain control  - Continue Solu-Medrol 60 every 8 per GI recommendations.  GI has been consulted and further recommendations will be appreciated regarding tapering of steroids  - Continue to trend leukocytosis, fever curve.  - Trend  polycythemia for improvement with aggressive hydration.  - I have started Anusol cream for external hemorrhoids  - Continue to monitor daily labs including CBC and BMP      Discussed with ER Physician and Nurse    All labs/imaging studies were personally reviewed and findings are as noted above      DVT Prophylaxis: Heparin    CODE STATUS:    Code Status (Patient has no pulse and is not breathing): CPR (Attempt to Resuscitate)  Medical Interventions (Patient has pulse or is breathing): Full Support      Admission Status:  I believe this patient meets inpatient status.    Electronically signed by Tio Gallegos MD, 11/10/24, 5:58 PM EST.

## 2024-11-11 LAB
ANION GAP SERPL CALCULATED.3IONS-SCNC: 9.3 MMOL/L (ref 5–15)
BASOPHILS # BLD AUTO: 0.03 10*3/MM3 (ref 0–0.2)
BASOPHILS NFR BLD AUTO: 0.2 % (ref 0–1.5)
BUN SERPL-MCNC: 11 MG/DL (ref 6–20)
BUN/CREAT SERPL: 13.6 (ref 7–25)
CALCIUM SPEC-SCNC: 7.9 MG/DL (ref 8.6–10.5)
CHLORIDE SERPL-SCNC: 109 MMOL/L (ref 98–107)
CO2 SERPL-SCNC: 20.7 MMOL/L (ref 22–29)
CREAT SERPL-MCNC: 0.81 MG/DL (ref 0.76–1.27)
CRP SERPL-MCNC: 3.09 MG/DL (ref 0–0.5)
DEPRECATED RDW RBC AUTO: 47 FL (ref 37–54)
EGFRCR SERPLBLD CKD-EPI 2021: 112.9 ML/MIN/1.73
EOSINOPHIL # BLD AUTO: 0.01 10*3/MM3 (ref 0–0.4)
EOSINOPHIL NFR BLD AUTO: 0.1 % (ref 0.3–6.2)
ERYTHROCYTE [DISTWIDTH] IN BLOOD BY AUTOMATED COUNT: 14.2 % (ref 12.3–15.4)
GLUCOSE SERPL-MCNC: 119 MG/DL (ref 65–99)
HCT VFR BLD AUTO: 44.5 % (ref 37.5–51)
HCT VFR BLD AUTO: 47 % (ref 37.5–51)
HGB BLD-MCNC: 14.3 G/DL (ref 13–17.7)
HGB BLD-MCNC: 15 G/DL (ref 13–17.7)
IMM GRANULOCYTES # BLD AUTO: 0.05 10*3/MM3 (ref 0–0.05)
IMM GRANULOCYTES NFR BLD AUTO: 0.4 % (ref 0–0.5)
LYMPHOCYTES # BLD AUTO: 1.81 10*3/MM3 (ref 0.7–3.1)
LYMPHOCYTES NFR BLD AUTO: 14.6 % (ref 19.6–45.3)
MAGNESIUM SERPL-MCNC: 1.7 MG/DL (ref 1.6–2.6)
MCH RBC QN AUTO: 28.8 PG (ref 26.6–33)
MCHC RBC AUTO-ENTMCNC: 31.9 G/DL (ref 31.5–35.7)
MCV RBC AUTO: 90.2 FL (ref 79–97)
MONOCYTES # BLD AUTO: 0.47 10*3/MM3 (ref 0.1–0.9)
MONOCYTES NFR BLD AUTO: 3.8 % (ref 5–12)
NEUTROPHILS NFR BLD AUTO: 10.01 10*3/MM3 (ref 1.7–7)
NEUTROPHILS NFR BLD AUTO: 80.9 % (ref 42.7–76)
NRBC BLD AUTO-RTO: 0 /100 WBC (ref 0–0.2)
PLATELET # BLD AUTO: 322 10*3/MM3 (ref 140–450)
PMV BLD AUTO: 10.3 FL (ref 6–12)
POTASSIUM SERPL-SCNC: 4.2 MMOL/L (ref 3.5–5.2)
RBC # BLD AUTO: 5.21 10*6/MM3 (ref 4.14–5.8)
SODIUM SERPL-SCNC: 139 MMOL/L (ref 136–145)
WBC NRBC COR # BLD AUTO: 12.38 10*3/MM3 (ref 3.4–10.8)

## 2024-11-11 PROCEDURE — 25010000002 HEPARIN (PORCINE) PER 1000 UNITS: Performed by: STUDENT IN AN ORGANIZED HEALTH CARE EDUCATION/TRAINING PROGRAM

## 2024-11-11 PROCEDURE — 99232 SBSQ HOSP IP/OBS MODERATE 35: CPT | Performed by: INTERNAL MEDICINE

## 2024-11-11 PROCEDURE — 25010000002 METHYLPREDNISOLONE PER 125 MG: Performed by: STUDENT IN AN ORGANIZED HEALTH CARE EDUCATION/TRAINING PROGRAM

## 2024-11-11 PROCEDURE — 99222 1ST HOSP IP/OBS MODERATE 55: CPT | Performed by: INTERNAL MEDICINE

## 2024-11-11 PROCEDURE — 83735 ASSAY OF MAGNESIUM: CPT | Performed by: STUDENT IN AN ORGANIZED HEALTH CARE EDUCATION/TRAINING PROGRAM

## 2024-11-11 PROCEDURE — 80048 BASIC METABOLIC PNL TOTAL CA: CPT | Performed by: STUDENT IN AN ORGANIZED HEALTH CARE EDUCATION/TRAINING PROGRAM

## 2024-11-11 PROCEDURE — 86140 C-REACTIVE PROTEIN: CPT | Performed by: STUDENT IN AN ORGANIZED HEALTH CARE EDUCATION/TRAINING PROGRAM

## 2024-11-11 PROCEDURE — 25010000002 METHYLPREDNISOLONE PER 125 MG

## 2024-11-11 PROCEDURE — 85018 HEMOGLOBIN: CPT | Performed by: STUDENT IN AN ORGANIZED HEALTH CARE EDUCATION/TRAINING PROGRAM

## 2024-11-11 PROCEDURE — 85014 HEMATOCRIT: CPT | Performed by: STUDENT IN AN ORGANIZED HEALTH CARE EDUCATION/TRAINING PROGRAM

## 2024-11-11 PROCEDURE — 25810000003 SODIUM CHLORIDE 0.9 % SOLUTION: Performed by: STUDENT IN AN ORGANIZED HEALTH CARE EDUCATION/TRAINING PROGRAM

## 2024-11-11 PROCEDURE — 85025 COMPLETE CBC W/AUTO DIFF WBC: CPT | Performed by: STUDENT IN AN ORGANIZED HEALTH CARE EDUCATION/TRAINING PROGRAM

## 2024-11-11 RX ORDER — PREDNISONE 10 MG/1
10 TABLET ORAL
Status: DISCONTINUED | OUTPATIENT
Start: 2024-11-24 | End: 2024-11-12 | Stop reason: HOSPADM

## 2024-11-11 RX ORDER — PREDNISONE 20 MG/1
40 TABLET ORAL
Status: DISCONTINUED | OUTPATIENT
Start: 2024-11-12 | End: 2024-11-12 | Stop reason: HOSPADM

## 2024-11-11 RX ORDER — PREDNISONE 20 MG/1
20 TABLET ORAL
Status: DISCONTINUED | OUTPATIENT
Start: 2024-11-20 | End: 2024-11-12 | Stop reason: HOSPADM

## 2024-11-11 RX ADMIN — HYDROCORTISONE 2.5%: 25 CREAM TOPICAL at 20:59

## 2024-11-11 RX ADMIN — Medication 10 ML: at 20:59

## 2024-11-11 RX ADMIN — METHYLPREDNISOLONE SODIUM SUCCINATE 60 MG: 125 INJECTION, POWDER, FOR SOLUTION INTRAMUSCULAR; INTRAVENOUS at 21:48

## 2024-11-11 RX ADMIN — HYDROCORTISONE 2.5%: 25 CREAM TOPICAL at 09:56

## 2024-11-11 RX ADMIN — Medication 10 ML: at 09:55

## 2024-11-11 RX ADMIN — LEVOFLOXACIN 750 MG: 500 TABLET, FILM COATED ORAL at 16:48

## 2024-11-11 RX ADMIN — BISACODYL 5 MG: 5 TABLET, COATED ORAL at 13:16

## 2024-11-11 RX ADMIN — METRONIDAZOLE 500 MG: 500 TABLET ORAL at 21:48

## 2024-11-11 RX ADMIN — METRONIDAZOLE 500 MG: 500 TABLET ORAL at 05:33

## 2024-11-11 RX ADMIN — POLYETHYLENE GLYCOL 3350 17 G: 17 POWDER, FOR SOLUTION ORAL at 16:50

## 2024-11-11 RX ADMIN — CETIRIZINE HYDROCHLORIDE 10 MG: 10 TABLET, FILM COATED ORAL at 20:58

## 2024-11-11 RX ADMIN — HEPARIN SODIUM 5000 UNITS: 5000 INJECTION INTRAVENOUS; SUBCUTANEOUS at 09:54

## 2024-11-11 RX ADMIN — METHYLPREDNISOLONE SODIUM SUCCINATE 60 MG: 125 INJECTION, POWDER, FOR SOLUTION INTRAMUSCULAR; INTRAVENOUS at 13:24

## 2024-11-11 RX ADMIN — SODIUM CHLORIDE 100 ML/HR: 9 INJECTION, SOLUTION INTRAVENOUS at 09:53

## 2024-11-11 RX ADMIN — SENNOSIDES AND DOCUSATE SODIUM 2 TABLET: 50; 8.6 TABLET ORAL at 09:55

## 2024-11-11 RX ADMIN — METRONIDAZOLE 500 MG: 500 TABLET ORAL at 00:07

## 2024-11-11 RX ADMIN — Medication 10 ML: at 13:24

## 2024-11-11 RX ADMIN — METRONIDAZOLE 500 MG: 500 TABLET ORAL at 13:16

## 2024-11-11 RX ADMIN — ACETAMINOPHEN 650 MG: 325 TABLET ORAL at 03:00

## 2024-11-11 RX ADMIN — Medication 10 ML: at 13:16

## 2024-11-11 RX ADMIN — HEPARIN SODIUM 5000 UNITS: 5000 INJECTION INTRAVENOUS; SUBCUTANEOUS at 20:58

## 2024-11-11 RX ADMIN — METHYLPREDNISOLONE SODIUM SUCCINATE 60 MG: 125 INJECTION, POWDER, FOR SOLUTION INTRAMUSCULAR; INTRAVENOUS at 05:33

## 2024-11-11 RX ADMIN — SODIUM CHLORIDE 100 ML/HR: 9 INJECTION, SOLUTION INTRAVENOUS at 19:13

## 2024-11-11 NOTE — PLAN OF CARE
Goal Outcome Evaluation:  Plan of Care Reviewed With: patient        Progress: improving  Outcome Evaluation: Pt A&Ox4,VSS. Pt ambulated to restroom and back to bed with supervision. Pt c/o pain x 1 which was managed with prn Tylenol, pt has not c/o nausea since arrival to floor. Pt care plan continued.

## 2024-11-11 NOTE — PROGRESS NOTES
[facility]   Hospitalist Progress Note  Date: 2024  Patient Name: Chris Dahl  : 1980  MRN: [id number]  Date of admission: 11/10/2024      Subjective   Subjective     Chief Complaint: Follow up for abdominal pain, nausea and vomiting    Summary: 43 y/o M with Crohn's disease status post right hemicolectomy who presented with abdominal pain, nausea, nonbloody emesis. Lab workup revealed significant hemoconcentration with elevated hemoglobin 19.7, leukocytosis 21,000, elevated protein of 9 and elevated alkaline phosphatase of 187. CT of the abdomen with contrast showed wall thickening of neoterminal small bowel at the anastomosis as well as active Crohn's enteritis and diffuse small bowel distention proximal to anastomosis compatible with an SBO. There was probable wall thickening of additional small bowel proximal to the neoterminal small bowel compatible with an additional segment of active enteritis.     Interval Followup:   NAEON. VSS  Denies fever or chills  Abdominal pain not as severe or constant  Tolerating ice chips  No vomiting  2 bowel movements reported since admission    Objective   Objective     Vitals:   Temp:  [97.6 °F (36.4 °C)-98.7 °F (37.1 °C)] 97.6 °F (36.4 °C)  Heart Rate:  [] 67  Resp:  [18-20] 18  BP: (127-151)/(77-96) 151/89  Physical Exam    Constitutional: conversant, NAD   Respiratory:  nonlabored respirations    Cardiovascular:  RRR, no edema   Gastrointestinal: soft, nondistended, no r/g/r   Neurologic: Alert, speech clear   Skin: Extremities warm    Result Review    Result Review:  I have personally reviewed the following over the last 24 hours (07:00 to 07:00) and agree with the following findings  [x]  Laboratory  CBC          2024    14:43 11/10/2024    11:48 2024    05:05   CBC   WBC 11.37  21.65  12.38    RBC 5.63  6.69  5.21    Hemoglobin 16.1  19.7  15.0    Hematocrit 47.9  59.9  47.0    MCV 85.1  89.5  90.2    MCH 28.6  29.4  28.8    MCHC  33.6  32.9  31.9    RDW 13.2  15.2  14.2    Platelets 369  406  322      BMP          6/12/2024    14:43 11/10/2024    11:48 11/11/2024    05:05   BMP   BUN 6  7  11    Creatinine 0.76  1.32  0.81    Sodium 140  140  139    Potassium 3.9  4.2  4.2    Chloride 105  101  109    CO2 24.9  23.1  20.7    Calcium 9.0  10.5  7.9      [x]  Microbiology  Blood cultures NGTD  [x]  Radiology   CT Abdomen Pelvis With Contrast    Result Date: 11/10/2024  CT ABDOMEN PELVIS W CONTRAST Date of Exam: 11/10/2024 1:50 PM EST Indication: left sided abd pain, N/V/D. Comparison: 11/19/2015 Technique: Axial CT images were obtained of the abdomen and pelvis after the uneventful intravenous administration of iodinated contrast. Reconstructed coronal and sagittal images were also obtained. Automated exposure control and iterative construction methods were used. Findings: Lower Chest: Dependent atelectasis in the lung bases Organs: Liver, spleen, pancreas, kidneys, adrenal glands, gallbladder unremarkable Gastrointestinal: Status post right hemicolectomy. Wall thickening of the neoterminal small bowel at the enterocolic anastomosis, with dilatation of the entire small bowel. The anastomosis is patent. Suggestion of mild wall thickening of small bowel in the left mid abdomen that is approximately 10 cm proximal to the thickened neoterminal small bowel. No colonic wall thickening or dilatation. Fluid throughout the colon compatible with diarrheal process Pelvis: No abnormal fluid collection. Urinary bladder unremarkable Peritoneum/Retroperitoneum: No ascites or pneumoperitoneum. Normal caliber aorta Bones/Soft Tissues: No acute bony abnormality     There has been right hemicolectomy. There is wall thickening of the neoterminal small bowel at the anastomosis compatible with active Crohn's enteritis. Although the anastomosis is patent, there is diffuse small bowel distention proximal to the anastomosis compatible with small bowel obstruction,  presumably functional. There is probable wall thickening of additional small bowel proximal to the neoterminal small bowel compatible with an additional segment of active enteritis Electronically Signed: Juan Tang  11/10/2024 2:43 PM EST  Workstation ID: [id number]     []  EKG/Telemetry   []  Cardiology/Vascular   []  Pathology  []  Old records  [x]  Other:    Intake/Output Summary (Last 24 hours) at 11/11/2024 1237  Last data filed at 11/11/2024 1003  Gross per 24 hour   Intake 970 ml   Output --   Net 970 ml         Assessment & Plan   Assessment / Plan     Assessment/Plan:  Acute Crohn's flare  Enteritis  Acute kidney injury, likely prerenal secondary to diarrhea  Leukocytosis likely reactive to above  External hemorrhoids  Polycythemia likely secondary hemoconcentration from volume loss   Essential HTN       Symptoms are improving with a combination of IV fluids, IV steroids and antibiotic  No fevers.  White count down to 12.4  Does not appear to be have bowel obstruction clinically  Gastroenterology following, appreciate recommendation  Continue Levaquin 750 mg daily and Flagyl 500 mg every 8 hour  Continue IV Solu-Medrol 60 mg every 8 hours  Trend CRP level  Monitor fever curve, white count  Continue  mL/h  Advance to clear liquid diet  Continue antiemetics as needed  Cr normalized with IV fluids / UOP not recorded /continue to monitor kidney function and electrolytes  Continue hydrocortisone rectal cream 2 times daily   BP stable. Continue to hold losartan  VTE prophylaxis: Subcu heparin  CBC, BMP in a.m.    Discussed plan with RN.    VTE Prophylaxis:  Pharmacologic VTE prophylaxis orders are present.      CODE STATUS:   Code Status (Patient has no pulse and is not breathing): CPR (Attempt to Resuscitate)  Medical Interventions (Patient has pulse or is breathing): Full Support    Electronically signed by Rafiq Best DO, 11/11/24, 12:37 PM EST.

## 2024-11-11 NOTE — SIGNIFICANT NOTE
11/11/24 1145   Coping/Psychosocial   Observed Emotional State calm;cooperative   Verbalized Emotional State hopefulness   Trust Relationship/Rapport empathic listening provided   Family/Support Persons spouse   Involvement in Care interacting with patient   Additional Documentation Spiritual Care (Group)   Spiritual Care   Use of Spiritual Resources non-Synagogue use of spiritual care   Spiritual Care Source  initiative   Spiritual Care Follow-Up follow-up, none required as presently assessed   Response to Spiritual Care receptive of support   Spiritual Care Interventions supportive conversation provided   Spiritual Care Visit Type initial   Receptivity to Spiritual Care visit welcomed

## 2024-11-11 NOTE — CONSULTS
[facility]  Initial Inpatient Consult Note    Referring Provider: Hospitalist    Reason for Consultation: Crohn's flare, abdominal pain, nausea, and vomiting     Subjective     History of present illness:    44 y.o. male with a past medical history of Crohn's disease status post right Heema colectomy who presented to the ED with abdominal pain, nausea, and vomiting.  Patient states his abdominal pain began Saturday night on his left lower quadrant, but when laying on left side he had no pain and was able to sleep through the night.  Patient states when he awoke on Sunday his pain had progressively worsened and he began having nausea and vomiting and diarrhea as well.  Patient describes his discomfort as a cramping, sharp, and stabbing pain.  He states his pain when coming to the ED was about a 7 or 8, currently it is a 0 and earlier today it was about a 5-6 out of 10.  Patient states his pain is better when in certain positions, and worse when moving around.  Patient has not had any more nausea or vomiting.  Patient is also having loose bowel movements states his last bowel movement was this morning early and he also did see moderate to large amount of blood but cannot determine if it is from hemorrhoids or not.  Patient states this is the first time he has been hospitalized in 20 years.  Patient denies hematemesis, NSAID use, and fevers.    Patient's last colonoscopy was performed on 2/22/2024 by Dr. Blackman  Findings then included internal hemorrhoids, scar in the sigmoid colon, patent end-to-side ileocolonic anastomosis, a few ulcers in the neoterminal ileum.    11/10/2024 CT abdomen/pelvis  There has been right hemicolectomy. There is wall thickening of the neoterminal small bowel at the anastomosis compatible with active Crohn's enteritis. Although the anastomosis is patent, there is diffuse small bowel distention proximal to the   anastomosis compatible with small bowel obstruction,  presumably functional. There is probable wall thickening of additional small bowel proximal to the neoterminal small bowel compatible with an additional segment of active enteritis    Patient's last office visit with VENECIA Cr was in August 2024 where they did discuss Biologics-patient would like to discuss further at next follow-up, but is concerned with cost.    Patient has next colonoscopy scheduled in February    Past Medical History:  Past Medical History:   Diagnosis Date    Crohn's disease      Past Surgical History:  Past Surgical History:   Procedure Laterality Date    ABDOMINAL SURGERY      partial colonectomy    APPENDECTOMY      COLONOSCOPY      COLONOSCOPY N/A 2/22/2024    Procedure: COLONOSCOPY WITH BIOPSIES;  Surgeon: Adis Blackman MD;  Location: [facility] ENDOSCOPY;  Service: Gastroenterology;  Laterality: N/A;  TERMINAL ILEUM ULCER, PREVIOUS SURGERY, HEMORRHOIDS      Social History:   Social History     Tobacco Use    Smoking status: Every Day     Current packs/day: 1.50     Average packs/day: 1.5 packs/day for 25.0 years (37.5 ttl pk-yrs)     Types: Cigarettes     Passive exposure: Past    Smokeless tobacco: Never   Substance Use Topics    Alcohol use: Yes     Comment: occ      Family History:  Family History   Problem Relation Age of Onset    Crohn's disease Paternal Aunt     Colon cancer Neg Hx        Home Meds:  Medications Prior to Admission   Medication Sig Dispense Refill Last Dose/Taking    cetirizine (zyrTEC) 10 MG tablet Take 1 tablet by mouth Every Night. Indications: Hayfever 90 tablet 1 Unknown    losartan (Cozaar) 50 MG tablet Take 1 tablet by mouth Daily. Indications: High Blood Pressure 30 tablet 2 Unknown     Current Meds:   cetirizine, 10 mg, Oral, Nightly  heparin (porcine), 5,000 Units, Subcutaneous, Q12H  Hydrocortisone (Perianal), , Rectal, BID  levoFLOXacin, 750 mg, Oral, Q24H  methylPREDNISolone sodium succinate, 60 mg, Intravenous, Q8H  metroNIDAZOLE, 500  mg, Oral, Q8H  sodium chloride, 10 mL, Intravenous, Q12H      Allergies:  No Known Allergies  Review of Systems  Pertinent items are noted in HPI     Objective     Vital Signs  Temp:  [97.6 °F (36.4 °C)-98.7 °F (37.1 °C)] 97.9 °F (36.6 °C)  Heart Rate:  [64-98] 79  Resp:  [18] 18  BP: (127-153)/(77-92) 153/87  Physical Exam:  General Appearance:    Alert, cooperative, in no acute distress   Head:    Normocephalic, without obvious abnormality, atraumatic   Eyes:          conjunctivae and sclerae normal, no icterus   Throat:   no thrush, oral mucosa moist   Neck:   Supple, no adenopathy   Lungs:     Unlabored breathing     Heart:    Regular rhythm and normal rate    Chest Wall:    No abnormalities observed   Abdomen:     Soft, non distended, tender in left LQ   Extremities:   no edema, no redness   Skin:   No bruising or rash   Psychiatric:  normal mood and insight     Results Review:   I reviewed the patient's new clinical results.    Results from last 7 days   Lab Units 11/11/24  0505 11/10/24  1148   WBC 10*3/mm3 12.38* 21.65*   HEMOGLOBIN g/dL 15.0 19.7*   HEMATOCRIT % 47.0 59.9*   PLATELETS 10*3/mm3 322 406     Results from last 7 days   Lab Units 11/11/24  0505 11/10/24  1148   SODIUM mmol/L 139 140   POTASSIUM mmol/L 4.2 4.2   CHLORIDE mmol/L 109* 101   CO2 mmol/L 20.7* 23.1   BUN mg/dL 11 7   CREATININE mg/dL 0.81 1.32*   CALCIUM mg/dL 7.9* 10.5   BILIRUBIN mg/dL  --  0.4   ALK PHOS U/L  --  187*   ALT (SGPT) U/L  --  24   AST (SGOT) U/L  --  17   GLUCOSE mg/dL 119* 149*         Lab Results   Lab Value Date/Time    LIPASE 30 11/10/2024 1148    LIPASE 45 10/14/2020 2220       Radiology:  CT Abdomen Pelvis With Contrast    Result Date: 11/10/2024  There has been right hemicolectomy. There is wall thickening of the neoterminal small bowel at the anastomosis compatible with active Crohn's enteritis. Although the anastomosis is patent, there is diffuse small bowel distention proximal to the anastomosis compatible  with small bowel obstruction, presumably functional. There is probable wall thickening of additional small bowel proximal to the neoterminal small bowel compatible with an additional segment of active enteritis Electronically Signed: Juan Tang  11/10/2024 2:43 PM EST  Workstation ID: [id number]      Assessment & Plan     Partial small bowel obstruction       Assessment:  Abdominal pain  Nausea and vomiting  Diarrhea  Crohn's flare    Plan:  Will advance patient's diet slowly-will start with clear liquid diet  Patient is currently on Solu-Medrol, we will switch to p.o. steroids with taper over 2 weeks  Patient has a follow-up with VENECIA Cr already scheduled on 11/21/2024-patient needs to keep appointment  Patient also has next colonoscopy scheduled for 2/7/2025  Patient understands and agrees to the plan      I discussed the patients findings and my recommendations with patient and family.    Electronically signed by VENECIA Bond, 11/11/24, 12:15 PM EST.  Patient seen, examined and data reviewed.  Patient was admitted yesterday with nausea vomiting abdominal pain found to have partial small bowel OBSTRUCTION secondary to acute exacerbation of Crohn's disease.  Patient was started on IV antibiotics.  He states he is already slowly improving.  Will continue to slowly advance diet.  Most likely patient can be discharged tomorrow with oral steroids and a slow taper and have follow-up in the office.  Risk and benefits have been discussed with the patient.

## 2024-11-11 NOTE — PAYOR COMM NOTE
"Chris Dahl (44 y.o. Male)     PATIENT INFORMATION  Name:  Chris Dahl  MRN#:     [id number]  :  1980         ADMISSION INFORMATION  CLASS: Inpatient   DOS:  11/10/24        CURRENT ATTENDING PROVIDER INFORMATION  Name/NPI: Tio Gallegos MD [[id number]]  Phone:  Phone: [phone]  Fax:  [phone]        REQUESTING PROVIDER and RENDERING FACILITY  Name:  [facility]   NPI:  [id number]  TID:  [id number]  Address:      [address] [place], [place] [address] [address]  Phone:               [phone]  Fax:  [phone]        UTILIZATION REVIEW CONTACT INFORMATION  Phone:      [phone]  Fax:           [phone]        ADMISSION DIAGNOSIS  Colitis [K52.9]  Small bowel obstruction [K56.609]  Partial small bowel obstruction [K56.600]  Crohn's disease of colon with intestinal obstruction [K50.112]    ++++++++++++++++++++++++++++++++++++++++++++++++++++++++++++++++++++++++++++++++        Date of Birth   1980    Social Security Number       Address   [address] [place] [address]    Home Phone   [phone]    MRN   [id number]       Yazidism   None    Marital Status                               Admission Date   11/10/24    Admission Type   Emergency    Admitting Provider   Tio Gallegos MD    Attending Provider   Tio Gallegos MD    Department, Room/Bed   [address], [facility]/       Discharge Date       Discharge Disposition       Discharge Destination                                 Attending Provider: Tio Gallegos MD    Allergies: No Known Allergies    Isolation: None   Infection: None   Code Status: CPR    Ht: 182.9 cm (72\")   Wt: 87.8 kg (193 lb 9 oz)    Admission Cmt: None   Principal Problem: Partial small bowel obstruction [K56.600]                   Active Insurance as of 11/10/2024       Primary Coverage       Payor Plan Insurance Group Employer/Plan Group    [employer] BLUE CROSS AN[employer]M BLUE CROSS BLUE SHIELD PPO [id number]       " Payor Plan Address Payor Plan Phone Number Payor Plan Fax Number Effective Dates    [address] [phone]  5/16/2021 - None Entered    [place] [id number]         Subscriber Name Subscriber Birth Date Member ID       PERI VALDES 1980 [id number]                      Inflammatory Bowel Disease [facility]       Indications Met   Last updated by Vane Swanson, RN on 11/10/2024 2013     Review Status Created By   Primary Completed Vane Swanson, GABO      Criteria Review   Inflammatory Bowel Disease [facility]     Overall Determination: Indications Met     Criteria:  [×] Admission is indicated for  1 or more  of the following :      [×] Signs of intestinal obstruction (eg, vomiting with inability to tolerate oral intake, abdominal pain and distention, imaging consistent with bowel obstruction)          11/10/2024  8:06 PM              -- 11/10/2024  8:06 PM by Vane Swanson, GABO --                  CT abd- shows wall thickening of neoterminal small bowel at the anastomosis as well as active Crohn's enteritis and diffuse small bowel distention proximal to anastomosis compatible with an SBO.     Notes:  -- 11/10/2024  8:13 PM by Vane Swanson, RN --      To ED c/o new onset abd pain, N/V. Cramping began this AM w/multiple bouts of non-bloody emesis w/inability to tolerate PO. Pain worse on L side. AAOx3.                   PMHx: Crohn's disease. Hx of Partial SBO 2/2 Crohn's disease. Partial colectomy approx 20years ago with portion of small bowel removed.                   ED results:       Creatinine 1.32. . Anion gap 15.9.       WBC 12.65. H/H 19.7/59.9. .      CRP 1.66.                   CT abd: There has been right hemicolectomy. There is wall thickening of the neoterminal small bowel at the anastomosis compatible with active Crohn's enteritis. Although the anastomosis is patent, there is diffuse small bowel distention proximal to the anastomosis compatible with small bowel obstruction,  presumably functional. There is probable wall thickening of additional small bowel proximal to the neoterminal small bowel compatible with an additional segment of active enteritis.                   In ED:       Levaquin IV      Flagyl IV      IV NS 2000ml bolus      Zofran IV                  Admit:       Partial small bowel obstruction likely secondary to Crohn's flare      Acute Crohn's flare      Leukocytosis likely reactive to above      External hemorrhoids      Polycythemia likely secondary hemoconcentration from volume loss            Plan      - Admit to hospitalist service      - Continue hydration and antibiotics with Levaquin Flagyl per GI recommendations.      Keep n.p.o. except for ice chips and sips with meds tonight, history suggest this is partial small bowel and not a complete obstruction due to his diarrhea at home.      Supportive care with antiemetics and pain control      - Continue Solu-Medrol 60 every 8 per GI recommendations.      GI has been consulted and further recommendations will be appreciated regarding tapering of steroids      - Continue to trend leukocytosis, fever curve.      - Trend polycythemia for improvement with aggressive hydration.      - Start Anusol cream for external hemorrhoids      - Continue to monitor daily labs including CBC and BMP                  History & Physical        Tio Gallegos MD at 11/10/24 [address] [place], [place] [address]IST HISTORY AND PHYSICAL  Date: 11/10/2024   Patient Name: Chris Dahl  : 1980  MRN: [id number]  Primary Care Physician:  Catherine Roach, VENECIA  Date of admission: 11/10/2024    Subjective  Subjective     Chief Complaint: Abdominal pain, nausea vomiting    HPI:    Chris Dahl is a 44 y.o. male past medical history of Crohn's disease status post right hemicolectomy who presents to the ER due to new onset abdominal pain, nausea vomiting.  Patient states that he was in his normal state of health until a few days  ago when he noticed his external hemorrhoids flaring up.  He was able to tolerate diet without any pain, melena or hematochezia until this morning when he started to have cramping abdominal pain associated with multiple bouts of nonbloody emesis with inability to tolerate p.o. which prompted him to come into the ER    Upon arrival patient was tachycardic but otherwise stable.  Lab workup revealed significant hemoconcentration with elevated hemoglobin 19.7, leukocytosis 21,000, elevated protein of 9 and elevated alkaline phosphatase of 187.  CT of the abdomen with contrast showed wall thickening of neoterminal small bowel at the anastomosis as well as active Crohn's enteritis and diffuse small bowel distention proximal to anastomosis compatible with an SBO.  There was probable wall thickening of additional small bowel proximal to the neoterminal small bowel compatible with an additional segment of active enteritis.  GI was consulted by the ER and advised initiation of steroids and antibiotics as well as fluid hydration.  Hospitalist then contacted for admission.  The time exam patient states his cramping abdominal pain is slightly improved.  He states he has been having some loose stools since this morning.  Denies having any headache.  No chest pain or palpitations.  No melena or hematochezia.  Does complain of painful external hemorrhoids that are flared up recently.      Personal History     Past Medical History:  Past Medical History:   Diagnosis Date    Crohn's disease          Past Surgical History:  Past Surgical History:   Procedure Laterality Date    ABDOMINAL SURGERY      partial colonectomy    APPENDECTOMY      COLONOSCOPY      COLONOSCOPY N/A 2/22/2024    Procedure: COLONOSCOPY WITH BIOPSIES;  Surgeon: Adis Blackman MD;  Location: [facility] ENDOSCOPY;  Service: Gastroenterology;  Laterality: N/A;  TERMINAL ILEUM ULCER, PREVIOUS SURGERY, HEMORRHOIDS         Family History:   Reviewed and  noncontributory except as mentioned in HPI    Social History:   Social Drivers of Health     Tobacco Use: High Risk (11/10/2024)    Patient History     Smoking Tobacco Use: Every Day     Smokeless Tobacco Use: Never     Passive Exposure: Past   Alcohol Use: Not on file   Financial Resource Strain: Not on file   Food Insecurity: Not on file   Transportation Needs: Not on file   Physical Activity: Not on file   Stress: Not on file   Social Connections: Not on file   Interpersonal Safety: Not At Risk (11/10/2024)    Abuse Screen     Unsafe at Home or Work/School: no     Feels Threatened by Someone?: no     Does Anyone Keep You from Contacting Others or Doint Things Outside the Home?: no     Physical Sign of Abuse Present: no   Depression: Not at risk (10/3/2024)    PHQ-2     PHQ-2 Score: 0   Housing Stability: Not on file   Utilities: Not on file   Health Literacy: Not on file   Employment: Not on file   Disabilities: Not At Risk (2/22/2024)    Disabilities     Concentrating, Remembering, or Making Decisions Difficulty: no     Doing Errands Independently Difficulty: no         Home Medications:  cetirizine, ferrous sulfate, and losartan    Allergies:  No Known Allergies    Review of Systems   All systems were reviewed and negative except for: Abdominal pain, nausea, vomiting,     Objective  Objective     Vitals:   Temp:  [97.9 °F (36.6 °C)-98 °F (36.7 °C)] 98 °F (36.7 °C)  Heart Rate:  [] 98  Resp:  [18-20] 18  BP: (127-133)/(77-96) 127/77    Physical Exam    Constitutional: Awake, alert, no acute distress   Eyes: Pupils equal, sclerae anicteric, no conjunctival injection   HENT: NCAT, mucous membranes moist   Neck: Supple, no thyromegaly, no lymphadenopathy, trachea midline   Respiratory: Clear to auscultation bilaterally, nonlabored respirations    Cardiovascular: RRR, no murmurs, rubs, or gallops, palpable pedal pulses bilaterally   Gastrointestinal: Positive bowel sounds, soft, nontender,  nondistended   Musculoskeletal: No bilateral ankle edema, no clubbing or cyanosis to extremities   Psychiatric: Appropriate affect, cooperative   Neurologic: Oriented x 3, strength symmetric in all extremities, Cranial Nerves grossly intact to confrontation, speech clear   Skin: No rashes     Result Review   Result Review:  I have personally reviewed the results from the time of this admission to 11/10/2024 17:58 EST and agree with these findings:  Laboratory  Microbiology  Radiology  EKG/Telemetry   Cardiology/Vascular   Pathology  Old records  Other:      Assessment & Plan  Assessment / Plan     Assessment/Plan:   Partial small bowel obstruction likely secondary to Crohn's flare  Acute Crohn's flare  Leukocytosis likely reactive to above  External hemorrhoids  Polycythemia likely secondary hemoconcentration from volume loss    Plan  - Admit to hospitalist service  - Continue hydration and antibiotics with Levaquin Flagyl per GI recommendations.  Keep n.p.o. except for ice chips and sips with meds tonight, history suggest this is partial small bowel and not a complete obstruction due to his diarrhea at home.  Supportive care with antiemetics and pain control  - Continue Solu-Medrol 60 every 8 per GI recommendations.  GI has been consulted and further recommendations will be appreciated regarding tapering of steroids  - Continue to trend leukocytosis, fever curve.  - Trend polycythemia for improvement with aggressive hydration.  - I have started Anusol cream for external hemorrhoids  - Continue to monitor daily labs including CBC and BMP      Discussed with ER Physician and Nurse    All labs/imaging studies were personally reviewed and findings are as noted above      DVT Prophylaxis: Heparin    CODE STATUS:    Code Status (Patient has no pulse and is not breathing): CPR (Attempt to Resuscitate)  Medical Interventions (Patient has pulse or is breathing): Full Support      Admission Status:  I believe this patient  meets inpatient status.    Electronically signed by Tio Gallegos MD, 11/10/24, 5:58 PM EST.               Electronically signed by Tio Gallegos MD at 11/10/24 1803       Facility-Administered Medications as of 11/10/2024   Medication Dose Route Frequency Provider Last Rate Last Admin    acetaminophen (TYLENOL) tablet 650 mg  650 mg Oral Q6H PRN Tio Gallegos MD        aluminum-magnesium hydroxide-simethicone (MAALOX MAX) 400-400-40 MG/5ML suspension 15 mL  15 mL Oral Q6H PRN Tio Gallegos MD        sennosides-docusate (PERICOLACE) 8.6-50 MG per tablet 2 tablet  2 tablet Oral BID PRN Tio Gallegos MD        And    polyethylene glycol (MIRALAX) packet 17 g  17 g Oral Daily PRN Tio Gallegos MD        And    bisacodyl (DULCOLAX) EC tablet 5 mg  5 mg Oral Daily PRN Tio Gallegos MD        And    bisacodyl (DULCOLAX) suppository 10 mg  10 mg Rectal Daily PRN Tio Gallegos MD        cetirizine (zyrTEC) tablet 10 mg  10 mg Oral Nightly Tio Gallegos MD        heparin (porcine) 5000 UNIT/ML injection 5,000 Units  5,000 Units Subcutaneous Q12H Tio Gallegos MD        Hydrocortisone (Perianal) (ANUSOL-HC) 2.5 % rectal cream   Rectal BID Tio Gallegos MD        [COMPLETED] iopamidol (ISOVUE-370) 76 % injection 100 mL  100 mL Intravenous Once in imaging Miah Pearce DO   80 mL at 11/10/24 1359    levoFLOXacin (LEVAQUIN) 750 mg/150 mL D5W (premix) (LEVAQUIN) 750 mg  750 mg Intravenous Once Tio Gallegos  mL/hr at 11/10/24 1930 750 mg at 11/10/24 1930    [START ON 11/11/2024] levoFLOXacin (LEVAQUIN) tablet 750 mg  750 mg Oral Q24H Tio Gallegos MD        [COMPLETED] methylPREDNISolone sodium succinate (SOLU-Medrol) injection 60 mg  60 mg Intravenous Once Miah Pearce DO   60 mg at 11/10/24 1732    [START ON 11/11/2024] methylPREDNISolone sodium succinate (SOLU-Medrol) injection 60 mg  60 mg Intravenous Q8H Tio Gallegos MD        [COMPLETED] metroNIDAZOLE (FLAGYL) IVPB 500 mg  500 mg Intravenous Once Miah Pearce  mL/hr at 11/10/24  1738 500 mg at 11/10/24 1738    [START ON 11/11/2024] metroNIDAZOLE (FLAGYL) tablet 500 mg  500 mg Oral Q8H Tio Gallegos MD        [COMPLETED] ondansetron (ZOFRAN) injection 4 mg  4 mg Intravenous Once Miah Pearce, DO   4 mg at 11/10/24 1154    prochlorperazine (COMPAZINE) injection 5 mg  5 mg Intravenous Q6H PRN Tio Gallegos MD        [COMPLETED] sodium chloride 0.9 % bolus 2,000 mL  2,000 mL Intravenous Once Miah Pearce, DO 2,000 mL/hr at 11/10/24 1735 2,000 mL at 11/10/24 1735    sodium chloride 0.9 % flush 10 mL  10 mL Intravenous PRN Tio Gallegos MD        sodium chloride 0.9 % flush 10 mL  10 mL Intravenous Q12H Tio Gallegos MD        sodium chloride 0.9 % flush 10 mL  10 mL Intravenous PRN Tio Gallegos MD        sodium chloride 0.9 % infusion 40 mL  40 mL Intravenous PRN Tio Gallegos MD        sodium chloride 0.9 % infusion  100 mL/hr Intravenous Continuous Tio Gallegos MD         Lab Results (last 24 hours)       Procedure Component Value Units Date/Time    Urinalysis With Microscopic If Indicated (No Culture) - Urine, Clean Catch [[id number]]  (Abnormal) Collected: 11/10/24 1755    Specimen: Urine, Clean Catch Updated: 11/10/24 1810     Color, UA Yellow     Appearance, UA Clear     pH, UA 5.5     Specific Gravity, UA >1.030     Glucose, UA Negative     Ketones, UA Negative     Bilirubin, UA Negative     Blood, UA Negative     Protein, UA 30 mg/dL (1+)     Leuk Esterase, UA Negative     Nitrite, UA Negative     Urobilinogen, UA 1.0 E.U./dL    Urinalysis, Microscopic Only - Urine, Clean Catch [[id number]]  (Abnormal) Collected: 11/10/24 1755    Specimen: Urine, Clean Catch Updated: 11/10/24 1810     RBC, UA 0-2 /HPF      WBC, UA 3-5 /HPF      Bacteria, UA None Seen /HPF      Squamous Epithelial Cells, UA 0-2 /HPF      Hyaline Casts, UA 0-2 /LPF      Methodology Automated Microscopy    Blood Culture - Blood, Arm, Right [[id number]] Collected: 11/10/24 3976    Specimen: Blood from Arm, Right Updated: 11/10/24 2117     Lactic Acid, Plasma [[id number]]  (Normal) Collected: 11/10/24 1706    Specimen: Blood Updated: 11/10/24 1726     Lactate 1.0 mmol/L     C-reactive Protein [[id number]]  (Abnormal) Collected: 11/10/24 1148    Specimen: Blood Updated: 11/10/24 1719     C-Reactive Protein 1.66 mg/dL     Blood Culture - Blood, Arm, Left [[id number]] Collected: 11/10/24 1706    Specimen: Blood from Arm, Left Updated: 11/10/24 1709    Comprehensive Metabolic Panel [[id number]]  (Abnormal) Collected: 11/10/24 1148    Specimen: Blood Updated: 11/10/24 1211     Glucose 149 mg/dL      BUN 7 mg/dL      Creatinine 1.32 mg/dL      Sodium 140 mmol/L      Potassium 4.2 mmol/L      Chloride 101 mmol/L      CO2 23.1 mmol/L      Calcium 10.5 mg/dL      Total Protein 9.0 g/dL      Albumin 4.8 g/dL      ALT (SGPT) 24 U/L      AST (SGOT) 17 U/L      Alkaline Phosphatase 187 U/L      Total Bilirubin 0.4 mg/dL      Globulin 4.2 gm/dL      A/G Ratio 1.1 g/dL      BUN/Creatinine Ratio 5.3     Anion Gap 15.9 mmol/L      eGFR 69.1 mL/min/1.73     Narrative:      GFR Normal >60  Chronic Kidney Disease <60  Kidney Failure <15      Lipase [[id number]]  (Normal) Collected: 11/10/24 1148    Specimen: Blood Updated: 11/10/24 1211     Lipase 30 U/L     Freedom Draw [[id number]] Collected: 11/10/24 1148    Specimen: Blood Updated: 11/10/24 1200    Narrative:      The following orders were created for panel order Freedom Draw.  Procedure                               Abnormality         Status                     ---------                               -----------         ------                     Green Top (Gel)[[id number]]                                  Final result               Lavender Top[[id number]]                                     Final result               Gold Top - SST[[id number]]                                   Final result               Light Blue Top[[id number]]                                   Final result                 Please view results for  these tests on the individual orders.    Green Top (Gel) [[id number]] Collected: 11/10/24 1148    Specimen: Blood Updated: 11/10/24 1200     Extra Tube Hold for add-ons.     Comment: Auto resulted.       Gold Top - SST [[id number]] Collected: 11/10/24 1148    Specimen: Blood Updated: 11/10/24 1200     Extra Tube Hold for add-ons.     Comment: Auto resulted.       Light Blue Top [[id number]] Collected: 11/10/24 1148    Specimen: Blood Updated: 11/10/24 1200     Extra Tube Hold for add-ons.     Comment: Auto resulted       Lavender Top [[id number]] Collected: 11/10/24 1148    Specimen: Blood Updated: 11/10/24 1159     Extra Tube hold for add-on     Comment: Auto resulted       CBC & Differential [[id number]]  (Abnormal) Collected: 11/10/24 1148    Specimen: Blood Updated: 11/10/24 1155    Narrative:      The following orders were created for panel order CBC & Differential.  Procedure                               Abnormality         Status                     ---------                               -----------         ------                     CBC Auto Differential[[id number]]        Abnormal            Final result                 Please view results for these tests on the individual orders.    CBC Auto Differential [[id number]]  (Abnormal) Collected: 11/10/24 1148    Specimen: Blood Updated: 11/10/24 1155     WBC 21.65 10*3/mm3      RBC 6.69 10*6/mm3      Hemoglobin 19.7 g/dL      Hematocrit 59.9 %      MCV 89.5 fL      MCH 29.4 pg      MCHC 32.9 g/dL      RDW 15.2 %      RDW-SD 47.3 fl      MPV 9.6 fL      Platelets 406 10*3/mm3      Neutrophil % 78.1 %      Lymphocyte % 13.0 %      Monocyte % 7.1 %      Eosinophil % 0.9 %      Basophil % 0.4 %      Immature Grans % 0.5 %      Neutrophils, Absolute 16.93 10*3/mm3      Lymphocytes, Absolute 2.81 10*3/mm3      Monocytes, Absolute 1.53 10*3/mm3      Eosinophils, Absolute 0.19 10*3/mm3      Basophils, Absolute 0.09 10*3/mm3      Immature Grans, Absolute 0.10 10*3/mm3       nRBC 0.0 /100 WBC           Imaging Results (Last 24 Hours)       Procedure Component Value Units Date/Time    CT Abdomen Pelvis With Contrast [[id number]] Collected: 11/10/24 1435     Updated: 11/10/24 1445    Narrative:      CT ABDOMEN PELVIS W CONTRAST    Date of Exam: 11/10/2024 1:50 PM EST    Indication: left sided abd pain, N/V/D.    Comparison: 11/19/2015    Technique: Axial CT images were obtained of the abdomen and pelvis after the uneventful intravenous administration of iodinated contrast. Reconstructed coronal and sagittal images were also obtained. Automated exposure control and iterative construction   methods were used.      Findings:    Lower Chest: Dependent atelectasis in the lung bases    Organs: Liver, spleen, pancreas, kidneys, adrenal glands, gallbladder unremarkable    Gastrointestinal: Status post right hemicolectomy. Wall thickening of the neoterminal small bowel at the enterocolic anastomosis, with dilatation of the entire small bowel. The anastomosis is patent. Suggestion of mild wall thickening of small bowel in   the left mid abdomen that is approximately 10 cm proximal to the thickened neoterminal small bowel. No colonic wall thickening or dilatation. Fluid throughout the colon compatible with diarrheal process    Pelvis: No abnormal fluid collection. Urinary bladder unremarkable    Peritoneum/Retroperitoneum: No ascites or pneumoperitoneum. Normal caliber aorta    Bones/Soft Tissues: No acute bony abnormality      Impression:      There has been right hemicolectomy. There is wall thickening of the neoterminal small bowel at the anastomosis compatible with active Crohn's enteritis. Although the anastomosis is patent, there is diffuse small bowel distention proximal to the   anastomosis compatible with small bowel obstruction, presumably functional. There is probable wall thickening of additional small bowel proximal to the neoterminal small bowel compatible with an additional segment of  active enteritis              Electronically Signed: Juan Tang    11/10/2024 2:43 PM EST    Workstation ID: [id number]          Orders (last 24 hrs)        Start     Ordered    11/11/24 1730  levoFLOXacin (LEVAQUIN) tablet 750 mg  Every 24 Hours         11/10/24 2014    11/11/24 0800  Oral Care  2 Times Daily       11/10/24 2014    11/11/24 0600  Basic Metabolic Panel  Daily       11/10/24 2014    11/11/24 0600  CBC & Differential  Daily       11/10/24 2014    11/11/24 0600  Magnesium  Daily       11/10/24 2014    11/11/24 0600  methylPREDNISolone sodium succinate (SOLU-Medrol) injection 60 mg  Every 8 Hours         11/10/24 2014    11/11/24 0600  C-reactive Protein  Daily       11/10/24 2014    11/11/24 0000  Vital Signs  Every 4 Hours       11/10/24 2014    11/11/24 0000  metroNIDAZOLE (FLAGYL) tablet 500 mg  Every 8 Hours Scheduled         11/10/24 2014    11/10/24 2100  cetirizine (zyrTEC) tablet 10 mg  Nightly         11/10/24 2014    11/10/24 2100  sodium chloride 0.9 % flush 10 mL  Every 12 Hours Scheduled         11/10/24 2014    11/10/24 2100  heparin (porcine) 5000 UNIT/ML injection 5,000 Units  Every 12 Hours Scheduled         11/10/24 2014    11/10/24 2100  sodium chloride 0.9 % infusion  Continuous         11/10/24 2014    11/10/24 2100  Hydrocortisone (Perianal) (ANUSOL-HC) 2.5 % rectal cream  2 Times Daily         11/10/24 2014    11/10/24 2014  Intake & Output  Every Shift       11/10/24 2014    11/10/24 2014  Weigh Patient  Once         11/10/24 2014    11/10/24 2014  Insert Peripheral IV  Once         11/10/24 2014    11/10/24 2014  Saline Lock & Maintain IV Access  Continuous,   Status:  Canceled         11/10/24 2014    11/10/24 2014  Remote Cardiac Monitor  Continuous         11/10/24 2014    11/10/24 2014  Maintain IV Access  Continuous         11/10/24 2014    11/10/24 2014  NPO Diet NPO Type: Ice Chips, Sips with Meds  Diet Effective Now         11/10/24 2014    11/10/24 2013  sodium  "chloride 0.9 % flush 10 mL  As Needed         11/10/24 2014    11/10/24 2013  sodium chloride 0.9 % infusion 40 mL  As Needed         11/10/24 2014    11/10/24 2013  aluminum-magnesium hydroxide-simethicone (MAALOX MAX) 400-400-40 MG/5ML suspension 15 mL  Every 6 Hours PRN         11/10/24 2014    11/10/24 2013  sennosides-docusate (PERICOLACE) 8.6-50 MG per tablet 2 tablet  2 Times Daily PRN        Placed in \"And\" Linked Group    11/10/24 2014    11/10/24 2013  polyethylene glycol (MIRALAX) packet 17 g  Daily PRN        Placed in \"And\" Linked Group    11/10/24 2014    11/10/24 2013  bisacodyl (DULCOLAX) EC tablet 5 mg  Daily PRN        Placed in \"And\" Linked Group    11/10/24 2014    11/10/24 2013  bisacodyl (DULCOLAX) suppository 10 mg  Daily PRN        Placed in \"And\" Linked Group    11/10/24 2014    11/10/24 2013  acetaminophen (TYLENOL) tablet 650 mg  Every 6 Hours PRN         11/10/24 2014    11/10/24 2013  prochlorperazine (COMPAZINE) injection 5 mg  Every 6 Hours PRN         11/10/24 2014    11/10/24 1809  Urinalysis, Microscopic Only - Urine, Clean Catch  Once         11/10/24 1808    11/10/24 1734  Inpatient Admission  Once         11/10/24 1738    11/10/24 1734  Code Status and Medical Interventions: CPR (Attempt to Resuscitate); Full Support  Continuous         11/10/24 1738    11/10/24 1730  levoFLOXacin (LEVAQUIN) 750 mg/150 mL D5W (premix) (LEVAQUIN) 750 mg  Once         11/10/24 1701    11/10/24 1730  metroNIDAZOLE (FLAGYL) IVPB 500 mg  Once         11/10/24 1701    11/10/24 1715  sodium chloride 0.9 % bolus 2,000 mL  Once         11/10/24 1658    11/10/24 1700  methylPREDNISolone sodium succinate (SOLU-Medrol) injection 60 mg  Once         11/10/24 1658    11/10/24 1658  Lactic Acid, Plasma  Once         11/10/24 1658    11/10/24 1658  Blood Culture - Blood, Arm, Right  Once        Placed in \"And\" Linked Group    11/10/24 1658    11/10/24 1658  Blood Culture - Blood, Arm, Left  Once        Placed in " "\"And\" Linked Group    11/10/24 1658    11/10/24 1653  C-reactive Protein  Once         11/10/24 1652    11/10/24 1640  Hospitalist (on-call MD unless specified)  Once        Specialty:  Hospitalist  Provider:  Tio Gallegos MD    11/10/24 1639    11/10/24 1640  Gastroenterology (on-call MD unless specified)  Once        Specialty:  Gastroenterology  Provider:  Adis Blackman MD    11/10/24 1639    11/10/24 1415  iopamidol (ISOVUE-370) 76 % injection 100 mL  Once in Imaging         11/10/24 1359    11/10/24 1311  CT Abdomen Pelvis With Contrast  1 Time Imaging        Comments: IV CONTRAST ONLY      11/10/24 1312    11/10/24 1215  ondansetron (ZOFRAN) injection 4 mg  Once         11/10/24 1151    11/10/24 1143  NPO Diet NPO Type: Strict NPO  Diet Effective Now,   Status:  Canceled         11/10/24 1142    11/10/24 1143  Undress & Gown  Once         11/10/24 1142    11/10/24 1143  Insert Peripheral IV  Once         11/10/24 1142    11/10/24 1143  [place] Draw  Once         11/10/24 1142    11/10/24 1143  CBC & Differential  Once         11/10/24 1142    11/10/24 1143  Comprehensive Metabolic Panel  Once         11/10/24 1142    11/10/24 1143  Lipase  Once         11/10/24 1142    11/10/24 1143  Urinalysis With Microscopic If Indicated (No Culture) - Urine, Clean Catch  Once         11/10/24 1142    11/10/24 1143  Green Top (Gel)  PROCEDURE ONCE         11/10/24 1142    11/10/24 1143  Lavender Top  PROCEDURE ONCE         11/10/24 1142    11/10/24 1143  Gold Top - SST  PROCEDURE ONCE         11/10/24 1142    11/10/24 1143  Light Blue Top  PROCEDURE ONCE         11/10/24 1142    11/10/24 1143  CBC Auto Differential  PROCEDURE ONCE         11/10/24 1142    11/10/24 1142  sodium chloride 0.9 % flush 10 mL  As Needed         11/10/24 1142                  "

## 2024-11-12 ENCOUNTER — READMISSION MANAGEMENT (OUTPATIENT)
Dept: CALL CENTER | Facility: HOSPITAL | Age: 44
End: 2024-11-12
Payer: COMMERCIAL

## 2024-11-12 ENCOUNTER — TELEPHONE (OUTPATIENT)
Dept: GASTROENTEROLOGY | Facility: CLINIC | Age: 44
End: 2024-11-12
Payer: COMMERCIAL

## 2024-11-12 VITALS
HEIGHT: 72 IN | BODY MASS INDEX: 26.61 KG/M2 | TEMPERATURE: 97.3 F | HEART RATE: 101 BPM | WEIGHT: 196.43 LBS | SYSTOLIC BLOOD PRESSURE: 159 MMHG | OXYGEN SATURATION: 95 % | DIASTOLIC BLOOD PRESSURE: 90 MMHG | RESPIRATION RATE: 16 BRPM

## 2024-11-12 LAB
ANION GAP SERPL CALCULATED.3IONS-SCNC: 8.2 MMOL/L (ref 5–15)
BASOPHILS # BLD AUTO: 0.03 10*3/MM3 (ref 0–0.2)
BASOPHILS NFR BLD AUTO: 0.2 % (ref 0–1.5)
BUN SERPL-MCNC: 8 MG/DL (ref 6–20)
BUN/CREAT SERPL: 12.7 (ref 7–25)
CALCIUM SPEC-SCNC: 8.3 MG/DL (ref 8.6–10.5)
CHLORIDE SERPL-SCNC: 109 MMOL/L (ref 98–107)
CO2 SERPL-SCNC: 21.8 MMOL/L (ref 22–29)
CREAT SERPL-MCNC: 0.63 MG/DL (ref 0.76–1.27)
CRP SERPL-MCNC: 1.02 MG/DL (ref 0–0.5)
DEPRECATED RDW RBC AUTO: 47.2 FL (ref 37–54)
EGFRCR SERPLBLD CKD-EPI 2021: 121.8 ML/MIN/1.73
EOSINOPHIL # BLD AUTO: 0 10*3/MM3 (ref 0–0.4)
EOSINOPHIL NFR BLD AUTO: 0 % (ref 0.3–6.2)
ERYTHROCYTE [DISTWIDTH] IN BLOOD BY AUTOMATED COUNT: 14.2 % (ref 12.3–15.4)
GLUCOSE SERPL-MCNC: 124 MG/DL (ref 65–99)
HCT VFR BLD AUTO: 43.1 % (ref 37.5–51)
HGB BLD-MCNC: 13.9 G/DL (ref 13–17.7)
IMM GRANULOCYTES # BLD AUTO: 0.16 10*3/MM3 (ref 0–0.05)
IMM GRANULOCYTES NFR BLD AUTO: 1 % (ref 0–0.5)
LYMPHOCYTES # BLD AUTO: 1.88 10*3/MM3 (ref 0.7–3.1)
LYMPHOCYTES NFR BLD AUTO: 11.4 % (ref 19.6–45.3)
MAGNESIUM SERPL-MCNC: 1.8 MG/DL (ref 1.6–2.6)
MCH RBC QN AUTO: 29.3 PG (ref 26.6–33)
MCHC RBC AUTO-ENTMCNC: 32.3 G/DL (ref 31.5–35.7)
MCV RBC AUTO: 90.7 FL (ref 79–97)
MONOCYTES # BLD AUTO: 0.58 10*3/MM3 (ref 0.1–0.9)
MONOCYTES NFR BLD AUTO: 3.5 % (ref 5–12)
NEUTROPHILS NFR BLD AUTO: 13.81 10*3/MM3 (ref 1.7–7)
NEUTROPHILS NFR BLD AUTO: 83.9 % (ref 42.7–76)
NRBC BLD AUTO-RTO: 0 /100 WBC (ref 0–0.2)
PLATELET # BLD AUTO: 306 10*3/MM3 (ref 140–450)
PMV BLD AUTO: 10.2 FL (ref 6–12)
POTASSIUM SERPL-SCNC: 3.8 MMOL/L (ref 3.5–5.2)
RBC # BLD AUTO: 4.75 10*6/MM3 (ref 4.14–5.8)
SODIUM SERPL-SCNC: 139 MMOL/L (ref 136–145)
WBC NRBC COR # BLD AUTO: 16.46 10*3/MM3 (ref 3.4–10.8)

## 2024-11-12 PROCEDURE — 25010000002 METHYLPREDNISOLONE PER 125 MG

## 2024-11-12 PROCEDURE — 25810000003 SODIUM CHLORIDE 0.9 % SOLUTION: Performed by: STUDENT IN AN ORGANIZED HEALTH CARE EDUCATION/TRAINING PROGRAM

## 2024-11-12 PROCEDURE — 25010000002 HEPARIN (PORCINE) PER 1000 UNITS: Performed by: STUDENT IN AN ORGANIZED HEALTH CARE EDUCATION/TRAINING PROGRAM

## 2024-11-12 PROCEDURE — 83735 ASSAY OF MAGNESIUM: CPT | Performed by: STUDENT IN AN ORGANIZED HEALTH CARE EDUCATION/TRAINING PROGRAM

## 2024-11-12 PROCEDURE — 80048 BASIC METABOLIC PNL TOTAL CA: CPT | Performed by: STUDENT IN AN ORGANIZED HEALTH CARE EDUCATION/TRAINING PROGRAM

## 2024-11-12 PROCEDURE — 86140 C-REACTIVE PROTEIN: CPT | Performed by: STUDENT IN AN ORGANIZED HEALTH CARE EDUCATION/TRAINING PROGRAM

## 2024-11-12 PROCEDURE — 85025 COMPLETE CBC W/AUTO DIFF WBC: CPT | Performed by: STUDENT IN AN ORGANIZED HEALTH CARE EDUCATION/TRAINING PROGRAM

## 2024-11-12 PROCEDURE — 63710000001 PREDNISONE PER 1 MG

## 2024-11-12 PROCEDURE — 99239 HOSP IP/OBS DSCHRG MGMT >30: CPT | Performed by: INTERNAL MEDICINE

## 2024-11-12 RX ORDER — METRONIDAZOLE 500 MG/1
500 TABLET ORAL 3 TIMES DAILY
Qty: 12 TABLET | Refills: 0 | Status: CANCELLED | OUTPATIENT
Start: 2024-11-12 | End: 2024-11-16

## 2024-11-12 RX ORDER — PREDNISONE 10 MG/1
30 TABLET ORAL
Qty: 12 TABLET | Refills: 0 | Status: SHIPPED | OUTPATIENT
Start: 2024-11-16 | End: 2024-11-21

## 2024-11-12 RX ORDER — LOSARTAN POTASSIUM 50 MG/1
50 TABLET ORAL ONCE
Status: COMPLETED | OUTPATIENT
Start: 2024-11-12 | End: 2024-11-12

## 2024-11-12 RX ORDER — PREDNISONE 20 MG/1
40 TABLET ORAL
Qty: 6 TABLET | Refills: 0 | Status: SHIPPED | OUTPATIENT
Start: 2024-11-13 | End: 2024-11-16

## 2024-11-12 RX ORDER — PREDNISONE 20 MG/1
20 TABLET ORAL
Qty: 4 TABLET | Refills: 0 | Status: SHIPPED | OUTPATIENT
Start: 2024-11-20 | End: 2024-11-24

## 2024-11-12 RX ORDER — METRONIDAZOLE 500 MG/1
500 TABLET ORAL EVERY 8 HOURS SCHEDULED
Qty: 9 TABLET | Refills: 0 | Status: SHIPPED | OUTPATIENT
Start: 2024-11-12 | End: 2024-11-15

## 2024-11-12 RX ORDER — LEVOFLOXACIN 750 MG/1
750 TABLET, FILM COATED ORAL EVERY 24 HOURS
Qty: 3 TABLET | Refills: 0 | Status: SHIPPED | OUTPATIENT
Start: 2024-11-12 | End: 2024-11-15

## 2024-11-12 RX ORDER — LEVOFLOXACIN 750 MG/1
750 TABLET, FILM COATED ORAL EVERY 24 HOURS
Qty: 4 TABLET | Refills: 0 | Status: CANCELLED | OUTPATIENT
Start: 2024-11-12 | End: 2024-11-16

## 2024-11-12 RX ORDER — ONDANSETRON 4 MG/1
4 TABLET, ORALLY DISINTEGRATING ORAL EVERY 8 HOURS PRN
Qty: 21 TABLET | Refills: 0 | Status: SHIPPED | OUTPATIENT
Start: 2024-11-12

## 2024-11-12 RX ORDER — PREDNISONE 10 MG/1
10 TABLET ORAL
Qty: 4 TABLET | Refills: 0 | Status: SHIPPED | OUTPATIENT
Start: 2024-11-24 | End: 2024-11-28

## 2024-11-12 RX ADMIN — HEPARIN SODIUM 5000 UNITS: 5000 INJECTION INTRAVENOUS; SUBCUTANEOUS at 09:47

## 2024-11-12 RX ADMIN — PREDNISONE 40 MG: 20 TABLET ORAL at 09:47

## 2024-11-12 RX ADMIN — LOSARTAN POTASSIUM 50 MG: 50 TABLET, FILM COATED ORAL at 09:57

## 2024-11-12 RX ADMIN — METRONIDAZOLE 500 MG: 500 TABLET ORAL at 05:08

## 2024-11-12 RX ADMIN — HYDROCORTISONE 2.5%: 25 CREAM TOPICAL at 09:53

## 2024-11-12 RX ADMIN — SODIUM CHLORIDE 100 ML/HR: 9 INJECTION, SOLUTION INTRAVENOUS at 05:08

## 2024-11-12 RX ADMIN — METHYLPREDNISOLONE SODIUM SUCCINATE 60 MG: 125 INJECTION, POWDER, FOR SOLUTION INTRAMUSCULAR; INTRAVENOUS at 05:08

## 2024-11-12 NOTE — PLAN OF CARE
Goal Outcome Evaluation:              Outcome Evaluation: AAO. Room air.  Tolerating Clear liquid diet.

## 2024-11-12 NOTE — PLAN OF CARE
Goal Outcome Evaluation:    AAO X4. TOLERATING CLEAR LIQUID DIET, ROOM AIR, & ACTIVITY AD ANDREA.

## 2024-11-12 NOTE — OUTREACH NOTE
Prep Survey      Flowsheet Row Responses   [facility] patient discharged from? [facility]   Is LACE score < 7 ? Yes   Eligibility [facility]   Date of Admission 11/10/24   Date of Discharge 11/12/24   Discharge Disposition Home or Self Care   Discharge diagnosis Partial small bowel obstruction   Does the patient have one of the following disease processes/diagnoses(primary or secondary)? Other   Does the patient have Home health ordered? No   Is there a DME ordered? No   Medication alerts for this patient see AVS   Prep survey completed? Yes            Lisbeth MONREAL - Registered Nurse

## 2024-11-12 NOTE — TELEPHONE ENCOUNTER
Patient is currently inpatient with partial small bowel obstruction due to Crohn's flare  Patient was started on Solu-Medrol IV and has since been placed on prednisone taper and planning for discharge  Patient is established with VENECIA Cr and needs follow-up appointment in office upon discharge

## 2024-11-12 NOTE — DISCHARGE SUMMARY
[facility]         HOSPITALIST  DISCHARGE SUMMARY    Patient Name: Chris Dahl  : 1980  MRN: [id number]    Date of Admission: 11/10/2024  Date of Discharge:  24  Primary Care Physician: Catherine Roach APRN    Consults       Date and Time Order Name Status Description    11/10/2024  4:39 PM Gastroenterology (on-call MD unless specified)      11/10/2024  4:39 PM Hospitalist (on-call MD unless specified)              Active and Resolved Hospital Problems:  Acute Crohn's flare  Enteritis  Partial small bowel obstruction  Acute kidney injury, likely prerenal secondary to diarrhea  Leukocytosis  External hemorrhoids  Polycythemia likely secondary hemoconcentration from volume loss   Essential hypertension    Hospital Course     Hospital Course:  Chris Dahl is a 44 y.o. male with history of Crohn's disease, right hemicolectomy, external hemorrhoids who presented with left lower quadrant abdominal pain, nausea and vomiting.  Present tachycardic otherwise vital signs stable.  WBC 21,000. CT of the abdomen with contrast showed wall thickening of neoterminal small bowel at the anastomosis as well as active Crohn's enteritis and diffuse small bowel distention proximal to anastomosis compatible with an SBO. There was probable wall thickening of additional small bowel proximal to the neoterminal small bowel compatible with an additional segment of active enteritis.  Lipase 30.  CRP 1.66.  Gastroenterology consulted.  Placed on IV steroids, IV fluids, empiric antibiotics.  White count downtrended.  Blood cultures no growth.  Remained afebrile.  Presenting symptoms improved.  Diet advanced and had bowel movement.  Cleared from GI perspective.  Will complete 5-day course of antibiotics and will continue prednisone taper with 2-week follow-up planned.    DISCHARGE Follow Up Recommendations for labs and diagnostics:   colonoscopy scheduled for 2025     Day of Discharge     Vital  Signs:  Temp:  [97.3 °F (36.3 °C)-98.3 °F (36.8 °C)] 97.3 °F (36.3 °C)  Heart Rate:  [] 101  Resp:  [16-18] 16  BP: (145-174)/() 159/90  Physical Exam:   GENERAL: conversant and nontoxic.  HEART: RRR. No edema  LUNGS: nonlabored  ABDOMEN: Soft, nd, no r/g/r  NEUROLOGIC: Alert, CN intact    Discharge Details        Discharge Medications        New Medications        Instructions Start Date   levoFLOXacin 750 MG tablet  Commonly known as: LEVAQUIN   750 mg, Oral, Every 24 Hours      metroNIDAZOLE 500 MG tablet  Commonly known as: FLAGYL   500 mg, Oral, Every 8 Hours Scheduled      ondansetron ODT 4 MG disintegrating tablet  Commonly known as: ZOFRAN-ODT   4 mg, Translingual, Every 8 Hours PRN      predniSONE 20 MG tablet  Commonly known as: DELTASONE   40 mg, Oral, Daily With Breakfast   Start Date: November 13, 2024     predniSONE 10 MG tablet  Commonly known as: DELTASONE   30 mg, Oral, Daily With Breakfast   Start Date: November 16, 2024     predniSONE 20 MG tablet  Commonly known as: DELTASONE   20 mg, Oral, Daily With Breakfast   Start Date: November 20, 2024     predniSONE 10 MG tablet  Commonly known as: DELTASONE   10 mg, Oral, Daily With Breakfast   Start Date: November 24, 2024            Continue These Medications        Instructions Start Date   cetirizine 10 MG tablet  Commonly known as: zyrTEC   10 mg, Oral, Nightly      losartan 50 MG tablet  Commonly known as: Cozaar   50 mg, Oral, Daily               No Known Allergies    Discharge Disposition:  Home or Self Care    Diet:  Hospital:  Diet Order   Procedures    Diet: Regular/House; Fluid Consistency: Thin (IDDSI 0)       Discharge Activity:   Activity Instructions       Activity as Tolerated              CODE STATUS:  Code Status and Medical Interventions: CPR (Attempt to Resuscitate); Full Support   Ordered at: 11/10/24 9573     Code Status (Patient has no pulse and is not breathing):    CPR (Attempt to Resuscitate)     Medical  Interventions (Patient has pulse or is breathing):    Full Support         Future Appointments   Date Time Provider Department Center   11/21/2024  2:00 PM Kari Phipps APRN [facility] GE ETWR [facility]   1/3/2025  8:00 AM LIZA BOB  1  LIZA ETWUS [facility]   2/7/2025  2:00 PM Catherine Roach APRN [facility] S [facility] LIZA       Additional Instructions for the Follow-ups that You Need to Schedule       Discharge Follow-up with Specified Provider: Kari Phipps APRN   As directed      To: Kari Phipps APRN   Follow Up Details: on 11/21/2024                Pertinent  and/or Most Recent Results     PROCEDURES:   NONE    LAB RESULTS:      Lab 11/12/24  0544 11/11/24  1916 11/11/24  0505 11/10/24  1706 11/10/24  1148   WBC 16.46*  --  12.38*  --  21.65*   HEMOGLOBIN 13.9 14.3 15.0  --  19.7*   HEMATOCRIT 43.1 44.5 47.0  --  59.9*   PLATELETS 306  --  322  --  406   NEUTROS ABS 13.81*  --  10.01*  --  16.93*   IMMATURE GRANS (ABS) 0.16*  --  0.05  --  0.10*   LYMPHS ABS 1.88  --  1.81  --  2.81   MONOS ABS 0.58  --  0.47  --  1.53*   EOS ABS 0.00  --  0.01  --  0.19   MCV 90.7  --  90.2  --  89.5   CRP 1.02*  --  3.09*  --  1.66*   LACTATE  --   --   --  1.0  --          Lab 11/12/24  0544 11/11/24  0505 11/10/24  1148   SODIUM 139 139 140   POTASSIUM 3.8 4.2 4.2   CHLORIDE 109* 109* 101   CO2 21.8* 20.7* 23.1   ANION GAP 8.2 9.3 15.9*   BUN 8 11 7   CREATININE 0.63* 0.81 1.32*   EGFR 121.8 112.9 69.1   GLUCOSE 124* 119* 149*   CALCIUM 8.3* 7.9* 10.5   MAGNESIUM 1.8 1.7  --          Lab 11/10/24  1148   TOTAL PROTEIN 9.0*   ALBUMIN 4.8   GLOBULIN 4.2   ALT (SGPT) 24   AST (SGOT) 17   BILIRUBIN 0.4   ALK PHOS 187*   LIPASE 30                     Brief Urine Lab Results  (Last result in the past 365 days)        Color   Clarity   Blood   Leuk Est   Nitrite   Protein   CREAT   Urine HCG        11/10/24 1755 Yellow   Clear   Negative   Negative   Negative   30 mg/dL (1+)                 Microbiology Results  (last 10 days)       Procedure Component Value - Date/Time    Blood Culture - Blood, Arm, Right [[id number]]  (Normal) Collected: 11/10/24 1730    Lab Status: Preliminary result Specimen: Blood from Arm, Right Updated: 11/11/24 1800     Blood Culture No growth at 24 hours    Narrative:      Less than seven (7) mL's of blood was collected.  Insufficient quantity may yield false negative results.    Blood Culture - Blood, Arm, Left [[id number]]  (Normal) Collected: 11/10/24 1706    Lab Status: Preliminary result Specimen: Blood from Arm, Left Updated: 11/11/24 1716     Blood Culture No growth at 24 hours    Narrative:      Less than seven (7) mL's of blood was collected.  Insufficient quantity may yield false negative results.    Chlamydia trachomatis, Neisseria gonorrhoeae, PCR - , Urine, Random Void [[id number]]  (Normal) Collected: 11/07/24 1504    Lab Status: Final result Specimen: Urine, Random Void Updated: 11/07/24 2003     Chlamydia DNA by PCR Not Detected     Neisseria gonorrhoeae by PCR Not Detected            CT Abdomen Pelvis With Contrast    Result Date: 11/10/2024  There has been right hemicolectomy. There is wall thickening of the neoterminal small bowel at the anastomosis compatible with active Crohn's enteritis. Although the anastomosis is patent, there is diffuse small bowel distention proximal to the anastomosis compatible with small bowel obstruction, presumably functional. There is probable wall thickening of additional small bowel proximal to the neoterminal small bowel compatible with an additional segment of active enteritis Electronically Signed: Juan Tang  11/10/2024 2:43 PM EST  Workstation ID: [id number]                  Labs Pending at Discharge:  Pending Labs       Order Current Status    Blood Culture - Blood, Arm, Left Preliminary result    Blood Culture - Blood, Arm, Right Preliminary result              Time spent on Discharge including face to face service:  >30  minutes    Electronically signed by Rafiq Best DO, 11/12/24, 12:42 PM EST.

## 2024-11-12 NOTE — PLAN OF CARE
Goal Outcome Evaluation:         Discharging home.     Cait Dougherty RN

## 2024-11-13 ENCOUNTER — TRANSITIONAL CARE MANAGEMENT TELEPHONE ENCOUNTER (OUTPATIENT)
Dept: CALL CENTER | Facility: HOSPITAL | Age: 44
End: 2024-11-13
Payer: COMMERCIAL

## 2024-11-13 NOTE — OUTREACH NOTE
Call Center TCM Note      Flowsheet Row Responses   [facility] patient discharged from? [facility]   Does the patient have one of the following disease processes/diagnoses(primary or secondary)? Other   TCM attempt successful? Yes  [VR for Lizy, wife]   Call start time 1441   Call end time 1455   Discharge diagnosis Partial small bowel obstruction   Meds reviewed with patient/caregiver? Yes   Does the patient have all medications ordered at discharge? No   What is keeping the patient from filling the prescriptions? --  [pt did not receive full prescription of steroids ordered at discharge,  reports he received last two taper dosing of 20mg and 10mg but not the 40mg and 30 mg (only received a total of 8 prednisone).]   Nursing Interventions --  [message to CM.  Pt has taken what he has to take dose of 40mg prescribed today but will need rest of meds]   Notified Case Management Script issues   Comments f/u 11/26/24@945am   Does the patient have an appointment with their PCP within 7-14 days of discharge? Yes   Has home health visited the patient within 72 hours of discharge? N/A   Psychosocial issues? No   Did the patient receive a copy of their discharge instructions? Yes   Nursing interventions Reviewed instructions with patient   What is the patient's perception of their health status since discharge? Improving  [Pt denies pain but reports he woke up with some soreness. had some bleeding this am but feels as if r/t hemorrhoid--he will continue to monitor.  PCP f/u tomorrow.]   Is the patient/caregiver able to teach back signs and symptoms related to disease process for when to call PCP? Yes   Is the patient/caregiver able to teach back signs and symptoms related to disease process for when to call 911? Yes   TCM call completed? Yes   Wrap up additional comments Pt would like a work excuse   Call end time [phone]            Mercy Calvin RN    11/13/2024, 14:59 EST

## 2024-11-14 ENCOUNTER — TELEMEDICINE (OUTPATIENT)
Dept: FAMILY MEDICINE CLINIC | Facility: CLINIC | Age: 44
End: 2024-11-14
Payer: COMMERCIAL

## 2024-11-14 DIAGNOSIS — Z09 HOSPITAL DISCHARGE FOLLOW-UP: Primary | ICD-10-CM

## 2024-11-14 DIAGNOSIS — K50.919 CROHN'S DISEASE WITH COMPLICATION, UNSPECIFIED GASTROINTESTINAL TRACT LOCATION: ICD-10-CM

## 2024-11-14 NOTE — PROGRESS NOTES
Transitional Care Follow Up Visit  Subjective     Chris Dahl is a 44 y.o. male who presents for a transitional care management visit.    Within 48 business hours after discharge our office contacted him via telephone to coordinate his care and needs.      I reviewed and discussed the details of that call along with the discharge summary, hospital problems, inpatient lab results, inpatient diagnostic studies, and consultation reports with Chris.     Current outpatient and discharge medications have been reconciled for the patient.  Reviewed by: VENECIA Hsieh          11/12/2024     4:25 PM   Date of TCM Phone Call   [facility]   Date of Admission 11/10/2024   Date of Discharge 11/12/2024   Discharge Disposition Home or Self Care     Risk for Readmission (LACE) Score: 5 (11/12/2024  6:00 AM)      History of Present Illness   Course During Hospital Stay:    He is feeling better.  He states he did get all of his steroids prescribed and that he has a prescription waiting for him at the pharmacy.  He is scheduled to follow-up with gastroenterology in 1 week.  He has a colonoscopy scheduled in February.  He states he needs a note to return to work.     Active and Resolved Hospital Problems:  Acute Crohn's flare  Enteritis  Partial small bowel obstruction  Acute kidney injury, likely prerenal secondary to diarrhea  Leukocytosis  External hemorrhoids  Polycythemia likely secondary hemoconcentration from volume loss   Essential hypertension      Hospital Course:  Chrsi Dahl is a 44 y.o. male with history of Crohn's disease, right hemicolectomy, external hemorrhoids who presented with left lower quadrant abdominal pain, nausea and vomiting.  Present tachycardic otherwise vital signs stable.  WBC 21,000. CT of the abdomen with contrast showed wall thickening of neoterminal small bowel at the anastomosis as well as active Crohn's enteritis and diffuse small bowel distention proximal to  anastomosis compatible with an SBO. There was probable wall thickening of additional small bowel proximal to the neoterminal small bowel compatible with an additional segment of active enteritis.  Lipase 30.  CRP 1.66.  Gastroenterology consulted.  Placed on IV steroids, IV fluids, empiric antibiotics.  White count downtrended.  Blood cultures no growth.  Remained afebrile.  Presenting symptoms improved.  Diet advanced and had bowel movement.  Cleared from GI perspective.  Will complete 5-day course of antibiotics and will continue prednisone taper with 2-week follow-up planned.     DISCHARGE Follow Up Recommendations for labs and diagnostics:   colonoscopy scheduled for 2/7/2025       Discharge Medications          New Medications         Instructions Start Date   levoFLOXacin 750 MG tablet  Commonly known as: LEVAQUIN    750 mg, Oral, Every 24 Hours        metroNIDAZOLE 500 MG tablet  Commonly known as: FLAGYL    500 mg, Oral, Every 8 Hours Scheduled        ondansetron ODT 4 MG disintegrating tablet  Commonly known as: ZOFRAN-ODT    4 mg, Translingual, Every 8 Hours PRN        predniSONE 20 MG tablet  Commonly known as: DELTASONE    40 mg, Oral, Daily With Breakfast    Start Date: November 13, 2024      predniSONE 10 MG tablet  Commonly known as: DELTASONE    30 mg, Oral, Daily With Breakfast    Start Date: November 16, 2024      predniSONE 20 MG tablet  Commonly known as: DELTASONE    20 mg, Oral, Daily With Breakfast    Start Date: November 20, 2024      predniSONE 10 MG tablet  Commonly known as: DELTASONE    10 mg, Oral, Daily With Breakfast    Start Date: November 24, 2024                Continue These Medications         Instructions Start Date   cetirizine 10 MG tablet  Commonly known as: zyrTEC    10 mg, Oral, Nightly        losartan 50 MG tablet  Commonly known as: Cozaar    50 mg, Oral, Daily           The following portions of the patient's history were reviewed and updated as appropriate: allergies,  current medications, past family history, past medical history, past social history, past surgical history, and problem list.    Review of Systems    Objective   There were no vitals taken for this visit.  Physical Exam  Nursing note reviewed.   Constitutional:       Appearance: Normal appearance.   Pulmonary:      Effort: Pulmonary effort is normal.   Neurological:      General: No focal deficit present.      Mental Status: He is alert and oriented to person, place, and time.   Psychiatric:         Mood and Affect: Mood normal.         Behavior: Behavior normal.         Assessment & Plan   Problems Addressed this Visit          Gastrointestinal Abdominal     Crohn's disease     Other Visit Diagnoses       Hospital discharge follow-up    -  Primary          Diagnoses         Codes Comments    Hospital discharge follow-up    -  Primary ICD-10-CM: Z09  ICD-9-CM: V67.59     Crohn's disease with complication, unspecified gastrointestinal tract location     ICD-10-CM: K50.919  ICD-9-CM: 555.9             Mode of Visit: Video  Location of patient: home  Location of provider: [facility]  You have chosen to receive care through a telehealth visit.  Does the patient consent to use a video/audio connection for your medical care today? Yes  The visit included audio and video interaction. No technical issues occurred during this visit.   Patient understanding that this is a telehealth visit.  I cannot perform a full physical exam, therefore something might be missed, or patient may need to come into the office for further evaluation.  Patient is understanding and consents to this type of visit.       Parts of this note are electronic transcriptions/translations of spoken language to printed text using the Dragon Dictation system.    Catherine Roach, APRN    11/14/2024

## 2024-11-14 NOTE — LETTER
November 14, 2024     Patient: Chris Dahl   YOB: 1980   Date of Visit: 11/14/2024       To Whom It May Concern:    It is my medical opinion that Chris Dahl may return to work on 11/18/2024.            Sincerely,        VENECIA Hsieh    CC: No Recipients

## 2024-11-15 LAB
BACTERIA SPEC AEROBE CULT: NORMAL
BACTERIA SPEC AEROBE CULT: NORMAL

## 2024-11-21 ENCOUNTER — PATIENT ROUNDING (BHMG ONLY) (OUTPATIENT)
Dept: FAMILY MEDICINE CLINIC | Facility: CLINIC | Age: 44
End: 2024-11-21
Payer: COMMERCIAL

## 2024-11-21 ENCOUNTER — OFFICE VISIT (OUTPATIENT)
Dept: GASTROENTEROLOGY | Facility: CLINIC | Age: 44
End: 2024-11-21
Payer: COMMERCIAL

## 2024-11-21 VITALS
BODY MASS INDEX: 28.04 KG/M2 | WEIGHT: 207 LBS | HEART RATE: 82 BPM | DIASTOLIC BLOOD PRESSURE: 93 MMHG | HEIGHT: 72 IN | SYSTOLIC BLOOD PRESSURE: 143 MMHG

## 2024-11-21 DIAGNOSIS — K56.609 SMALL BOWEL OBSTRUCTION: ICD-10-CM

## 2024-11-21 DIAGNOSIS — K50.10 CROHN'S DISEASE OF LARGE INTESTINE WITHOUT COMPLICATION: Primary | ICD-10-CM

## 2024-11-21 DIAGNOSIS — R12 HEARTBURN: ICD-10-CM

## 2024-11-21 RX ORDER — PREDNISONE 10 MG/1
TABLET ORAL
Qty: 3 TABLET | Refills: 0 | Status: SHIPPED | OUTPATIENT
Start: 2024-11-21

## 2024-11-21 RX ORDER — PANTOPRAZOLE SODIUM 40 MG/1
40 TABLET, DELAYED RELEASE ORAL DAILY
Qty: 90 TABLET | Refills: 1 | Status: SHIPPED | OUTPATIENT
Start: 2024-11-21

## 2024-11-21 RX ORDER — FERROUS SULFATE 325(65) MG
325 TABLET ORAL
COMMUNITY

## 2024-11-21 NOTE — PROGRESS NOTES
Patient Name: Chris Dahl   Visit Date: 11/21/2024   Patient ID: [id number]  Provider: VENECIA Aceves    Sex: male  Location:  Location Address:  Location Phone: [address]  [place] [address] [phone]    YOB: 1980  Age: 44 y.o.   Primary Care Provider Catherine Roach APRN      Referring Provider: No ref. provider found        Chief Complaint  Crohn's Disease (Follow up ), Abdominal Pain (Lower ABD pain some days ), Diarrhea (Pt had 3 loose/liquid stools yesterday ), and Heartburn (Pt is having after meals and at bedtime )    History of Present Illness  Initially established care in 2013. History of Crohn's diagnosis 2004. He had a colon resection about 1 year after diagnosis in [place], 2005. He previously managed Crohn's with Pentasa. Biologic naive.  Colonoscopy 3/13/2013 by Dr. Blackman - evidence of right hemicolectomy, small hyperplastic polyp in rectum otherwise normal mucosa. Biopsies normal of terminal ileum, right colon, left colon, and rectum.   EGD/Colonoscopy 9/10/2015 by Dr. Blackman - LA grade D esophagitis with large ulcers in distal esophagus and hiatal hernia. Esophageal biopsies - active esophagitis with ulceration. Normal duodenum biopsies. Normal mucosa throughout colon with internal hemorrhoids and post surgical changes in right colon. Normal biopsies of right colon, left colon, and rectum.   EGD 1/07/2016 by Dr. Blackman - normal esophagus, hiatal hernia, normal stomach and duodenum. Esophageal biopsies - chronic inflammation.     6/15/23: Reported diagnosed with uveitis, following with ophthalmology     Colonoscopy 2/22/2024: Good prep, internal hemorrhoids, grade 1, 6 mm healed ulcer noted in the sigmoid colon with biopsy taken-biopsy negative, patent ileocolonic anastomosis noted in the proximal transverse colon, neoterminal ileum contained a few 5 mm ulcers-biopsy with active inflammation, no granulomas no dysplasia, no bleeding was present    Elev  alk phos 141 in June - GGT normal, AMA negative 6/18/24  Liver ultrasound 7/12/2024: No acute process, adherent stone versus small polyp in the gallbladder, liver appears normal     Patient was last seen 8/15/2024, reported being on prednisone drops and oral by ophthalmology for uveitis.  Labs and colonoscopy were planned for February    Patient presented 11/10/2024 to ER with left lower quadrant pain nausea and vomiting.  White count 21,000, CT of the abdomen with contrast showed wall thickening of the neoterminal small bowel at the anastomosis compatible with active Crohn's enteritis, diffuse small bowel distention compatible with a small bowel obstruction proximal to the anastomosis, probable wall thickening of additional small bowel proximal to the neoterminal small bowel compatible with an additional segment of active enteritis.  Patient was given IV steroids fluids and antibiotics    Pt states he's no longer vomiting, he's had nausea, feels a little lightheaded - states not comfortable driving and hasn't returned to work yet.   He took 40 mg Prednisone x 4 days, then 30 mg x 4 days, now on 20 mg x 2 days.  Pt states he started having diarrhea yesterday - 3 x (had been more formed) no BM today.   Pt is eating regularly   Pt states Protonix worked great but HB returned without it    Past Medical History:   Diagnosis Date    Crohn's disease        Past Surgical History:   Procedure Laterality Date    ABDOMINAL SURGERY      partial colonectomy    APPENDECTOMY      COLONOSCOPY      COLONOSCOPY N/A 2/22/2024    Procedure: COLONOSCOPY WITH BIOPSIES;  Surgeon: Adis Blackman MD;  Location: [facility] ENDOSCOPY;  Service: Gastroenterology;  Laterality: N/A;  TERMINAL ILEUM ULCER, PREVIOUS SURGERY, HEMORRHOIDS       No Known Allergies    Family History   Problem Relation Age of Onset    Crohn's disease Paternal Aunt     Colon cancer Neg Hx         Social History     Tobacco Use    Smoking status: Every Day     Current  "packs/day: 1.50     Average packs/day: 1.5 packs/day for 25.0 years (37.5 ttl pk-yrs)     Types: Cigarettes     Passive exposure: Past    Smokeless tobacco: Never   Vaping Use    Vaping status: Never Used   Substance Use Topics    Alcohol use: Yes     Comment: occ    Drug use: Never       Objective     Vital Signs:   /93 (BP Location: Right arm, Patient Position: Sitting, Cuff Size: Adult)   Pulse 82   Ht 182.9 cm (72\")   Wt 93.9 kg (207 lb)   BMI 28.07 kg/m²       Physical Exam  Constitutional:       General: The patient is not in acute distress.     Appearance: Normal appearance.   HENT:      Head: Normocephalic and atraumatic.      Nose: Nose normal.   Pulmonary:      Effort: Pulmonary effort is normal. No respiratory distress.   Abdominal:      General: Abdomen is flat.      Palpations: Abdomen is soft. There is no mass.      Tenderness: There is no abdominal tenderness. There is no guarding.   Musculoskeletal:      Cervical back: Neck supple.      Right lower leg: No edema.      Left lower leg: No edema.   Skin:     General: Skin is warm and dry.   Neurological:      General: No focal deficit present.      Mental Status: The patient is alert and oriented to person, place, and time.      Gait: Gait normal.   Psychiatric:         Mood and Affect: Mood normal.         Speech: Speech normal.         Behavior: Behavior normal.         Thought Content: Thought content normal.     Result Review :   The following data was reviewed by: VENECIA Aceves on 11/21/2024:    CBC w/diff          11/10/2024    11:48 11/11/2024    05:05 11/11/2024    19:16 11/12/2024    05:44   CBC w/Diff   WBC 21.65  12.38   16.46    RBC 6.69  5.21   4.75    Hemoglobin 19.7  15.0  14.3  13.9    Hematocrit 59.9  47.0  44.5  43.1    MCV 89.5  90.2   90.7    MCH 29.4  28.8   29.3    MCHC 32.9  31.9   32.3    RDW 15.2  14.2   14.2    Platelets 406  322   306    Neutrophil Rel % 78.1  80.9   83.9    Immature Granulocyte Rel % " 0.5  0.4   1.0    Lymphocyte Rel % 13.0  14.6   11.4    Monocyte Rel % 7.1  3.8   3.5    Eosinophil Rel % 0.9  0.1   0.0    Basophil Rel % 0.4  0.2   0.2      CMP          11/10/2024    11:48 11/11/2024    05:05 11/12/2024    05:44   CMP   Glucose 149  119  124    BUN 7  11  8    Creatinine 1.32  0.81  0.63    EGFR 69.1  112.9  121.8    Sodium 140  139  139    Potassium 4.2  4.2  3.8    Chloride 101  109  109    Calcium 10.5  7.9  8.3    Total Protein 9.0      Albumin 4.8      Globulin 4.2      Total Bilirubin 0.4      Alkaline Phosphatase 187      AST (SGOT) 17      ALT (SGPT) 24      Albumin/Globulin Ratio 1.1      BUN/Creatinine Ratio 5.3  13.6  12.7    Anion Gap 15.9  9.3  8.2                    Assessment and Plan    Diagnoses and all orders for this visit:    1. Crohn's disease of large intestine without complication (Primary)  -     Hepatitis B Core Antibody, Total; Future  -     QuantiFERON TB Gold; Future  -     Histoplasma Ag Ur - , Urine, Clean Catch; Future  -     Hepatitis B Surface Antigen; Future  -     Hepatitis B Surface Antibody; Future    2. Small bowel obstruction  Comments:  recent    3. Heartburn  -     Case Request; Standing  -     Case Request    Other orders  -     predniSONE (DELTASONE) 10 MG tablet; Take 2 x 1 day then 1 x day  Dispense: 3 tablet; Refill: 0  -     Follow Anesthesia Guidelines / Protocol; Standing  -     Follow Anesthesia Guidelines / Protocol; Future  -     Verify NPO; Standing  -     pantoprazole (Protonix) 40 MG EC tablet; Take 1 tablet by mouth Daily.  Dispense: 90 tablet; Refill: 1              Follow Up   Return for follow up after procedure.  Pre-biologic labs  We discussed Amairani and he was agreeable if needed. Will r/w Dr Blackman once he returns  Add EGD to colon in Feb - restart protonix 40 mg/d  Low residue diet  Work note - pt under our care, seen today, return to work on Wednesday 11/27 (pt seeing PCP on Tuesday)  R/w pt vitals are normal, last labs normal,  lightheadedness / dizziness possible SE of Prednisone? Tapering. Pt has been coming down from 40 mg-- will do 20 mg x 5 days then 10 mg/d x 5 days ( pt is 3 pills short for this, will send in) also encouraged pt to check BP at home and follow up with PCP on this.   We discussed reasons to return to ER.       Patient was given instructions and counseling regarding his condition or for health maintenance advice. Please see specific information pulled into the AVS if appropriate.

## 2024-11-26 ENCOUNTER — OFFICE VISIT (OUTPATIENT)
Dept: FAMILY MEDICINE CLINIC | Facility: CLINIC | Age: 44
End: 2024-11-26
Payer: COMMERCIAL

## 2024-11-26 ENCOUNTER — TELEPHONE (OUTPATIENT)
Dept: GASTROENTEROLOGY | Facility: CLINIC | Age: 44
End: 2024-11-26
Payer: COMMERCIAL

## 2024-11-26 VITALS
TEMPERATURE: 97.2 F | BODY MASS INDEX: 27.13 KG/M2 | HEART RATE: 90 BPM | SYSTOLIC BLOOD PRESSURE: 152 MMHG | OXYGEN SATURATION: 96 % | WEIGHT: 200.3 LBS | HEIGHT: 72 IN | DIASTOLIC BLOOD PRESSURE: 93 MMHG

## 2024-11-26 DIAGNOSIS — R42 LIGHTHEADEDNESS: ICD-10-CM

## 2024-11-26 DIAGNOSIS — I10 PRIMARY HYPERTENSION: Primary | ICD-10-CM

## 2024-11-26 PROCEDURE — 99213 OFFICE O/P EST LOW 20 MIN: CPT | Performed by: NURSE PRACTITIONER

## 2024-11-26 RX ORDER — LOSARTAN POTASSIUM 50 MG/1
50 TABLET ORAL 2 TIMES DAILY
Qty: 60 TABLET | Refills: 2 | Status: SHIPPED | OUTPATIENT
Start: 2024-11-26

## 2024-11-26 NOTE — LETTER
November 26, 2024     Patient: Chris Dahl   YOB: 1980   Date of Visit: 11/26/2024       To Whom It May Concern:    It is my medical opinion that Chris Dahl may return to work on 11/30/2024.          Sincerely,        VENECIA Hsieh    CC: No Recipients

## 2024-11-26 NOTE — TELEPHONE ENCOUNTER
Please notify patient I have reviewed with Dr. Blackman and he agrees patient needs to start on Biologics w recent SBO.  I had reviewed with patient starting Skyrizi and the patient was agreeable, Dr. Blackman is also agreeable.  Please begin this process.  Patient had labs in June I did reorder them if patient could go ahead and repeat so they are more current but please start process.    I reviewed with patient the risk and benefits of Skyrizi at the last visit to include:  Reviewed with patient the risk of hypersensitivity, serious infections, however there were more noted in the placebo group than in the treated group.  There were no malignancies in trial to date; there was one case of acute hepatitis (out of 1471 patient's).  Common side effects are URI, headache, and arthralgia.  I discussed with patient the risk of not treating disease is thought to carry more risk than treating.

## 2024-11-26 NOTE — PROGRESS NOTES
"Chief Complaint  Hospital Follow Up Visit and Dizziness    Subjective            Chris Dahl is a 44 y.o. male who presents to [facility]   History of Present Illness  He is still having dizziness since his hospital stay.  He did see VENECIA Cr on 11/21/2024 for follow-up.  He states he still been having some lightheadedness.  His blood pressure is noted to be elevated in the office today.  He is currently on losartan 50 mg daily.  He states his home blood pressures have been high also and he has 2 readings written down 164/111 and 157/99.    He also needs some paperwork completed for short-term disability while he has been off from work since he was hospitalized for colitis, small bowel obstruction and Crohn's disease.  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      Tobacco Use: High Risk (11/26/2024)    Patient History     Smoking Tobacco Use: Every Day     Smokeless Tobacco Use: Never     Passive Exposure: Past      E-cigarette/Vaping    E-cigarette/Vaping Use Never User      E-cigarette/Vaping Substances    Nicotine No     THC No     CBD No     Flavoring No      E-cigarette/Vaping Devices    Disposable No     Pre-filled or Refillable Cartridge No     Refillable Tank No     Pre-filled Pod No        Alcohol Use: Not At Risk (11/10/2024)    AUDIT-C     Frequency of Alcohol Consumption: Monthly or less     Average Number of Drinks: 1 or 2     Frequency of Binge Drinking: Never         Objective   Vital Signs:   Vitals:    11/26/24 0959   BP: 152/93   Pulse: 90   Temp: 97.2 °F (36.2 °C)   SpO2: 96%   Weight: 90.9 kg (200 lb 4.8 oz)   Height: 182.9 cm (72\")     Body mass index is 27.17 kg/m².    Wt Readings from Last 3 Encounters:   11/26/24 90.9 kg (200 lb 4.8 oz)   11/21/24 93.9 kg (207 lb)   11/10/24 89.1 kg (196 lb 6.9 oz)     BP Readings from Last 3 Encounters:   11/26/24 152/93   11/21/24 " "143/93   11/12/24 159/90       Health Maintenance   Topic Date Due    COVID-19 Vaccine (1 - 2024-25 season) 12/23/2024 (Originally 9/1/2024)    INFLUENZA VACCINE  03/31/2025 (Originally 7/1/2024)    Pneumococcal Vaccine 0-64 (1 of 2 - PCV) 11/07/2025 (Originally 3/13/1988)    BMI FOLLOWUP  10/03/2025    ANNUAL PHYSICAL  11/07/2025    TDAP/TD VACCINES (2 - Td or Tdap) 03/02/2033    HEPATITIS C SCREENING  Completed       /93   Pulse 90   Temp 97.2 °F (36.2 °C)   Ht 182.9 cm (72\")   Wt 90.9 kg (200 lb 4.8 oz)   SpO2 96%   BMI 27.17 kg/m²       Current Outpatient Medications:     cetirizine (zyrTEC) 10 MG tablet, Take 1 tablet by mouth Every Night. Indications: Hayfever, Disp: 90 tablet, Rfl: 1    ferrous sulfate 325 (65 FE) MG tablet, Take 1 tablet by mouth Daily With Breakfast., Disp: , Rfl:     losartan (Cozaar) 50 MG tablet, Take 1 tablet by mouth 2 (Two) Times a Day. Indications: High Blood Pressure, Disp: 60 tablet, Rfl: 2    ondansetron ODT (ZOFRAN-ODT) 4 MG disintegrating tablet, Place 1 tablet on the tongue Every 8 (Eight) Hours As Needed for Nausea or Vomiting., Disp: 21 tablet, Rfl: 0    pantoprazole (Protonix) 40 MG EC tablet, Take 1 tablet by mouth Daily., Disp: 90 tablet, Rfl: 1    predniSONE (DELTASONE) 10 MG tablet, Take 2 x 1 day then 1 x day, Disp: 3 tablet, Rfl: 0   Past Medical History:   Diagnosis Date    Crohn's disease         Physical Exam  Vitals reviewed.   Constitutional:       Appearance: Normal appearance. He is well-developed.   Neck:      Thyroid: No thyroid mass, thyromegaly or thyroid tenderness.   Cardiovascular:      Rate and Rhythm: Normal rate and regular rhythm.      Heart sounds: No murmur heard.     No friction rub. No gallop.   Pulmonary:      Effort: Pulmonary effort is normal.      Breath sounds: Normal breath sounds. No wheezing or rhonchi.   Lymphadenopathy:      Cervical: No cervical adenopathy.   Skin:     General: Skin is warm and dry.   Neurological:      " Mental Status: He is alert and oriented to person, place, and time.      Cranial Nerves: No cranial nerve deficit.   Psychiatric:         Mood and Affect: Mood and affect normal.         Behavior: Behavior normal.         Thought Content: Thought content normal. Thought content does not include homicidal or suicidal ideation.         Judgment: Judgment normal.          Result Review :    The following data was reviewed by: VENECIA Hsieh on 11/26/2024:  Common Labs   Common labs          11/10/2024    11:48 11/11/2024    05:05 11/11/2024    19:16 11/12/2024    05:44   Common Labs   Glucose 149  119   124    BUN 7  11   8    Creatinine 1.32  0.81   0.63    Sodium 140  139   139    Potassium 4.2  4.2   3.8    Chloride 101  109   109    Calcium 10.5  7.9   8.3    Albumin 4.8       Total Bilirubin 0.4       Alkaline Phosphatase 187       AST (SGOT) 17       ALT (SGPT) 24       WBC 21.65  12.38   16.46    Hemoglobin 19.7  15.0  14.3  13.9    Hematocrit 59.9  47.0  44.5  43.1    Platelets 406  322   306             CT Abdomen Pelvis With Contrast    Result Date: 11/10/2024  There has been right hemicolectomy. There is wall thickening of the neoterminal small bowel at the anastomosis compatible with active Crohn's enteritis. Although the anastomosis is patent, there is diffuse small bowel distention proximal to the anastomosis compatible with small bowel obstruction, presumably functional. There is probable wall thickening of additional small bowel proximal to the neoterminal small bowel compatible with an additional segment of active enteritis Electronically Signed: Juan Tang  11/10/2024 2:43 PM EST  Workstation ID: [id number]    Assessment & Plan  Primary hypertension  Hypertension is borderline  Medication changes per orders.  Ambulatory blood pressure monitoring.  I will increase losartan 50 mg dose to twice daily.  Blood pressure will be reassessedin 3 months.    Orders:    losartan (Cozaar) 50 MG tablet;  Take 1 tablet by mouth 2 (Two) Times a Day. Indications: High Blood Pressure    Lightheadedness  We discussed that his lightheadedness is most likely due to his blood pressure being uncontrolled.  He will take off the rest of this week and plan to go back to work on Saturday if he is feeling better.  Advised him that if he is not feeling better that he can call me on Monday and I will extend his work note.                     Diagnosis Plan   1. Primary hypertension  losartan (Cozaar) 50 MG tablet      2. Lightheadedness              FOLLOW UP  Return for Next scheduled follow up.  Patient was given instructions and counseling regarding his condition or for health maintenance advice. Please see specific information pulled into the AVS if appropriate.       CURRENT & DISCONTINUED MEDICATIONS  Current Outpatient Medications   Medication Instructions    cetirizine (ZYRTEC) 10 mg, Oral, Nightly    ferrous sulfate 325 mg, Daily With Breakfast    losartan (COZAAR) 50 mg, Oral, 2 Times Daily    ondansetron ODT (ZOFRAN-ODT) 4 mg, Translingual, Every 8 Hours PRN    pantoprazole (PROTONIX) 40 mg, Oral, Daily    predniSONE (DELTASONE) 10 MG tablet Take 2 x 1 day then 1 x day       Medications Discontinued During This Encounter   Medication Reason    predniSONE (DELTASONE) 10 MG tablet *Therapy completed    losartan (Cozaar) 50 MG tablet Reorder        Parts of this note are electronic transcriptions/translations of spoken language to printed text using the Dragon Dictation system.    Catherine Roach, APRN  11/26/24  14:43 EST

## 2024-12-03 ENCOUNTER — TELEPHONE (OUTPATIENT)
Dept: GASTROENTEROLOGY | Facility: CLINIC | Age: 44
End: 2024-12-03
Payer: COMMERCIAL

## 2024-12-09 DIAGNOSIS — K56.609 SMALL BOWEL OBSTRUCTION: ICD-10-CM

## 2024-12-09 DIAGNOSIS — K50.819 CROHN'S DISEASE OF SMALL AND LARGE INTESTINES WITH COMPLICATION: ICD-10-CM

## 2024-12-09 DIAGNOSIS — K50.10 CROHN'S DISEASE OF LARGE INTESTINE WITHOUT COMPLICATION: Primary | ICD-10-CM

## 2024-12-09 RX ORDER — DIPHENHYDRAMINE HYDROCHLORIDE 50 MG/ML
50 INJECTION INTRAMUSCULAR; INTRAVENOUS ONCE
OUTPATIENT
Start: 2024-12-16 | End: 2024-12-16

## 2024-12-20 ENCOUNTER — HOSPITAL ENCOUNTER (OUTPATIENT)
Dept: INFUSION THERAPY | Facility: HOSPITAL | Age: 44
Discharge: HOME | End: 2024-12-20
Payer: COMMERCIAL

## 2024-12-20 VITALS
HEIGHT: 72 IN | RESPIRATION RATE: 20 BRPM | BODY MASS INDEX: 27.38 KG/M2 | TEMPERATURE: 97.8 F | HEART RATE: 85 BPM | DIASTOLIC BLOOD PRESSURE: 94 MMHG | OXYGEN SATURATION: 97 % | SYSTOLIC BLOOD PRESSURE: 140 MMHG | WEIGHT: 202.16 LBS

## 2024-12-20 DIAGNOSIS — K50.819 CROHN'S DISEASE OF SMALL AND LARGE INTESTINES WITH COMPLICATION: Primary | ICD-10-CM

## 2024-12-20 LAB
ALBUMIN SERPL-MCNC: 3 G/DL (ref 3.5–5.2)
ALBUMIN/GLOB SERPL: 1.4 G/DL
ALP SERPL-CCNC: 89 U/L (ref 39–117)
ALT SERPL W P-5'-P-CCNC: 9 U/L (ref 1–41)
ANION GAP SERPL CALCULATED.3IONS-SCNC: 8.4 MMOL/L (ref 5–15)
AST SERPL-CCNC: 9 U/L (ref 1–40)
BILIRUB SERPL-MCNC: 0.2 MG/DL (ref 0–1.2)
BUN SERPL-MCNC: 3 MG/DL (ref 6–20)
BUN/CREAT SERPL: 4.7 (ref 7–25)
CALCIUM SPEC-SCNC: 7 MG/DL (ref 8.6–10.5)
CHLORIDE SERPL-SCNC: 113 MMOL/L (ref 98–107)
CO2 SERPL-SCNC: 21.6 MMOL/L (ref 22–29)
CREAT SERPL-MCNC: 0.64 MG/DL (ref 0.76–1.27)
EGFRCR SERPLBLD CKD-EPI 2021: 121.2 ML/MIN/1.73
GLOBULIN UR ELPH-MCNC: 2.2 GM/DL
GLUCOSE SERPL-MCNC: 96 MG/DL (ref 65–99)
POTASSIUM SERPL-SCNC: 2.6 MMOL/L (ref 3.5–5.2)
PROT SERPL-MCNC: 5.2 G/DL (ref 6–8.5)
SODIUM SERPL-SCNC: 143 MMOL/L (ref 136–145)

## 2024-12-20 PROCEDURE — 25010000002 RISANKIZUMAB-RZAA 600 MG/10ML SOLUTION 10 ML VIAL: Performed by: NURSE PRACTITIONER

## 2024-12-20 PROCEDURE — 96365 THER/PROPH/DIAG IV INF INIT: CPT

## 2024-12-20 PROCEDURE — 25810000003 SODIUM CHLORIDE 0.9 % SOLUTION 250 ML FLEX CONT: Performed by: NURSE PRACTITIONER

## 2024-12-20 PROCEDURE — 80053 COMPREHEN METABOLIC PANEL: CPT | Performed by: NURSE PRACTITIONER

## 2024-12-20 RX ORDER — DIPHENHYDRAMINE HYDROCHLORIDE 50 MG/ML
50 INJECTION INTRAMUSCULAR; INTRAVENOUS ONCE
OUTPATIENT
Start: 2025-01-17 | End: 2025-01-17

## 2024-12-20 RX ADMIN — SODIUM CHLORIDE 600 MG: 9 INJECTION, SOLUTION INTRAVENOUS at 15:11

## 2024-12-20 NOTE — CODE DOCUMENTATION
Labs  drawn on patient and when resulted lab notified that K+ was critical at 2.6. A secure chat message was sent to VENECIA Cr and VENECIA Evans. At  the time of this note, there had been no response..Linda Richter RN

## 2024-12-21 ENCOUNTER — HOSPITAL ENCOUNTER (EMERGENCY)
Facility: HOSPITAL | Age: 44
Discharge: HOME | End: 2024-12-21
Attending: EMERGENCY MEDICINE
Payer: COMMERCIAL

## 2024-12-21 VITALS
TEMPERATURE: 97.9 F | HEART RATE: 74 BPM | SYSTOLIC BLOOD PRESSURE: 138 MMHG | RESPIRATION RATE: 16 BRPM | BODY MASS INDEX: 28.01 KG/M2 | WEIGHT: 206.79 LBS | DIASTOLIC BLOOD PRESSURE: 102 MMHG | OXYGEN SATURATION: 93 % | HEIGHT: 72 IN

## 2024-12-21 DIAGNOSIS — E87.6 HYPOKALEMIA: Primary | ICD-10-CM

## 2024-12-21 LAB
ALBUMIN SERPL-MCNC: 3.6 G/DL (ref 3.5–5.2)
ALBUMIN/GLOB SERPL: 1.2 G/DL
ALP SERPL-CCNC: 113 U/L (ref 39–117)
ALT SERPL W P-5'-P-CCNC: 12 U/L (ref 1–41)
ANION GAP SERPL CALCULATED.3IONS-SCNC: 11.7 MMOL/L (ref 5–15)
AST SERPL-CCNC: 14 U/L (ref 1–40)
BASOPHILS # BLD AUTO: 0.05 10*3/MM3 (ref 0–0.2)
BASOPHILS NFR BLD AUTO: 0.6 % (ref 0–1.5)
BILIRUB SERPL-MCNC: 0.2 MG/DL (ref 0–1.2)
BUN SERPL-MCNC: 5 MG/DL (ref 6–20)
BUN/CREAT SERPL: 5.7 (ref 7–25)
CALCIUM SPEC-SCNC: 8.7 MG/DL (ref 8.6–10.5)
CHLORIDE SERPL-SCNC: 107 MMOL/L (ref 98–107)
CO2 SERPL-SCNC: 22.3 MMOL/L (ref 22–29)
CREAT SERPL-MCNC: 0.87 MG/DL (ref 0.76–1.27)
DEPRECATED RDW RBC AUTO: 43.9 FL (ref 37–54)
EGFRCR SERPLBLD CKD-EPI 2021: 110.5 ML/MIN/1.73
EOSINOPHIL # BLD AUTO: 0.18 10*3/MM3 (ref 0–0.4)
EOSINOPHIL NFR BLD AUTO: 2.1 % (ref 0.3–6.2)
ERYTHROCYTE [DISTWIDTH] IN BLOOD BY AUTOMATED COUNT: 13.3 % (ref 12.3–15.4)
GLOBULIN UR ELPH-MCNC: 3 GM/DL
GLUCOSE SERPL-MCNC: 103 MG/DL (ref 65–99)
HCT VFR BLD AUTO: 46.7 % (ref 37.5–51)
HGB BLD-MCNC: 14.9 G/DL (ref 13–17.7)
HOLD SPECIMEN: NORMAL
HOLD SPECIMEN: NORMAL
IMM GRANULOCYTES # BLD AUTO: 0.04 10*3/MM3 (ref 0–0.05)
IMM GRANULOCYTES NFR BLD AUTO: 0.5 % (ref 0–0.5)
LYMPHOCYTES # BLD AUTO: 2.27 10*3/MM3 (ref 0.7–3.1)
LYMPHOCYTES NFR BLD AUTO: 25.9 % (ref 19.6–45.3)
MAGNESIUM SERPL-MCNC: 1.8 MG/DL (ref 1.6–2.6)
MCH RBC QN AUTO: 28.7 PG (ref 26.6–33)
MCHC RBC AUTO-ENTMCNC: 31.9 G/DL (ref 31.5–35.7)
MCV RBC AUTO: 90 FL (ref 79–97)
MONOCYTES # BLD AUTO: 0.77 10*3/MM3 (ref 0.1–0.9)
MONOCYTES NFR BLD AUTO: 8.8 % (ref 5–12)
NEUTROPHILS NFR BLD AUTO: 5.47 10*3/MM3 (ref 1.7–7)
NEUTROPHILS NFR BLD AUTO: 62.1 % (ref 42.7–76)
NRBC BLD AUTO-RTO: 0 /100 WBC (ref 0–0.2)
PLATELET # BLD AUTO: 310 10*3/MM3 (ref 140–450)
PMV BLD AUTO: 9.8 FL (ref 6–12)
POTASSIUM SERPL-SCNC: 3.4 MMOL/L (ref 3.5–5.2)
PROT SERPL-MCNC: 6.6 G/DL (ref 6–8.5)
QT INTERVAL: 367 MS
QTC INTERVAL: 409 MS
RBC # BLD AUTO: 5.19 10*6/MM3 (ref 4.14–5.8)
SODIUM SERPL-SCNC: 141 MMOL/L (ref 136–145)
WBC NRBC COR # BLD AUTO: 8.78 10*3/MM3 (ref 3.4–10.8)
WHOLE BLOOD HOLD COAG: NORMAL
WHOLE BLOOD HOLD SPECIMEN: NORMAL

## 2024-12-21 PROCEDURE — 96366 THER/PROPH/DIAG IV INF ADDON: CPT

## 2024-12-21 PROCEDURE — 99285 EMERGENCY DEPT VISIT HI MDM: CPT

## 2024-12-21 PROCEDURE — 25010000002 MAGNESIUM SULFATE 2 GM/50ML SOLUTION: Performed by: EMERGENCY MEDICINE

## 2024-12-21 PROCEDURE — 96365 THER/PROPH/DIAG IV INF INIT: CPT

## 2024-12-21 PROCEDURE — 96367 TX/PROPH/DG ADDL SEQ IV INF: CPT

## 2024-12-21 PROCEDURE — 25810000003 SODIUM CHLORIDE 0.9 % SOLUTION: Performed by: EMERGENCY MEDICINE

## 2024-12-21 PROCEDURE — 85025 COMPLETE CBC W/AUTO DIFF WBC: CPT | Performed by: EMERGENCY MEDICINE

## 2024-12-21 PROCEDURE — 80053 COMPREHEN METABOLIC PANEL: CPT | Performed by: EMERGENCY MEDICINE

## 2024-12-21 PROCEDURE — 93005 ELECTROCARDIOGRAM TRACING: CPT | Performed by: EMERGENCY MEDICINE

## 2024-12-21 PROCEDURE — 83735 ASSAY OF MAGNESIUM: CPT | Performed by: EMERGENCY MEDICINE

## 2024-12-21 PROCEDURE — 25010000002 POTASSIUM CHLORIDE 10 MEQ/100ML SOLUTION: Performed by: EMERGENCY MEDICINE

## 2024-12-21 RX ORDER — POTASSIUM CHLORIDE 750 MG/1
40 CAPSULE, EXTENDED RELEASE ORAL ONCE
Status: COMPLETED | OUTPATIENT
Start: 2024-12-21 | End: 2024-12-21

## 2024-12-21 RX ORDER — MAGNESIUM SULFATE HEPTAHYDRATE 40 MG/ML
2 INJECTION, SOLUTION INTRAVENOUS ONCE
Status: COMPLETED | OUTPATIENT
Start: 2024-12-21 | End: 2024-12-21

## 2024-12-21 RX ORDER — POTASSIUM CHLORIDE 7.45 MG/ML
10 INJECTION INTRAVENOUS ONCE
Status: COMPLETED | OUTPATIENT
Start: 2024-12-21 | End: 2024-12-21

## 2024-12-21 RX ORDER — POTASSIUM CHLORIDE 750 MG/1
10 TABLET, EXTENDED RELEASE ORAL DAILY
Qty: 10 TABLET | Refills: 0 | Status: SHIPPED | OUTPATIENT
Start: 2024-12-22 | End: 2025-01-01

## 2024-12-21 RX ORDER — SODIUM CHLORIDE 0.9 % (FLUSH) 0.9 %
10 SYRINGE (ML) INJECTION AS NEEDED
Status: DISCONTINUED | OUTPATIENT
Start: 2024-12-21 | End: 2024-12-21 | Stop reason: HOSPADM

## 2024-12-21 RX ORDER — POTASSIUM CHLORIDE 7.45 MG/ML
10 INJECTION INTRAVENOUS
Status: DISCONTINUED | OUTPATIENT
Start: 2024-12-21 | End: 2024-12-21

## 2024-12-21 RX ADMIN — SODIUM CHLORIDE 500 ML: 9 INJECTION, SOLUTION INTRAVENOUS at 12:46

## 2024-12-21 RX ADMIN — POTASSIUM CHLORIDE 10 MEQ: 7.45 INJECTION INTRAVENOUS at 12:44

## 2024-12-21 RX ADMIN — POTASSIUM CHLORIDE 10 MEQ: 7.45 INJECTION INTRAVENOUS at 16:08

## 2024-12-21 RX ADMIN — MAGNESIUM SULFATE HEPTAHYDRATE 2 G: 40 INJECTION, SOLUTION INTRAVENOUS at 13:51

## 2024-12-21 RX ADMIN — POTASSIUM CHLORIDE 10 MEQ: 7.45 INJECTION INTRAVENOUS at 17:37

## 2024-12-21 RX ADMIN — POTASSIUM CHLORIDE 40 MEQ: 750 CAPSULE, EXTENDED RELEASE ORAL at 12:09

## 2024-12-21 NOTE — Clinical Note
[facility] EMERGENCY ROOM  [address] [place] [address]  Phone: [phone]  Fax: [phone]    Chris Dahl was seen and treated in our emergency department on 12/21/2024.  He may return to work on 12/23/2024.         Thank you for choosing [facility].    Miah Pearce,

## 2024-12-21 NOTE — DISCHARGE INSTRUCTIONS
Resume normal home activities.  Take the potassium prescription daily as prescribed.  With the either your PCP or your GI provider in 10 days to have your potassium levels rechecked to determine if you need to continue on daily potassium supplements or not.  Return to the ER for any other concerns issues that may arise.

## 2024-12-21 NOTE — ED PROVIDER NOTES
Time: 2:12 PM EST  Date of encounter:  12/21/2024  Independent Historian/Clinical History and Information was obtained by:   Patient  Chief Complaint: Low potassium level    History is limited by: N/A    History of Present Illness:  Patient is a 44 y.o. year old male who presents to the emergency department for evaluation of low potassium level.  Patient states he got a phone call this morning stating that his labs that were drawn yesterday revealed him to have a critical low potassium level and that he needed to go to the ER immediately.  Patient denies any physical complaints.    Patient states he had labs drawn yesterday as part of routine evaluation.  Patient had his labs drawn prior to obtaining a Skyrizi infusion for his Crohn's disease.    HPI    Patient Care Team  Primary Care Provider: Catherine Roach APRN    Past Medical History:     No Known Allergies  Past Medical History:   Diagnosis Date    Crohn's disease      Past Surgical History:   Procedure Laterality Date    ABDOMINAL SURGERY      partial colonectomy    APPENDECTOMY      COLONOSCOPY      COLONOSCOPY N/A 2/22/2024    Procedure: COLONOSCOPY WITH BIOPSIES;  Surgeon: Adis Blackman MD;  Location: [facility] ENDOS[facility];  Service: Gastroenterology;  Laterality: N/A;  TERMINAL ILEUM ULCER, PREVIOUS SURGERY, HEMORRHOIDS     Family History   Problem Relation Age of Onset    Crohn's disease Paternal Aunt     Colon cancer Neg Hx        Home Medications:  Prior to Admission medications    Medication Sig Start Date End Date Taking? Authorizing Provider   cetirizine (zyrTEC) 10 MG tablet Take 1 tablet by mouth Every Night. Indications: Hayfever 10/3/24   Catherine Roach APRN   ferrous sulfate 325 (65 FE) MG tablet Take 1 tablet by mouth Daily With Breakfast.    Provider, MD Dequan   losartan (Cozaar) 50 MG tablet Take 1 tablet by mouth 2 (Two) Times a Day. Indications: High Blood Pressure 11/26/24   Catherine Roach APRN   ondansetron  "ODT (ZOFRAN-ODT) 4 MG disintegrating tablet Place 1 tablet on the tongue Every 8 (Eight) Hours As Needed for Nausea or Vomiting. 11/12/24   Rafiq Best DO   pantoprazole (Protonix) 40 MG EC tablet Take 1 tablet by mouth Daily. 11/21/24   Kari Phipps APRN   predniSONE (DELTASONE) 10 MG tablet Take 2 x 1 day then 1 x day 11/21/24   Kari Phipps APRN        Social History:   Social History     Tobacco Use    Smoking status: Every Day     Current packs/day: 1.50     Average packs/day: 1.5 packs/day for 25.0 years (37.5 ttl pk-yrs)     Types: Cigarettes     Passive exposure: Past    Smokeless tobacco: Never   Vaping Use    Vaping status: Never Used   Substance Use Topics    Alcohol use: Yes     Comment: occ    Drug use: Never         Review of Systems:  Review of Systems   Constitutional:  Negative for chills and fever.   HENT:  Negative for congestion, ear pain and sore throat.    Eyes:  Negative for pain.   Respiratory:  Negative for cough, chest tightness and shortness of breath.    Cardiovascular:  Negative for chest pain.   Gastrointestinal:  Negative for abdominal pain, diarrhea, nausea and vomiting.   Genitourinary:  Negative for flank pain and hematuria.   Musculoskeletal:  Negative for joint swelling.   Skin:  Negative for pallor.   Neurological:  Negative for seizures and headaches.   All other systems reviewed and are negative.       Physical Exam:  BP (!) 167/103   Pulse 76   Temp 97.9 °F (36.6 °C) (Oral)   Resp 16   Ht 182.9 cm (72\")   Wt 93.8 kg (206 lb 12.7 oz)   SpO2 94%   BMI 28.05 kg/m²     Physical Exam    Vital signs were reviewed under triage note.  General appearance - Patient appears well-developed and well-nourished.  Patient is in no acute distress.  Head - Normocephalic, atraumatic.  Pupils - Equal, round, reactive to light.  Extraocular muscles are intact.  Conjunctiva is clear.  Nasal - Normal inspection.  No evidence of trauma or epistaxis.  Tympanic " membranes - Gray, intact without erythema or retractions.  Oral mucosa - Pink and moist without lesions or erythema.  Uvula is midline.  Chest wall - Atraumatic.  Chest wall is nontender.  There are no vesicular rashes noted.  Neck - Supple.  Trachea was midline.  There is no palpable lymphadenopathy or thyromegaly.  There are no meningeal signs  Lungs - Clear to auscultation and percussion bilaterally.  Heart - Regular rate and rhythm without any murmurs, clicks, or gallops.  Abdomen - Soft.  Bowel sounds are present.  There is no palpable tenderness.  There is no rebound, guarding, or rigidity.  There are no palpable masses.  There are no pulsatile masses.  Back - Spine is straight and midline.  There is no CVA tenderness.  Extremities - Intact x4 with full range of motion.  There is no palpable edema.  Pulses are intact x4 and equal.  Neurologic - Patient is awake, alert, and oriented x3.  Cranial nerves II through XII are grossly intact.  Motor and sensory functions grossly intact.  Cerebellar function was normal.  Integument - There are no rashes.  There are no petechia or purpura lesions noted.  There are no vesicular lesions noted.           Procedures:  Procedures      Medical Decision Making:      Comorbidities that affect care:    Crohn's disease    External Notes reviewed:    Previous Labs: Patient's chemistries were reviewed from yesterday which revealed a potassium level of 2.6.      The following orders were placed and all results were independently analyzed by me:  Orders Placed This Encounter   Procedures    Comprehensive Metabolic Panel    [place] Draw    CBC Auto Differential    Magnesium    ECG 12 Lead Electrolyte Imbalance    Insert peripheral IV    CBC & Differential    Green Top (Gel)    Lavender Top    Gold Top - SST    Light Blue Top       Medications Given in the Emergency Department:  Medications   sodium chloride 0.9 % flush 10 mL (has no administration in time range)   potassium chloride  10 mEq in 100 mL IVPB (0 mEq Intravenous Stopped 12/21/24 1344)   sodium chloride 0.9 % bolus 500 mL (500 mL Intravenous New Bag 12/21/24 1246)   potassium chloride (MICRO-K/KLOR-CON) CR capsule (40 mEq Oral Given 12/21/24 1209)   magnesium sulfate 2g/50 mL (PREMIX) infusion (2 g Intravenous New Bag 12/21/24 1351)        ED Course:    ED Course as of 12/23/24 1032   Mon Dec 23, 2024   1031 EKG performed at 1119 was interpreted by me showing normal sinus rhythm with a ventricular rate of 74 bpm.  The VT interval was 167 ms.  P waves are normal.  QRS interval is normal.  Axis is a 36 degrees.  There are no acute ischemic ST or T wave changes identified.  QT corrected was 409 ms. [TB]      ED Course User Index  [TB] Miah Pearce DO   The patient was seen and evaluated in the ED by me.  The above history and physical examination was performed as documented.  Diagnostic data was obtained.  Results reviewed.  I also reviewed his lab data from yesterday.  Patient's potassium yesterday was 2.6.  Today's potassium level was 3.4 but there was notation of some slight hemolysis which probably partially artificially elevated his level.  Patient was given a course of p.o. and IV steroids.  Additionally his magnesium level was on the low normal side so I gave him additional dose of magnesium sulfate.  Once this has been completed the patient for discharge home.  I will send him home with a weeks worth of potassium supplement replacement.  I advised him to follow-up with his GI provider for repeat laboratory data to monitor his potassium levels.  He can also follow-up with his PCP if necessary.    Labs:    Lab Results (last 24 hours)       Procedure Component Value Units Date/Time    Comprehensive Metabolic Panel [[id number]]  (Abnormal) Collected: 12/20/24 1508    Specimen: Blood Updated: 12/20/24 1532     Glucose 96 mg/dL      BUN 3 mg/dL      Creatinine 0.64 mg/dL      Sodium 143 mmol/L      Potassium 2.6 mmol/L      Chloride 113  mmol/L      CO2 21.6 mmol/L      Calcium 7.0 mg/dL      Total Protein 5.2 g/dL      Albumin 3.0 g/dL      ALT (SGPT) 9 U/L      AST (SGOT) 9 U/L      Alkaline Phosphatase 89 U/L      Total Bilirubin 0.2 mg/dL      Globulin 2.2 gm/dL      A/G Ratio 1.4 g/dL      BUN/Creatinine Ratio 4.7     Anion Gap 8.4 mmol/L      eGFR 121.2 mL/min/1.73     Narrative:      GFR Categories in Chronic Kidney Disease (CKD)      GFR Category          GFR (mL/min/1.73)    Interpretation  G1                     90 or greater         Normal or high (1)  G2                      60-89                Mild decrease (1)  G3a                   45-59                Mild to moderate decrease  G3b                   30-44                Moderate to severe decrease  G4                    15-29                Severe decrease  G5                    14 or less           Kidney failure          (1)In the absence of evidence of kidney disease, neither GFR category G1 or G2 fulfill the criteria for CKD.    eGFR calculation 2021 CKD-EPI creatinine equation, which does not include race as a factor    CBC & Differential [[id number]]  (Normal) Collected: 12/21/24 1107    Specimen: Blood Updated: 12/21/24 1117    Narrative:      The following orders were created for panel order CBC & Differential.  Procedure                               Abnormality         Status                     ---------                               -----------         ------                     CBC Auto Differential[[id number]]        Normal              Final result                 Please view results for these tests on the individual orders.    Comprehensive Metabolic Panel [[id number]]  (Abnormal) Collected: 12/21/24 1107    Specimen: Blood Updated: 12/21/24 1202     Glucose 103 mg/dL      BUN 5 mg/dL      Creatinine 0.87 mg/dL      Sodium 141 mmol/L      Potassium 3.4 mmol/L      Comment: Slight hemolysis detected by analyzer. Result may be falsely elevated.        Chloride 107 mmol/L       CO2 22.3 mmol/L      Calcium 8.7 mg/dL      Total Protein 6.6 g/dL      Albumin 3.6 g/dL      ALT (SGPT) 12 U/L      AST (SGOT) 14 U/L      Alkaline Phosphatase 113 U/L      Total Bilirubin 0.2 mg/dL      Globulin 3.0 gm/dL      A/G Ratio 1.2 g/dL      BUN/Creatinine Ratio 5.7     Anion Gap 11.7 mmol/L      eGFR 110.5 mL/min/1.73     Narrative:      GFR Categories in Chronic Kidney Disease (CKD)      GFR Category          GFR (mL/min/1.73)    Interpretation  G1                     90 or greater         Normal or high (1)  G2                      60-89                Mild decrease (1)  G3a                   45-59                Mild to moderate decrease  G3b                   30-44                Moderate to severe decrease  G4                    15-29                Severe decrease  G5                    14 or less           Kidney failure          (1)In the absence of evidence of kidney disease, neither GFR category G1 or G2 fulfill the criteria for CKD.    eGFR calculation 2021 CKD-EPI creatinine equation, which does not include race as a factor    CBC Auto Differential [[id number]]  (Normal) Collected: 12/21/24 1107    Specimen: Blood Updated: 12/21/24 1117     WBC 8.78 10*3/mm3      RBC 5.19 10*6/mm3      Hemoglobin 14.9 g/dL      Hematocrit 46.7 %      MCV 90.0 fL      MCH 28.7 pg      MCHC 31.9 g/dL      RDW 13.3 %      RDW-SD 43.9 fl      MPV 9.8 fL      Platelets 310 10*3/mm3      Neutrophil % 62.1 %      Lymphocyte % 25.9 %      Monocyte % 8.8 %      Eosinophil % 2.1 %      Basophil % 0.6 %      Immature Grans % 0.5 %      Neutrophils, Absolute 5.47 10*3/mm3      Lymphocytes, Absolute 2.27 10*3/mm3      Monocytes, Absolute 0.77 10*3/mm3      Eosinophils, Absolute 0.18 10*3/mm3      Basophils, Absolute 0.05 10*3/mm3      Immature Grans, Absolute 0.04 10*3/mm3      nRBC 0.0 /100 WBC     Magnesium [[id number]]  (Normal) Collected: 12/21/24 1107    Specimen: Blood Updated: 12/21/24 1217     Magnesium 1.8  mg/dL              Imaging:    No Radiology Exams Resulted Within Past 24 Hours      Differential Diagnosis and Discussion:    Metabolic: Differential diagnosis includes but is not limited to hypertension, hyperglycemia, hyperkalemia, hypocalcemia, metabolic acidosis, hypokalemia, hypoglycemia, malnutrition, hypothyroidism, hyperthyroidism, and adrenal insufficiency.     Labs were collected in the emergency department and all labs were reviewed and interpreted by me.  An EKG was performed and the EKG was interpreted by me.    [facility]       Critical Care Note: Total Critical Care time of 35 minutes. Total critical care time documented does not include time spent on separately billed procedures for services of nurses or physician assistants. I personally saw and examined the patient. I have reviewed all diagnostic interpretations and treatment plans as written. I was present for the key portions of any procedures performed and the inclusive time noted in any critical care statement. Critical care time includes patient management by me, time spent at the patients bedside,  time to review lab and imaging results, discussing patient care, documentation in the medical record, and time spent with family or caregiver.    Patient Care Considerations:    SEPSIS was considered but is NOT present in the emergency department as SIRS criteria is not present.      Consultants/Shared Management Plan:    None    Social Determinants of Health:    Patient is independent, reliable, and has access to care.       Disposition and Care Coordination:    Discharged: The patient is suitable and stable for discharge with no need for consideration of admission.    I have explained the patient´s condition, diagnoses and treatment plan based on the information available to me at this time. I have answered questions and addressed any concerns. The patient has a good  understanding of the patient´s diagnosis, condition, and treatment plan as can be  expected at this point. The vital signs have been stable. The patient´s condition is stable and appropriate for discharge from the emergency department.      The patient will pursue further outpatient evaluation with the primary care physician or other designated or consulting physician as outlined in the discharge instructions. They are agreeable to this plan of care and follow-up instructions have been explained in detail. The patient has received these instructions in written format and has expressed an understanding of the discharge instructions. The patient is aware that any significant change in condition or worsening of symptoms should prompt an immediate return to this or the closest emergency department or call to 911.  I have explained discharge medications and the need for follow up with the patient/caretakers. This was also printed in the discharge instructions. Patient was discharged with the following medications and follow up:      Medication List        New Prescriptions      potassium chloride 10 MEQ CR tablet  Take 1 tablet by mouth Daily for 10 days.               Where to Get Your Medications        These medications were sent to [facility]/pharmacy #[id number] - [place], [place] - [address] - [phone] [facility] [phone] FX  [address] [place] [address]      Hours: 24-hours Phone: [phone]   potassium chloride 10 MEQ CR tablet      Catherine Roach, APRN  [address]  [place] [address] [phone]    In 10 days  To have your electro levels reevaluated.      Follow-up with your GI provider in 10 days to have your potassium levels reevaluated.          Final diagnoses:   Hypokalemia        ED Disposition       ED Disposition   Discharge    Condition   Stable    Comment   --               This medical record created using voice recognition software.             Miah Pearce DO  12/23/24 8585

## 2024-12-23 ENCOUNTER — LAB (OUTPATIENT)
Facility: HOSPITAL | Age: 44
End: 2024-12-23
Payer: COMMERCIAL

## 2024-12-23 DIAGNOSIS — K50.10 CROHN'S DISEASE OF LARGE INTESTINE WITHOUT COMPLICATION: ICD-10-CM

## 2024-12-23 DIAGNOSIS — K50.819 CROHN'S DISEASE OF SMALL AND LARGE INTESTINES WITH COMPLICATION: ICD-10-CM

## 2024-12-23 DIAGNOSIS — E87.6 HYPOKALEMIA: ICD-10-CM

## 2024-12-23 DIAGNOSIS — I10 PRIMARY HYPERTENSION: ICD-10-CM

## 2024-12-23 DIAGNOSIS — K50.819 CROHN'S DISEASE OF SMALL AND LARGE INTESTINES WITH COMPLICATION: Primary | ICD-10-CM

## 2024-12-23 DIAGNOSIS — Z00.00 ANNUAL PHYSICAL EXAM: ICD-10-CM

## 2024-12-23 DIAGNOSIS — Z11.3 SCREEN FOR STD (SEXUALLY TRANSMITTED DISEASE): ICD-10-CM

## 2024-12-23 LAB
ALBUMIN SERPL-MCNC: 3.9 G/DL (ref 3.5–5.2)
ALBUMIN/GLOB SERPL: 1.3 G/DL
ALP SERPL-CCNC: 134 U/L (ref 39–117)
ALT SERPL W P-5'-P-CCNC: 15 U/L (ref 1–41)
ANION GAP SERPL CALCULATED.3IONS-SCNC: 13.7 MMOL/L (ref 5–15)
AST SERPL-CCNC: 18 U/L (ref 1–40)
BASOPHILS # BLD AUTO: 0.05 10*3/MM3 (ref 0–0.2)
BASOPHILS NFR BLD AUTO: 0.5 % (ref 0–1.5)
BILIRUB SERPL-MCNC: <0.2 MG/DL (ref 0–1.2)
BUN SERPL-MCNC: 5 MG/DL (ref 6–20)
BUN/CREAT SERPL: 5.3 (ref 7–25)
CALCIUM SPEC-SCNC: 9.2 MG/DL (ref 8.6–10.5)
CHLORIDE SERPL-SCNC: 104 MMOL/L (ref 98–107)
CHOLEST SERPL-MCNC: 144 MG/DL (ref 0–200)
CO2 SERPL-SCNC: 21.3 MMOL/L (ref 22–29)
CREAT SERPL-MCNC: 0.94 MG/DL (ref 0.76–1.27)
CRP SERPL-MCNC: 0.88 MG/DL (ref 0–0.5)
DEPRECATED RDW RBC AUTO: 41.2 FL (ref 37–54)
EGFRCR SERPLBLD CKD-EPI 2021: 103.8 ML/MIN/1.73
EOSINOPHIL # BLD AUTO: 0.19 10*3/MM3 (ref 0–0.4)
EOSINOPHIL NFR BLD AUTO: 1.8 % (ref 0.3–6.2)
ERYTHROCYTE [DISTWIDTH] IN BLOOD BY AUTOMATED COUNT: 13 % (ref 12.3–15.4)
ERYTHROCYTE [SEDIMENTATION RATE] IN BLOOD: 20 MM/HR (ref 0–15)
GLOBULIN UR ELPH-MCNC: 3.1 GM/DL
GLUCOSE SERPL-MCNC: 101 MG/DL (ref 65–99)
HBV SURFACE AB SER RIA-ACNC: NORMAL
HBV SURFACE AG SERPL QL IA: NORMAL
HCT VFR BLD AUTO: 47.4 % (ref 37.5–51)
HDLC SERPL-MCNC: 29 MG/DL (ref 40–60)
HGB BLD-MCNC: 16.2 G/DL (ref 13–17.7)
HIV 1+2 AB+HIV1 P24 AG SERPL QL IA: NORMAL
IMM GRANULOCYTES # BLD AUTO: 0.05 10*3/MM3 (ref 0–0.05)
IMM GRANULOCYTES NFR BLD AUTO: 0.5 % (ref 0–0.5)
IRON 24H UR-MRATE: 36 MCG/DL (ref 59–158)
IRON SATN MFR SERPL: 10 % (ref 20–50)
LDLC SERPL CALC-MCNC: 86 MG/DL (ref 0–100)
LDLC/HDLC SERPL: 2.82 {RATIO}
LYMPHOCYTES # BLD AUTO: 2.86 10*3/MM3 (ref 0.7–3.1)
LYMPHOCYTES NFR BLD AUTO: 26.9 % (ref 19.6–45.3)
MCH RBC QN AUTO: 29.8 PG (ref 26.6–33)
MCHC RBC AUTO-ENTMCNC: 34.2 G/DL (ref 31.5–35.7)
MCV RBC AUTO: 87.3 FL (ref 79–97)
MONOCYTES # BLD AUTO: 0.88 10*3/MM3 (ref 0.1–0.9)
MONOCYTES NFR BLD AUTO: 8.3 % (ref 5–12)
NEUTROPHILS NFR BLD AUTO: 6.62 10*3/MM3 (ref 1.7–7)
NEUTROPHILS NFR BLD AUTO: 62 % (ref 42.7–76)
NRBC BLD AUTO-RTO: 0 /100 WBC (ref 0–0.2)
PLATELET # BLD AUTO: 366 10*3/MM3 (ref 140–450)
PMV BLD AUTO: 10.4 FL (ref 6–12)
POTASSIUM SERPL-SCNC: 4 MMOL/L (ref 3.5–5.2)
PROT SERPL-MCNC: 7 G/DL (ref 6–8.5)
RBC # BLD AUTO: 5.43 10*6/MM3 (ref 4.14–5.8)
SODIUM SERPL-SCNC: 139 MMOL/L (ref 136–145)
TIBC SERPL-MCNC: 344 MCG/DL (ref 298–536)
TRANSFERRIN SERPL-MCNC: 231 MG/DL (ref 200–360)
TRIGL SERPL-MCNC: 166 MG/DL (ref 0–150)
TSH SERPL DL<=0.05 MIU/L-ACNC: 2 UIU/ML (ref 0.27–4.2)
VLDLC SERPL-MCNC: 29 MG/DL (ref 5–40)
WBC NRBC COR # BLD AUTO: 10.65 10*3/MM3 (ref 3.4–10.8)

## 2024-12-23 PROCEDURE — 36415 COLL VENOUS BLD VENIPUNCTURE: CPT

## 2024-12-23 PROCEDURE — 86696 HERPES SIMPLEX TYPE 2 TEST: CPT

## 2024-12-23 PROCEDURE — 86140 C-REACTIVE PROTEIN: CPT

## 2024-12-23 PROCEDURE — 86480 TB TEST CELL IMMUN MEASURE: CPT

## 2024-12-23 PROCEDURE — 87340 HEPATITIS B SURFACE AG IA: CPT

## 2024-12-23 PROCEDURE — 86695 HERPES SIMPLEX TYPE 1 TEST: CPT

## 2024-12-23 PROCEDURE — 80050 GENERAL HEALTH PANEL: CPT

## 2024-12-23 PROCEDURE — 87385 HISTOPLASMA CAPSUL AG IA: CPT

## 2024-12-23 PROCEDURE — G0432 EIA HIV-1/HIV-2 SCREEN: HCPCS

## 2024-12-23 PROCEDURE — 86706 HEP B SURFACE ANTIBODY: CPT

## 2024-12-23 PROCEDURE — 85652 RBC SED RATE AUTOMATED: CPT

## 2024-12-23 PROCEDURE — 83540 ASSAY OF IRON: CPT

## 2024-12-23 PROCEDURE — 84466 ASSAY OF TRANSFERRIN: CPT

## 2024-12-23 PROCEDURE — 86704 HEP B CORE ANTIBODY TOTAL: CPT

## 2024-12-23 PROCEDURE — 80061 LIPID PANEL: CPT

## 2024-12-24 LAB
HBV CORE AB SERPL QL IA: NEGATIVE
HSV1 IGG SERPL QL IA: REACTIVE
HSV2 IGG SERPL QL IA: NON REACTIVE

## 2024-12-25 LAB
GAMMA INTERFERON BACKGROUND BLD IA-ACNC: 0.03 IU/ML
M TB IFN-G BLD-IMP: NEGATIVE
M TB IFN-G CD4+ BCKGRND COR BLD-ACNC: 0.02 IU/ML
M TB IFN-G CD4+CD8+ BCKGRND COR BLD-ACNC: 0.02 IU/ML
MITOGEN IGNF BCKGRD COR BLD-ACNC: >10 IU/ML
QUANTIFERON INCUBATION: NORMAL
SERVICE CMNT-IMP: NORMAL

## 2024-12-26 LAB
QT INTERVAL: 367 MS
QTC INTERVAL: 409 MS

## 2024-12-27 LAB — H CAPSUL AG UR QL IA: NEGATIVE

## 2024-12-30 DIAGNOSIS — R74.8 ELEVATED ALKALINE PHOSPHATASE LEVEL: Primary | ICD-10-CM

## 2024-12-30 NOTE — PROGRESS NOTES
Please have patient majority of labs are stable, increase noted in 1 liver enzyme so request patient to return to lab for additional labs to further work this up  He recently started Skyrizi patient needs repeat liver enzymes the end of January for this please remind him

## 2025-01-03 ENCOUNTER — LAB (OUTPATIENT)
Facility: HOSPITAL | Age: 45
End: 2025-01-03
Payer: COMMERCIAL

## 2025-01-03 ENCOUNTER — HOSPITAL ENCOUNTER (OUTPATIENT)
Dept: ULTRASOUND IMAGING | Facility: HOSPITAL | Age: 45
Discharge: HOME | End: 2025-01-03
Payer: COMMERCIAL

## 2025-01-03 DIAGNOSIS — K50.819 CROHN'S DISEASE OF SMALL AND LARGE INTESTINES WITH COMPLICATION: ICD-10-CM

## 2025-01-03 DIAGNOSIS — R74.8 ELEVATED ALKALINE PHOSPHATASE LEVEL: ICD-10-CM

## 2025-01-03 DIAGNOSIS — K82.4 GALLBLADDER POLYP: ICD-10-CM

## 2025-01-03 LAB
ALBUMIN SERPL-MCNC: 3.7 G/DL (ref 3.5–5.2)
ALBUMIN/GLOB SERPL: 1.2 G/DL
ALP SERPL-CCNC: 116 U/L (ref 39–117)
ALT SERPL W P-5'-P-CCNC: 9 U/L (ref 1–41)
ANION GAP SERPL CALCULATED.3IONS-SCNC: 9.1 MMOL/L (ref 5–15)
AST SERPL-CCNC: 12 U/L (ref 1–40)
BILIRUB SERPL-MCNC: 0.3 MG/DL (ref 0–1.2)
BUN SERPL-MCNC: 4 MG/DL (ref 6–20)
BUN/CREAT SERPL: 4 (ref 7–25)
CALCIUM SPEC-SCNC: 9.1 MG/DL (ref 8.6–10.5)
CHLORIDE SERPL-SCNC: 105 MMOL/L (ref 98–107)
CO2 SERPL-SCNC: 26.9 MMOL/L (ref 22–29)
CREAT SERPL-MCNC: 0.99 MG/DL (ref 0.76–1.27)
EGFRCR SERPLBLD CKD-EPI 2021: 97.5 ML/MIN/1.73
GGT SERPL-CCNC: 14 U/L (ref 8–61)
GLOBULIN UR ELPH-MCNC: 3.2 GM/DL
GLUCOSE SERPL-MCNC: 105 MG/DL (ref 65–99)
POTASSIUM SERPL-SCNC: 3.8 MMOL/L (ref 3.5–5.2)
PROT SERPL-MCNC: 6.9 G/DL (ref 6–8.5)
SODIUM SERPL-SCNC: 141 MMOL/L (ref 136–145)

## 2025-01-03 PROCEDURE — 76705 ECHO EXAM OF ABDOMEN: CPT

## 2025-01-03 PROCEDURE — 36415 COLL VENOUS BLD VENIPUNCTURE: CPT

## 2025-01-03 PROCEDURE — 86381 MITOCHONDRIAL ANTIBODY EACH: CPT

## 2025-01-03 PROCEDURE — 82977 ASSAY OF GGT: CPT

## 2025-01-03 PROCEDURE — 80053 COMPREHEN METABOLIC PANEL: CPT

## 2025-01-04 LAB — MITOCHONDRIA M2 IGG SER-ACNC: <20 UNITS (ref 0–20)

## 2025-01-07 ENCOUNTER — TELEPHONE (OUTPATIENT)
Dept: GASTROENTEROLOGY | Facility: CLINIC | Age: 45
End: 2025-01-07
Payer: COMMERCIAL

## 2025-01-07 NOTE — TELEPHONE ENCOUNTER
Attempted to contact pt, he did not answer but I left a detailed message with VENECIA Cr's note below (okay per VR) and requested pt keep F/U appt.

## 2025-01-07 NOTE — TELEPHONE ENCOUNTER
----- Message from Kari Phipps sent at 1/6/2025  1:08 PM EST -----  2 mm gallbladder polyp that is stable, CBD measures 5 mm, repeat 6 months-will order at follow-up

## 2025-01-10 ENCOUNTER — PATIENT MESSAGE (OUTPATIENT)
Dept: GASTROENTEROLOGY | Facility: CLINIC | Age: 45
End: 2025-01-10
Payer: COMMERCIAL

## 2025-01-13 ENCOUNTER — TELEPHONE (OUTPATIENT)
Dept: GASTROENTEROLOGY | Facility: CLINIC | Age: 45
End: 2025-01-13
Payer: COMMERCIAL

## 2025-01-13 NOTE — TELEPHONE ENCOUNTER
Dr. Blackman advised that he will need to be out of the office on 2.7.25.     Patient was on the schedule for an ENDO procedure this date. I spoke with patient and have rescheduled this appointment.       Procedure: EGD and colonoscopy      New Appointment Date: 2.28.25     CHECK CLEARANCE NEED!!!  No Clearance Needed

## 2025-01-23 ENCOUNTER — HOSPITAL ENCOUNTER (OUTPATIENT)
Dept: INFUSION THERAPY | Facility: HOSPITAL | Age: 45
Discharge: HOME | End: 2025-01-23
Payer: COMMERCIAL

## 2025-01-23 VITALS
HEART RATE: 84 BPM | SYSTOLIC BLOOD PRESSURE: 146 MMHG | RESPIRATION RATE: 20 BRPM | WEIGHT: 209 LBS | OXYGEN SATURATION: 96 % | BODY MASS INDEX: 28.34 KG/M2 | DIASTOLIC BLOOD PRESSURE: 100 MMHG

## 2025-01-23 DIAGNOSIS — K50.819 CROHN'S DISEASE OF SMALL AND LARGE INTESTINES WITH COMPLICATION: Primary | ICD-10-CM

## 2025-01-23 PROCEDURE — 96365 THER/PROPH/DIAG IV INF INIT: CPT

## 2025-01-23 PROCEDURE — 25010000002 RISANKIZUMAB-RZAA 600 MG/10ML SOLUTION 10 ML VIAL: Performed by: NURSE PRACTITIONER

## 2025-01-23 PROCEDURE — 25810000003 SODIUM CHLORIDE 0.9 % SOLUTION 250 ML FLEX CONT: Performed by: NURSE PRACTITIONER

## 2025-01-23 RX ORDER — DIPHENHYDRAMINE HYDROCHLORIDE 50 MG/ML
50 INJECTION INTRAMUSCULAR; INTRAVENOUS ONCE
OUTPATIENT
Start: 2025-02-14 | End: 2025-02-14

## 2025-01-23 RX ADMIN — SODIUM CHLORIDE 600 MG: 9 INJECTION, SOLUTION INTRAVENOUS at 12:26

## 2025-02-07 ENCOUNTER — OFFICE VISIT (OUTPATIENT)
Dept: FAMILY MEDICINE CLINIC | Facility: CLINIC | Age: 45
End: 2025-02-07
Payer: COMMERCIAL

## 2025-02-07 VITALS
HEART RATE: 88 BPM | TEMPERATURE: 97.7 F | DIASTOLIC BLOOD PRESSURE: 88 MMHG | SYSTOLIC BLOOD PRESSURE: 132 MMHG | HEIGHT: 72 IN | WEIGHT: 202.8 LBS | OXYGEN SATURATION: 96 % | BODY MASS INDEX: 27.47 KG/M2

## 2025-02-07 DIAGNOSIS — F41.1 GAD (GENERALIZED ANXIETY DISORDER): ICD-10-CM

## 2025-02-07 DIAGNOSIS — I10 PRIMARY HYPERTENSION: Primary | ICD-10-CM

## 2025-02-07 DIAGNOSIS — J30.2 SEASONAL ALLERGIC RHINITIS, UNSPECIFIED TRIGGER: ICD-10-CM

## 2025-02-07 PROCEDURE — 99214 OFFICE O/P EST MOD 30 MIN: CPT | Performed by: NURSE PRACTITIONER

## 2025-02-07 RX ORDER — CETIRIZINE HYDROCHLORIDE 10 MG/1
10 TABLET ORAL NIGHTLY
Qty: 90 TABLET | Refills: 3 | Status: SHIPPED | OUTPATIENT
Start: 2025-02-07

## 2025-02-07 RX ORDER — LOSARTAN POTASSIUM 50 MG/1
50 TABLET ORAL 2 TIMES DAILY
Qty: 180 TABLET | Refills: 1 | Status: SHIPPED | OUTPATIENT
Start: 2025-02-07

## 2025-02-07 NOTE — ASSESSMENT & PLAN NOTE
Blood pressure is stable on losartan 50 mg twice daily, will continue current dose.  Recommend that he check his blood pressure at home.  He does not have a blood pressure cuff.  I we will have him see the hypertension clinic today.  He will follow-up with me in 3 months or sooner if any new or worsening symptoms.    Orders:    losartan (Cozaar) 50 MG tablet; Take 1 tablet by mouth 2 (Two) Times a Day. Indications: High Blood Pressure

## 2025-02-07 NOTE — PROGRESS NOTES
"Chief Complaint  Hypertension and Anxiety    Subjective            Chris Dahl is a 44 y.o. male who presents to [facility]   History of Present Illness  3-month follow-up on uncontrolled hypertension and lightheadedness.  I increased his losartan 50 mg to twice daily.     DESTINEE 14, PHQ 1.  He does admit to having anxiety but he denies any depression.  He states his wife wanted to have him ask about anitdepressant meds.  He states he has been on Zoloft before but it has been years ago.  He does not remember having any side effects.    He has seasonal allergies and takes Zyrtec daily and needs a refill.    PHQ-2 Depression Screening  Little interest or pleasure in doing things? Not at all   Feeling down, depressed, or hopeless? Several days   PHQ-2 Total Score 1         Tobacco Use: High Risk (2/7/2025)    Patient History     Smoking Tobacco Use: Every Day     Smokeless Tobacco Use: Never     Passive Exposure: Past      E-cigarette/Vaping    E-cigarette/Vaping Use Never User      E-cigarette/Vaping Substances    Nicotine No     THC No     CBD No     Flavoring No      E-cigarette/Vaping Devices    Disposable No     Pre-filled or Refillable Cartridge No     Refillable Tank No     Pre-filled Pod No        Alcohol Use: Not At Risk (11/10/2024)    AUDIT-C     Frequency of Alcohol Consumption: Monthly or less     Average Number of Drinks: 1 or 2     Frequency of Binge Drinking: Never         Objective   Vital Signs:   Vitals:    02/07/25 1403 02/07/25 1406   BP: 151/95 132/88   BP Location: Left arm    Patient Position: Sitting    Cuff Size: Adult    Pulse: 88    Temp: 97.7 °F (36.5 °C)    SpO2: 96%    Weight: 92 kg (202 lb 12.8 oz)    Height: 182.9 cm (72\")    PainSc: 0-No pain      Body mass index is 27.5 kg/m².    Wt Readings from Last 3 Encounters:   02/07/25 92 kg (202 lb 12.8 oz)   01/23/25 94.8 kg (208 lb 15.9 oz)   12/21/24 93.8 kg (206 lb 12.7 oz)     BP Readings from Last 3 " "Encounters:   02/07/25 132/88   01/23/25 146/100   12/21/24 (!) 138/102       Health Maintenance   Topic Date Due    INFLUENZA VACCINE  03/31/2025 (Originally 7/1/2024)    Pneumococcal Vaccine 0-64 (1 of 2 - PCV) 11/07/2025 (Originally 3/13/2001)    COVID-19 Vaccine (1 - 2024-25 season) 02/07/2026 (Originally 9/1/2024)    BMI FOLLOWUP  10/03/2025    ANNUAL PHYSICAL  11/07/2025    TDAP/TD VACCINES (2 - Td or Tdap) 03/02/2033    HEPATITIS C SCREENING  Completed       /88   Pulse 88   Temp 97.7 °F (36.5 °C)   Ht 182.9 cm (72\")   Wt 92 kg (202 lb 12.8 oz)   SpO2 96%   BMI 27.50 kg/m²       Current Outpatient Medications:     cetirizine (zyrTEC) 10 MG tablet, Take 1 tablet by mouth Every Night. Indications: Hayfever, Disp: 90 tablet, Rfl: 3    ferrous sulfate 325 (65 FE) MG tablet, Take 1 tablet by mouth Daily With Breakfast., Disp: , Rfl:     losartan (Cozaar) 50 MG tablet, Take 1 tablet by mouth 2 (Two) Times a Day. Indications: High Blood Pressure, Disp: 180 tablet, Rfl: 1    pantoprazole (Protonix) 40 MG EC tablet, Take 1 tablet by mouth Daily., Disp: 90 tablet, Rfl: 1    sertraline (Zoloft) 50 MG tablet, Take 1 tablet by mouth Daily. Indications: Generalized Anxiety Disorder, Disp: 90 tablet, Rfl: 0   Past Medical History:   Diagnosis Date    Crohn's disease         Physical Exam  Vitals reviewed.   Constitutional:       Appearance: Normal appearance. He is well-developed.   Neck:      Thyroid: No thyroid mass, thyromegaly or thyroid tenderness.   Cardiovascular:      Rate and Rhythm: Normal rate and regular rhythm.      Heart sounds: No murmur heard.     No friction rub. No gallop.   Pulmonary:      Effort: Pulmonary effort is normal.      Breath sounds: Normal breath sounds. No wheezing or rhonchi.   Lymphadenopathy:      Cervical: No cervical adenopathy.   Skin:     General: Skin is warm and dry.   Neurological:      Mental Status: He is alert and oriented to person, place, and time.      Cranial " Nerves: No cranial nerve deficit.   Psychiatric:         Mood and Affect: Mood and affect normal.         Behavior: Behavior normal.         Thought Content: Thought content normal. Thought content does not include homicidal or suicidal ideation. Thought content does not include homicidal or suicidal plan.         Judgment: Judgment normal.          Result Review :    The following data was reviewed by: VENECIA Hsieh on 02/07/2025:  Common Labs   Common labs          12/21/2024    11:07 12/23/2024    17:04 1/3/2025    09:45   Common Labs   Glucose 103  101  105    BUN 5  5  4    Creatinine 0.87  0.94  0.99    Sodium 141  139  141    Potassium 3.4  4.0  3.8    Chloride 107  104  105    Calcium 8.7  9.2  9.1    Albumin 3.6  3.9  3.7    Total Bilirubin 0.2  <0.2  0.3    Alkaline Phosphatase 113  134  116    AST (SGOT) 14  18  12    ALT (SGPT) 12  15  9    WBC 8.78  10.65     Hemoglobin 14.9  16.2     Hematocrit 46.7  47.4     Platelets 310  366     Total Cholesterol  144     Triglycerides  166     HDL Cholesterol  29     LDL Cholesterol   86         Assessment & Plan  Primary hypertension  Blood pressure is stable on losartan 50 mg twice daily, will continue current dose.  Recommend that he check his blood pressure at home.  He does not have a blood pressure cuff.  I we will have him see the hypertension clinic today.  He will follow-up with me in 3 months or sooner if any new or worsening symptoms.    Orders:    losartan (Cozaar) 50 MG tablet; Take 1 tablet by mouth 2 (Two) Times a Day. Indications: High Blood Pressure    Seasonal allergic rhinitis, unspecified trigger  Allergies are stable on Zyrtec, will continue current dose.  Orders:    cetirizine (zyrTEC) 10 MG tablet; Take 1 tablet by mouth Every Night. Indications: Hayfever    DESTINEE (generalized anxiety disorder)  Psychological condition is newly identified.  I will start him on Zoloft 50 mg daily.  We discussed drug efficacy and potential side  effects.  Psychological condition  will be reassessed in 3 months.    Orders:    sertraline (Zoloft) 50 MG tablet; Take 1 tablet by mouth Daily. Indications: Generalized Anxiety Disorder          Diagnosis Plan   1. Primary hypertension  losartan (Cozaar) 50 MG tablet      2. Seasonal allergic rhinitis, unspecified trigger  cetirizine (zyrTEC) 10 MG tablet      3. DESTINEE (generalized anxiety disorder)  sertraline (Zoloft) 50 MG tablet            FOLLOW UP  Return in about 3 months (around 5/7/2025) for Next scheduled follow up.  Patient was given instructions and counseling regarding his condition or for health maintenance advice. Please see specific information pulled into the AVS if appropriate.       CURRENT & DISCONTINUED MEDICATIONS  Current Outpatient Medications   Medication Instructions    cetirizine (ZYRTEC) 10 mg, Oral, Nightly    ferrous sulfate 325 mg, Daily With Breakfast    losartan (COZAAR) 50 mg, Oral, 2 Times Daily    pantoprazole (PROTONIX) 40 mg, Oral, Daily    sertraline (ZOLOFT) 50 mg, Oral, Daily       Medications Discontinued During This Encounter   Medication Reason    ondansetron ODT (ZOFRAN-ODT) 4 MG disintegrating tablet *Therapy completed    cetirizine (zyrTEC) 10 MG tablet Reorder    losartan (Cozaar) 50 MG tablet Reorder        Parts of this note are electronic transcriptions/translations of spoken language to printed text using the Dragon Dictation system.    Catherine Roach, VENECIA  02/07/25  14:25 EST

## 2025-02-27 ENCOUNTER — ANESTHESIA EVENT (OUTPATIENT)
Dept: GASTROENTEROLOGY | Facility: HOSPITAL | Age: 45
End: 2025-02-27
Payer: COMMERCIAL

## 2025-02-27 ENCOUNTER — TELEPHONE (OUTPATIENT)
Dept: GASTROENTEROLOGY | Facility: CLINIC | Age: 45
End: 2025-02-27
Payer: COMMERCIAL

## 2025-02-27 NOTE — TELEPHONE ENCOUNTER
Hub staff attempted to follow warm transfer process and was unsuccessful     Caller: PERI VALDES     Relationship to patient: SELF     Best call back number: [phone]     Patient is needing: PT IS NEEDING TO RESCHEDULE HIS COLONOSCOPY AND EGD FOR TOMORROW 2/28/25 PLEASE ADVISE AND CALL PT BACK

## 2025-02-27 NOTE — ANESTHESIA PREPROCEDURE EVALUATION
Anesthesia Evaluation     Patient summary reviewed and Nursing notes reviewed   NPO Solid Status: > 8 hours  NPO Liquid Status: > 4 hours           Airway   Mallampati: III  TM distance: >3 FB  Neck ROM: full  No difficulty expected  Dental    (+) poor dentition    Comment: Most teeth broken      Pulmonary - normal exam    breath sounds clear to auscultation  (+) a smoker Current, Smoked day of surgery,  Cardiovascular - normal exam    Rhythm: regular  Rate: normal    (+) hypertension well controlled less than 2 medications      Neuro/Psych  (+) psychiatric history Depression  GI/Hepatic/Renal/Endo    (+) GERD, renal disease- stones    ROS Comment: Crohns disease    Musculoskeletal     Abdominal    Substance History      OB/GYN          Other        ROS/Med Hx Other: Sinus rhythm  ST elev, probable normal early repol pattern  When compared with ECG of 09-Jan-2020 17:58:08,  No significant change  Electronically Signed By: Lester Freedman ([facility]) 2024-12-26 09:06:14  Date and Time of Study:2024-12-21 11:19:58                  Anesthesia Plan    ASA 2     general   total IV anesthesia  (Total IV Anesthesia    Patient understands anesthesia not responsible for dental damage.    Discussed risks with pt including aspiration, allergic reactions, apnea, advanced airway placement. Pt verbalized understanding. All questions answered.      )  intravenous induction     Anesthetic plan, risks, benefits, and alternatives have been provided, discussed and informed consent has been obtained with: patient.    Plan discussed with CRNA.      CODE STATUS:

## 2025-02-28 ENCOUNTER — ANESTHESIA (OUTPATIENT)
Dept: GASTROENTEROLOGY | Facility: HOSPITAL | Age: 45
End: 2025-02-28
Payer: COMMERCIAL

## 2025-03-05 NOTE — TELEPHONE ENCOUNTER
Called pt to see if I could get him r/s'd. No answer. Left message for pt to call back.     Postponing Patient Call until 3.6.25

## 2025-03-05 NOTE — TELEPHONE ENCOUNTER
Chris MEEK Hesham  1980     Patient requested to reschedule their EGD & Colonoscopy. I have offered to reschedule this patient and patient has agreed. Patient has been rescheduled to 5.1.2025.    Original date of case request: 2.28.2025    This procedure was ordered by VENECIA Cr for an important reason. I have thoroughly discussed with the patient that there are risks associated with not proceeding with the procedure at this time such as a delay in diagnosis, risk of incurable disease, or cancer. Patient verbalized understanding the risks discussed.    Patient plans to call us back to reschedule: No    Updated clearance needed?: No    Is the patient currently on any injectable medications for weight loss or diabetes? No    If yes, are they aware that they will need to discontinue this 7 days prior to their procedure? No    Has endo scheduling been notified? Yes    If yes, who did you speak to? Juan    Has the case request been updated? Yes

## 2025-04-02 NOTE — ASSESSMENT & PLAN NOTE
[facility]                                     Progress Notes  Patient Name: Kristy Carney  Date: 25         Service Type: Individual      Session Start Time: 4:02p Session End Time: 4:22p     Session Length:  20    Session #: 9    Attendees: Client attended alone    Service Modality:  Video Visit:      Provider verified identity through the following two step process.  Patient provided:  Patient     Telemedicine Visit: The patient's condition can be safely assessed and treated via synchronous audio and visual telemedicine encounter.      Reason for Telemedicine Visit: Services only offered telehealth    Originating Site (Patient Location): Patient's home    Distant Site (Provider Location): Provider Remote Setting- Home Office    Consent:  The patient/guardian has verbally consented to: the potential risks and benefits of telemedicine (video visit) versus in person care; bill my insurance or make self-payment for services provided; and responsibility for payment of non-covered services.     Patient would like the video invitation sent by:  Send to e-mail at: [email or web address]    Mode of Communication:  Video Conference via [employer]    Distant Location (Provider):  Off-site    As the provider I attest to compliance with applicable laws and regulations related to telemedicine.    DATA  Interactive Complexity: No  Crisis: No        Progress Since Last Session (Related to Symptoms / Goals / Homework):   Symptoms: NO change, mood around 10    Homework: Completed in session      Episode of Care Goals: Minimal progress - PREPARATION (Decided to change - considering how); Intervened by negotiating a change plan and determining options / strategies for behavior change, identifying triggers, exploring social supports, and working towards setting a date to begin behavior change     Current / Ongoing Stressors and Concerns:   Last session 3/4, pt currently on spring break and had a great  I discussed with him that he needs to have a referral to gastroenterology to monitor his Crohn's disease.  He has seen Dr. Blackman in the past.  Patient is agreeable, order placed.   Updated Dr. Fernandes of patient's blood glucose level of 415. Order received to give 10 units humalog as base and add 12 units of humalog as correction dose for a total of 22 units.     birthday. School has been going well overall, attending all days for the full day, no major challenges with peers of schoolwork. Pt staying consistent with taking weight med and not having any major issues.      Treatment Objective(s) Addressed in This Session:   participate in 1 activities to improve mood       Intervention:   Motivational Interviewing    MI Intervention: Permission to raise concern or advise and Open-ended questions     Change Talk Expressed by the Patient: Reasons to change Need to change    Provider Response to Change Talk: A - Affirmed patient's thoughts, decisions, or attempts at behavior change and R - Reflected patient's change talk     ASSESSMENT: Current Emotional / Mental Status (status of significant symptoms):   Risk status (Self / Other harm or suicidal ideation)   Patient denies current fears or concerns for personal safety.   Patient denies current or recent suicidal ideation or behaviors.   Patient denies current or recent homicidal ideation or behaviors.   Patient denies current or recent self injurious behavior or ideation.   Patient denies other safety concerns.   Patient reports there has been no change in risk factors since their last session.     Patient reports there has been no change in protective factors since their last session.     Recommended that patient call 911 or go to the local ED should there be a change in any of these risk factors     Appearance:   Appropriate    Eye Contact:   Fair    Psychomotor Behavior: Normal    Attitude:   Guarded    Orientation:   All   Speech    Rate / Production: Slow     Volume:  Soft    Mood:    Normal   Affect:    Flat    Thought Content:  Clear    Thought Form:  Coherent  Logical    Insight:    Fair      Medication Review:   No current psychiatric medications prescribed     Medication Compliance:   NA     Changes in Health Issues:   None reported     Chemical Use Review:   Substance Use: Chemical use reviewed, no active concerns  identified      Tobacco Use: No current tobacco use.      Diagnosis:  Adjustment disorder with depressed mood    Collateral Reports Completed:   Not Applicable    PLAN: (Patient Tasks / Therapist Tasks / Other)  Daily exercise    ARIA Helms   4/2/25                                                         ______________________________________________________________________    Individual Treatment Plan    Patient's Name: Kristy Carney  YOB: 2008    Date of Creation: 2/5/24  Date Treatment Plan Last Reviewed/Revised: 5/5/24    DSM5 Diagnoses: Adjustment Disorders  309.0 (F43.21) With depressed mood  Psychosocial / Contextual Factors:   PROMIS (reviewed every 90 days):     Referral / Collaboration:  Referral to another professional/service is not indicated at this time..    Anticipated number of session for this episode of care: 3-6 sessions  Anticipation frequency of session: Biweekly  Anticipated Duration of each session: 38-52 minutes  Treatment plan will be reviewed in 90 days or when goals have been changed.       MeasurableTreatment Goal(s) related to diagnosis / functional impairment(s)  Goal 1: Patient will engage in healthy coping skills for mood improvement    Objective #A (Patient Action)    Patient will identify 1 stressors which contribute to feelings of anxiety.  Status: Continued: 2/5/24    Intervention(s)  Therapist will teach emotional regulation skills.   .    Patient has reviewed and agreed to the above plan.      ARIA Helms 2/5/24

## 2025-04-16 ENCOUNTER — TELEPHONE (OUTPATIENT)
Dept: GASTROENTEROLOGY | Facility: CLINIC | Age: 45
End: 2025-04-16
Payer: COMMERCIAL

## 2025-04-16 NOTE — TELEPHONE ENCOUNTER
Dr. Blackman advised that he will need to be out of the office on 5.1.25.      Patient has an upcoming ENDO procedure. I called patient to reschedule. No answer. Left message for patient to call back.      Procedure: EGD/Colonoscopy        Colonoscopy was DUE in February. Pt has r/s'd procedure twice.

## 2025-04-18 ENCOUNTER — CLINICAL SUPPORT (OUTPATIENT)
Dept: FAMILY MEDICINE CLINIC | Facility: CLINIC | Age: 45
End: 2025-04-18
Payer: COMMERCIAL

## 2025-04-18 VITALS
HEIGHT: 72 IN | HEART RATE: 81 BPM | DIASTOLIC BLOOD PRESSURE: 94 MMHG | SYSTOLIC BLOOD PRESSURE: 150 MMHG | BODY MASS INDEX: 27.5 KG/M2 | WEIGHT: 203 LBS

## 2025-04-18 DIAGNOSIS — I10 PRIMARY HYPERTENSION: Primary | ICD-10-CM

## 2025-04-18 NOTE — PROGRESS NOTES
Patient seen for 2ND Encounter [facility].  Education provided through [facility] Curriculum including Aim to Move.    Patient brought logs to encounter. Patient agreed to continue monitoring BP twice daily and to bring logs to subsequent encounters.  BP logs shared with office provider via Soundtracker.      Hypertension Management: Discussed blood pressure readings and provided the CARE record log for tracking BP measurements. Educated patient on the importance of routine monitoring and the need to follow up with their primary care provider (PCP) to discuss further management.    Signs & Symptoms of Hypertensive Emergencies: Educated patient on recognizing warning signs such as chest pain, shortness of breath, and stroke symptoms and instructed to seek immediate medical attention (call 911) if these occur.    Patient agreed to continue monitoring blood pressure and follow up with PCP as advised.    Encouraged adherence to heart-healthy lifestyle modifications and to report any concerning symptoms.    Patient agreed to regular follow ups with their healthcare provider for monitoring of medications and further healthcare management, if needed.    Additional Visit Details: Pt states he doesn't need help, just complaining of rising cost of everything    Additional Resources Provided: Resistance Band    Next Visit Lifestyle Modification Topic: Sleep And Heart Health    Next Visit Date & Time:6/11/2025 @ [facility]    GABO Schmid

## 2025-04-30 DIAGNOSIS — F41.1 GAD (GENERALIZED ANXIETY DISORDER): ICD-10-CM

## 2025-05-01 ENCOUNTER — OFFICE VISIT (OUTPATIENT)
Dept: FAMILY MEDICINE CLINIC | Facility: CLINIC | Age: 45
End: 2025-05-01
Payer: COMMERCIAL

## 2025-05-01 VITALS
HEIGHT: 72 IN | DIASTOLIC BLOOD PRESSURE: 100 MMHG | TEMPERATURE: 97 F | WEIGHT: 203.9 LBS | BODY MASS INDEX: 27.62 KG/M2 | SYSTOLIC BLOOD PRESSURE: 160 MMHG | OXYGEN SATURATION: 96 % | HEART RATE: 85 BPM

## 2025-05-01 DIAGNOSIS — F41.1 GAD (GENERALIZED ANXIETY DISORDER): ICD-10-CM

## 2025-05-01 DIAGNOSIS — D50.9 IRON DEFICIENCY ANEMIA, UNSPECIFIED IRON DEFICIENCY ANEMIA TYPE: ICD-10-CM

## 2025-05-01 DIAGNOSIS — I10 PRIMARY HYPERTENSION: Primary | ICD-10-CM

## 2025-05-01 LAB
ANION GAP SERPL CALCULATED.3IONS-SCNC: 9.5 MMOL/L (ref 5–15)
BASOPHILS # BLD AUTO: 0.06 10*3/MM3 (ref 0–0.2)
BASOPHILS NFR BLD AUTO: 0.6 % (ref 0–1.5)
BUN SERPL-MCNC: 4 MG/DL (ref 6–20)
BUN/CREAT SERPL: 4.2 (ref 7–25)
CALCIUM SPEC-SCNC: 9.3 MG/DL (ref 8.6–10.5)
CHLORIDE SERPL-SCNC: 105 MMOL/L (ref 98–107)
CO2 SERPL-SCNC: 22.5 MMOL/L (ref 22–29)
CREAT SERPL-MCNC: 0.96 MG/DL (ref 0.76–1.27)
DEPRECATED RDW RBC AUTO: 44.2 FL (ref 37–54)
EGFRCR SERPLBLD CKD-EPI 2021: 100.6 ML/MIN/1.73
EOSINOPHIL # BLD AUTO: 0.36 10*3/MM3 (ref 0–0.4)
EOSINOPHIL NFR BLD AUTO: 3.9 % (ref 0.3–6.2)
ERYTHROCYTE [DISTWIDTH] IN BLOOD BY AUTOMATED COUNT: 13.7 % (ref 12.3–15.4)
FERRITIN SERPL-MCNC: 425 NG/ML (ref 30–400)
FOLATE SERPL-MCNC: 4.16 NG/ML (ref 4.78–24.2)
GLUCOSE SERPL-MCNC: 78 MG/DL (ref 65–99)
HCT VFR BLD AUTO: 50.8 % (ref 37.5–51)
HGB BLD-MCNC: 16.7 G/DL (ref 13–17.7)
IMM GRANULOCYTES # BLD AUTO: 0.02 10*3/MM3 (ref 0–0.05)
IMM GRANULOCYTES NFR BLD AUTO: 0.2 % (ref 0–0.5)
IRON 24H UR-MRATE: 65 MCG/DL (ref 59–158)
IRON SATN MFR SERPL: 19 % (ref 20–50)
LYMPHOCYTES # BLD AUTO: 3.16 10*3/MM3 (ref 0.7–3.1)
LYMPHOCYTES NFR BLD AUTO: 34.1 % (ref 19.6–45.3)
MCH RBC QN AUTO: 29.2 PG (ref 26.6–33)
MCHC RBC AUTO-ENTMCNC: 32.9 G/DL (ref 31.5–35.7)
MCV RBC AUTO: 89 FL (ref 79–97)
MONOCYTES # BLD AUTO: 0.69 10*3/MM3 (ref 0.1–0.9)
MONOCYTES NFR BLD AUTO: 7.5 % (ref 5–12)
NEUTROPHILS NFR BLD AUTO: 4.97 10*3/MM3 (ref 1.7–7)
NEUTROPHILS NFR BLD AUTO: 53.7 % (ref 42.7–76)
NRBC BLD AUTO-RTO: 0 /100 WBC (ref 0–0.2)
PLATELET # BLD AUTO: 350 10*3/MM3 (ref 140–450)
PMV BLD AUTO: 10.2 FL (ref 6–12)
POTASSIUM SERPL-SCNC: 3.7 MMOL/L (ref 3.5–5.2)
RBC # BLD AUTO: 5.71 10*6/MM3 (ref 4.14–5.8)
SODIUM SERPL-SCNC: 137 MMOL/L (ref 136–145)
TIBC SERPL-MCNC: 346 MCG/DL (ref 298–536)
TRANSFERRIN SERPL-MCNC: 232 MG/DL (ref 200–360)
VIT B12 BLD-MCNC: 159 PG/ML (ref 211–946)
WBC NRBC COR # BLD AUTO: 9.26 10*3/MM3 (ref 3.4–10.8)

## 2025-05-01 PROCEDURE — 82746 ASSAY OF FOLIC ACID SERUM: CPT | Performed by: NURSE PRACTITIONER

## 2025-05-01 PROCEDURE — 83540 ASSAY OF IRON: CPT | Performed by: NURSE PRACTITIONER

## 2025-05-01 PROCEDURE — 85025 COMPLETE CBC W/AUTO DIFF WBC: CPT | Performed by: NURSE PRACTITIONER

## 2025-05-01 PROCEDURE — 80048 BASIC METABOLIC PNL TOTAL CA: CPT | Performed by: NURSE PRACTITIONER

## 2025-05-01 PROCEDURE — 82728 ASSAY OF FERRITIN: CPT | Performed by: NURSE PRACTITIONER

## 2025-05-01 PROCEDURE — 82607 VITAMIN B-12: CPT | Performed by: NURSE PRACTITIONER

## 2025-05-01 PROCEDURE — 84466 ASSAY OF TRANSFERRIN: CPT | Performed by: NURSE PRACTITIONER

## 2025-05-01 RX ORDER — AMLODIPINE BESYLATE 5 MG/1
5 TABLET ORAL DAILY
Qty: 90 TABLET | Refills: 0 | Status: SHIPPED | OUTPATIENT
Start: 2025-05-01

## 2025-05-01 NOTE — ASSESSMENT & PLAN NOTE
Orders:    sertraline (Zoloft) 50 MG tablet; Take 1 tablet by mouth Daily. Indications: Generalized Anxiety Disorder

## 2025-05-01 NOTE — ASSESSMENT & PLAN NOTE
Orders:    amLODIPine (NORVASC) 5 MG tablet; Take 1 tablet by mouth Daily. Indications: High Blood Pressure    Basic Metabolic Panel    CBC Auto Differential

## 2025-05-01 NOTE — PROGRESS NOTES
Chief Complaint  Anxiety and Hypertension    Patient or patient representative verbalized consent for the use of Ambient Listening during the visit with  VENECIA Hsieh for chart documentation. 5/1/2025  14:07 EDT    Subjective            Chris Dahl is a 45 y.o. male who presents to [facility]   History of Present Illness  History of Present Illness  The patient presents for a follow-up on hypertension and anxiety.    Blood pressure remains uncontrolled despite a regimen of losartan 50 mg twice daily, with readings consistently ranging from 150 to 160 systolic and 80 to 90 diastolic. Active monitoring through the [facility] has shown high numbers, although a few days had lower readings without identifiable changes in routine. One more visit at the Hypertension Clinic is scheduled.    A refill of sertraline is requested, which is reported to be effective in managing anxiety symptoms without adverse effects.    Pantoprazole is taken for stomach issues, ferrous sulfate for iron, and Zyrtec. A colonoscopy scheduled for this morning was rescheduled. Over-the-counter iron supplements are taken, and iron levels have not been rechecked.      Tobacco Use: High Risk (5/1/2025)    Patient History     Smoking Tobacco Use: Every Day     Smokeless Tobacco Use: Never     Passive Exposure: Past      E-cigarette/Vaping    E-cigarette/Vaping Use Never User      E-cigarette/Vaping Substances    Nicotine No     THC No     CBD No     Flavoring No      E-cigarette/Vaping Devices    Disposable No     Pre-filled or Refillable Cartridge No     Refillable Tank No     Pre-filled Pod No        Alcohol Use: Not At Risk (11/10/2024)    AUDIT-C     Frequency of Alcohol Consumption: Monthly or less     Average Number of Drinks: 1 or 2     Frequency of Binge Drinking: Never         Objective   Vital Signs:   Vitals:    05/01/25 1352 05/01/25 1357   BP: (!) 161/108 160/100   BP Location: Left  "arm    Patient Position: Sitting    Cuff Size: Adult    Pulse: 85    Temp: 97 °F (36.1 °C)    SpO2: 96%    Weight: 92.5 kg (203 lb 14.4 oz)    Height: 182.9 cm (72\")    PainSc: 0-No pain      Body mass index is 27.65 kg/m².    Wt Readings from Last 3 Encounters:   05/01/25 92.5 kg (203 lb 14.4 oz)   04/18/25 92.1 kg (203 lb)   02/07/25 92 kg (202 lb 12.8 oz)     BP Readings from Last 3 Encounters:   05/01/25 160/100   04/18/25 150/94   02/07/25 132/88       Health Maintenance   Topic Date Due    Pneumococcal Vaccine 0-49 (1 of 2 - PCV) 11/07/2025 (Originally 3/13/1999)    COVID-19 Vaccine (1 - 2024-25 season) 02/07/2026 (Originally 9/1/2024)    INFLUENZA VACCINE  07/01/2025    ANNUAL PHYSICAL  11/07/2025    TDAP/TD VACCINES (2 - Td or Tdap) 03/02/2033    HEPATITIS C SCREENING  Completed       /100   Pulse 85   Temp 97 °F (36.1 °C)   Ht 182.9 cm (72\")   Wt 92.5 kg (203 lb 14.4 oz)   SpO2 96%   BMI 27.65 kg/m²       Current Outpatient Medications:     cetirizine (zyrTEC) 10 MG tablet, Take 1 tablet by mouth Every Night. Indications: Hayfever, Disp: 90 tablet, Rfl: 3    ferrous sulfate 325 (65 FE) MG tablet, Take 1 tablet by mouth Daily With Breakfast., Disp: , Rfl:     losartan (Cozaar) 50 MG tablet, Take 1 tablet by mouth 2 (Two) Times a Day. Indications: High Blood Pressure, Disp: 180 tablet, Rfl: 1    pantoprazole (Protonix) 40 MG EC tablet, Take 1 tablet by mouth Daily., Disp: 90 tablet, Rfl: 1    sertraline (Zoloft) 50 MG tablet, Take 1 tablet by mouth Daily. Indications: Generalized Anxiety Disorder, Disp: 90 tablet, Rfl: 1    amLODIPine (NORVASC) 5 MG tablet, Take 1 tablet by mouth Daily. Indications: High Blood Pressure, Disp: 90 tablet, Rfl: 0   Past Medical History:   Diagnosis Date    Crohn's disease         Physical Exam  Vitals reviewed.   Constitutional:       Appearance: Normal appearance. He is well-developed.   Neck:      Thyroid: No thyroid mass, thyromegaly or thyroid tenderness. "   Cardiovascular:      Rate and Rhythm: Normal rate and regular rhythm.      Heart sounds: No murmur heard.     No friction rub. No gallop.   Pulmonary:      Effort: Pulmonary effort is normal.      Breath sounds: Normal breath sounds. No wheezing or rhonchi.   Lymphadenopathy:      Cervical: No cervical adenopathy.   Skin:     General: Skin is warm and dry.   Neurological:      Mental Status: He is alert and oriented to person, place, and time.      Cranial Nerves: No cranial nerve deficit.   Psychiatric:         Mood and Affect: Mood and affect normal.         Behavior: Behavior normal.         Thought Content: Thought content normal. Thought content does not include homicidal or suicidal ideation.         Judgment: Judgment normal.            Physical Exam        Result Review :    The following data was reviewed by: VENECIA Hsieh on 05/01/2025:            Liver  Result Date: 1/6/2025  Impression: Stable 2 mm gallbladder polyp. Electronically Signed: Pat Bundy MD  1/6/2025 10:41 AM EST  Workstation ID: [id number]      Results      Assessment & Plan  Primary hypertension      Orders:    amLODIPine (NORVASC) 5 MG tablet; Take 1 tablet by mouth Daily. Indications: High Blood Pressure    Basic Metabolic Panel    CBC Auto Differential    DESTINEE (generalized anxiety disorder)      Orders:    sertraline (Zoloft) 50 MG tablet; Take 1 tablet by mouth Daily. Indications: Generalized Anxiety Disorder    Iron deficiency anemia, unspecified iron deficiency anemia type    Orders:    CBC Auto Differential    Iron Profile    Ferritin    Vitamin B12 & Folate         Assessment & Plan  1. Hypertension.  - Blood pressure remains uncontrolled despite being on the maximum dose of losartan (50 mg twice a day).  - Reports consistent readings of 150-160/80-90 mmHg.  - Amlodipine 5 mg daily will be added to the regimen to help control blood pressure. Prescription for amlodipine will be sent to [facility].  - Will continue with  the current dosage of losartan.    2. Anxiety.  - Currently on sertraline 50 mg daily, which is reported to be working well without any noticeable side effects.  - No new side effects noted.  - Refill for sertraline will be provided and sent to [facility].    3. Health maintenance.  - On pantoprazole for stomach, ferrous sulfate for iron, and Zyrtec.  - Blood work will be conducted today to assess kidney function, complete blood count (CBC), and iron levels.  - Advised to wear sunscreen on the arm due to sun exposure while driving.    Follow-up  - Follow-up in 3 months to monitor blood pressure and overall health status.           Diagnosis Plan   1. Primary hypertension  amLODIPine (NORVASC) 5 MG tablet    Basic Metabolic Panel    CBC Auto Differential      2. DESTINEE (generalized anxiety disorder)  sertraline (Zoloft) 50 MG tablet      3. Iron deficiency anemia, unspecified iron deficiency anemia type  CBC Auto Differential    Iron Profile    Ferritin    Vitamin B12 & Folate            FOLLOW UP  Return in about 3 months (around 8/1/2025) for Recheck HTN.  Patient was given instructions and counseling regarding his condition or for health maintenance advice. Please see specific information pulled into the AVS if appropriate.       CURRENT & DISCONTINUED MEDICATIONS  Current Outpatient Medications   Medication Instructions    amLODIPine (NORVASC) 5 mg, Oral, Daily    cetirizine (ZYRTEC) 10 mg, Oral, Nightly    ferrous sulfate 325 mg, Daily With Breakfast    losartan (COZAAR) 50 mg, Oral, 2 Times Daily    pantoprazole (PROTONIX) 40 mg, Oral, Daily    sertraline (ZOLOFT) 50 mg, Oral, Daily       Medications Discontinued During This Encounter   Medication Reason    sertraline (Zoloft) 50 MG tablet Reorder        Parts of this note are electronic transcriptions/translations of spoken language to printed text using the Dragon Dictation system.    Catherine Roach, APRN  05/01/25  14:14 EDT

## 2025-05-02 ENCOUNTER — RESULTS FOLLOW-UP (OUTPATIENT)
Dept: FAMILY MEDICINE CLINIC | Facility: CLINIC | Age: 45
End: 2025-05-02
Payer: COMMERCIAL

## 2025-05-02 DIAGNOSIS — E53.8 FOLATE DEFICIENCY: Primary | ICD-10-CM

## 2025-05-02 DIAGNOSIS — D51.0 PERNICIOUS ANEMIA: ICD-10-CM

## 2025-05-02 RX ORDER — SYRINGE W-NEEDLE,DISPOSAB,3 ML 25GX5/8"
SYRINGE, EMPTY DISPOSABLE MISCELLANEOUS
Qty: 10 EACH | Refills: 0 | Status: SHIPPED | OUTPATIENT
Start: 2025-05-02

## 2025-05-02 RX ORDER — CYANOCOBALAMIN 1000 UG/ML
INJECTION, SOLUTION INTRAMUSCULAR; SUBCUTANEOUS
Qty: 10 ML | Refills: 0 | Status: SHIPPED | OUTPATIENT
Start: 2025-05-02

## 2025-05-02 RX ORDER — FOLIC ACID 1 MG/1
1 TABLET ORAL DAILY
Qty: 90 TABLET | Refills: 1 | Status: SHIPPED | OUTPATIENT
Start: 2025-05-02

## 2025-05-02 RX ORDER — CYANOCOBALAMIN 1000 UG/ML
1000 INJECTION, SOLUTION INTRAMUSCULAR; SUBCUTANEOUS ONCE
Status: SHIPPED | OUTPATIENT
Start: 2025-05-02

## 2025-05-02 NOTE — TELEPHONE ENCOUNTER
Pt has someone at home that can give him the shots. He wants to know if he can come by here so we can show them how to give the first shot. Please advise

## 2025-05-06 ENCOUNTER — CLINICAL SUPPORT (OUTPATIENT)
Dept: FAMILY MEDICINE CLINIC | Facility: CLINIC | Age: 45
End: 2025-05-06
Payer: COMMERCIAL

## 2025-05-06 DIAGNOSIS — E53.8 VITAMIN B 12 DEFICIENCY: Primary | ICD-10-CM

## 2025-05-06 PROCEDURE — 96372 THER/PROPH/DIAG INJ SC/IM: CPT | Performed by: NURSE PRACTITIONER

## 2025-05-06 RX ADMIN — CYANOCOBALAMIN 1000 MCG: 1000 INJECTION, SOLUTION INTRAMUSCULAR; SUBCUTANEOUS at 15:29

## 2025-05-06 NOTE — PROGRESS NOTES
Pt came in for b12 shot. Shot was given in right deltoid,tolerated well. Pt waited 15 min with no reactions.

## 2025-05-12 ENCOUNTER — HOSPITAL ENCOUNTER (OUTPATIENT)
Facility: HOSPITAL | Age: 45
Setting detail: HOSPITAL OUTPATIENT SURGERY
Discharge: HOME | End: 2025-05-12
Attending: INTERNAL MEDICINE | Admitting: INTERNAL MEDICINE
Payer: COMMERCIAL

## 2025-05-12 VITALS
TEMPERATURE: 97.4 F | RESPIRATION RATE: 23 BRPM | DIASTOLIC BLOOD PRESSURE: 95 MMHG | SYSTOLIC BLOOD PRESSURE: 145 MMHG | OXYGEN SATURATION: 95 % | HEART RATE: 71 BPM | BODY MASS INDEX: 27.87 KG/M2 | WEIGHT: 205.47 LBS

## 2025-05-12 DIAGNOSIS — R12 HEARTBURN: ICD-10-CM

## 2025-05-12 DIAGNOSIS — K50.10 CROHN'S DISEASE OF LARGE INTESTINE WITHOUT COMPLICATION: ICD-10-CM

## 2025-05-12 PROCEDURE — 25010000002 FENTANYL CITRATE (PF) 50 MCG/ML SOLUTION: Performed by: NURSE ANESTHETIST, CERTIFIED REGISTERED

## 2025-05-12 PROCEDURE — 25010000002 LIDOCAINE PF 2% 2 % SOLUTION: Performed by: NURSE ANESTHETIST, CERTIFIED REGISTERED

## 2025-05-12 PROCEDURE — 25010000002 PROPOFOL 10 MG/ML EMULSION: Performed by: NURSE ANESTHETIST, CERTIFIED REGISTERED

## 2025-05-12 PROCEDURE — 25810000003 LACTATED RINGERS PER 1000 ML: Performed by: NURSE ANESTHETIST, CERTIFIED REGISTERED

## 2025-05-12 PROCEDURE — 88305 TISSUE EXAM BY PATHOLOGIST: CPT | Performed by: INTERNAL MEDICINE

## 2025-05-12 RX ORDER — SODIUM CHLORIDE, SODIUM LACTATE, POTASSIUM CHLORIDE, CALCIUM CHLORIDE 600; 310; 30; 20 MG/100ML; MG/100ML; MG/100ML; MG/100ML
30 INJECTION, SOLUTION INTRAVENOUS CONTINUOUS
Status: DISCONTINUED | OUTPATIENT
Start: 2025-05-12 | End: 2025-05-12 | Stop reason: HOSPADM

## 2025-05-12 RX ORDER — FENTANYL CITRATE 50 UG/ML
INJECTION, SOLUTION INTRAMUSCULAR; INTRAVENOUS AS NEEDED
Status: DISCONTINUED | OUTPATIENT
Start: 2025-05-12 | End: 2025-05-12 | Stop reason: SURG

## 2025-05-12 RX ORDER — LIDOCAINE HYDROCHLORIDE 20 MG/ML
INJECTION, SOLUTION EPIDURAL; INFILTRATION; INTRACAUDAL; PERINEURAL AS NEEDED
Status: DISCONTINUED | OUTPATIENT
Start: 2025-05-12 | End: 2025-05-12 | Stop reason: SURG

## 2025-05-12 RX ORDER — PROPOFOL 10 MG/ML
VIAL (ML) INTRAVENOUS AS NEEDED
Status: DISCONTINUED | OUTPATIENT
Start: 2025-05-12 | End: 2025-05-12 | Stop reason: SURG

## 2025-05-12 RX ADMIN — FENTANYL CITRATE 50 MCG: 50 INJECTION, SOLUTION INTRAMUSCULAR; INTRAVENOUS at 09:41

## 2025-05-12 RX ADMIN — PROPOFOL 40 MG: 10 INJECTION, EMULSION INTRAVENOUS at 09:36

## 2025-05-12 RX ADMIN — PROPOFOL 100 MG: 10 INJECTION, EMULSION INTRAVENOUS at 09:34

## 2025-05-12 RX ADMIN — PROPOFOL 175 MCG/KG/MIN: 10 INJECTION, EMULSION INTRAVENOUS at 09:34

## 2025-05-12 RX ADMIN — PROPOFOL 30 MG: 10 INJECTION, EMULSION INTRAVENOUS at 09:38

## 2025-05-12 RX ADMIN — LIDOCAINE HYDROCHLORIDE 50 MG: 20 INJECTION, SOLUTION EPIDURAL; INFILTRATION; INTRACAUDAL; PERINEURAL at 09:34

## 2025-05-12 RX ADMIN — FENTANYL CITRATE 50 MCG: 50 INJECTION, SOLUTION INTRAMUSCULAR; INTRAVENOUS at 09:37

## 2025-05-12 RX ADMIN — PROPOFOL 40 MG: 10 INJECTION, EMULSION INTRAVENOUS at 09:59

## 2025-05-12 RX ADMIN — SODIUM CHLORIDE, POTASSIUM CHLORIDE, SODIUM LACTATE AND CALCIUM CHLORIDE 30 ML/HR: 600; 310; 30; 20 INJECTION, SOLUTION INTRAVENOUS at 08:45

## 2025-05-12 NOTE — H&P
Pre Procedure History & Physical    Chief Complaint:   History of Crohn's    Subjective     HPI:   History of Crohn's    Past Medical History:   Past Medical History:   Diagnosis Date    Crohn's disease        Past Surgical History:  Past Surgical History:   Procedure Laterality Date    ABDOMINAL SURGERY      partial colonectomy    APPENDECTOMY      COLONOSCOPY      COLONOSCOPY N/A 2/22/2024    Procedure: COLONOSCOPY WITH BIOPSIES;  Surgeon: Adis Blackman MD;  Location: [facility] ENDOSCOPY;  Service: Gastroenterology;  Laterality: N/A;  TERMINAL ILEUM ULCER, PREVIOUS SURGERY, HEMORRHOIDS       Family History:  Family History   Problem Relation Age of Onset    Crohn's disease Paternal Aunt     Colon cancer Neg Hx        Social History:   reports that he has been smoking cigarettes. He has a 37.5 pack-year smoking history. He has been exposed to tobacco smoke. He has never used smokeless tobacco. He reports current alcohol use. He reports that he does not use drugs.    Medications:   Medications Prior to Admission   Medication Sig Dispense Refill Last Dose/Taking    amLODIPine (NORVASC) 5 MG tablet Take 1 tablet by mouth Daily. Indications: High Blood Pressure 90 tablet 0     cetirizine (zyrTEC) 10 MG tablet Take 1 tablet by mouth Every Night. Indications: Hayfever 90 tablet 3     cyanocobalamin 1000 MCG/ML injection Give 1 mL IM daily for 5 doses, then weekly for 4 doses and then monthly.  Indications: Pernicious Anemia 10 mL 0     ferrous sulfate 325 (65 FE) MG tablet Take 1 tablet by mouth Daily With Breakfast.       folic acid (FOLVITE) 1 MG tablet Take 1 tablet by mouth Daily. Indications: Anemia From Inadequate Folic Acid 90 tablet 1     losartan (Cozaar) 50 MG tablet Take 1 tablet by mouth 2 (Two) Times a Day. Indications: High Blood Pressure 180 tablet 1     pantoprazole (Protonix) 40 MG EC tablet Take 1 tablet by mouth Daily. 90 tablet 1     sertraline (Zoloft) 50 MG tablet Take 1 tablet by mouth Daily.  "Indications: Generalized Anxiety Disorder 90 tablet 1     Syringe 23G X 1\" 3 ML misc To be used with B12 injections as directed  Indications: Pernicious Anemia 10 each 0        Allergies:  Patient has no known allergies.        Objective     Weight 93.2 kg (205 lb 7.5 oz).    Physical Exam   Constitutional: Pt is oriented to person, place, and time and well-developed, well-nourished, and in no distress.   Mouth/Throat: Oropharynx is clear and moist.   Neck: Normal range of motion.   Cardiovascular: Normal rate, regular rhythm and normal heart sounds.    Pulmonary/Chest: Effort normal and breath sounds normal.   Abdominal: Soft. Nontender  Skin: Skin is warm and dry.   Psychiatric: Mood, memory, affect and judgment normal.     Assessment & Plan     Diagnosis:  Crohn's    Anticipated Surgical Procedure:  Colonoscopy    The risks, benefits, and alternatives of this procedure have been discussed with the patient or the responsible party- the patient understands and agrees to proceed.            "

## 2025-05-12 NOTE — ANESTHESIA POSTPROCEDURE EVALUATION
Patient: Chris Dahl    Procedure Summary       Date: 05/12/25 Room / Location: [facility] ENDOSCOPY 4 / [facility] ENDOSCOPY    Anesthesia Start: 0931 Anesthesia Stop: 1011    Procedures:       ESOPHAGOGASTRODUODENOSCOPY WITH BIOPSIES      COLONOSCOPY WITH BIOPSIES AND COLD SNARE POLYPECTOMY Diagnosis:       Crohn's disease of large intestine without complication      Heartburn      (Crohn's disease of large intestine without complication [K50.10])      (Heartburn [R12])    Surgeons: Adis Blackman MD Provider: Vikas Soto CRNA    Anesthesia Type: general ASA Status: 2            Anesthesia Type: general    Vitals  Vitals Value Taken Time   /95 05/12/25 10:31   Temp 36.3 °C (97.4 °F) 05/12/25 10:31   Pulse 71 05/12/25 10:31   Resp 23 05/12/25 10:31   SpO2 95 % 05/12/25 10:31           Post Anesthesia Care and Evaluation    Patient location during evaluation: bedside  Patient participation: complete - patient participated  Level of consciousness: awake  Pain management: adequate    Airway patency: patent  Anesthetic complications: No anesthetic complications  PONV Status: controlled  Cardiovascular status: acceptable and stable  Respiratory status: acceptable

## 2025-05-15 ENCOUNTER — RESULTS FOLLOW-UP (OUTPATIENT)
Dept: GASTROENTEROLOGY | Facility: HOSPITAL | Age: 45
End: 2025-05-15
Payer: COMMERCIAL

## 2025-05-19 ENCOUNTER — SPECIALTY PHARMACY (OUTPATIENT)
Dept: OTHER | Facility: HOSPITAL | Age: 45
End: 2025-05-19
Payer: COMMERCIAL

## 2025-05-19 NOTE — TELEPHONE ENCOUNTER
Patient was supposed to receive his last Skyrizi infusion on 3/6/25. He did not get. Does he need to restart induction doses?

## 2025-05-19 NOTE — TELEPHONE ENCOUNTER
Patient is scheduled for last Skyrizi infusion 5/28/25 @ 2:00. Patient is aware that he will need to arrive at 1:45.    Please obtain PA for Skyrizi OBI.

## 2025-05-20 RX ORDER — PANTOPRAZOLE SODIUM 40 MG/1
40 TABLET, DELAYED RELEASE ORAL DAILY
Qty: 90 TABLET | Refills: 1 | Status: SHIPPED | OUTPATIENT
Start: 2025-05-20

## 2025-05-20 RX ORDER — RISANKIZUMAB-RZAA 360 MG/2.4
360 WEARABLE INJECTOR SUBCUTANEOUS
Qty: 2.4 ML | Refills: 3 | Status: SHIPPED | OUTPATIENT
Start: 2025-05-20

## 2025-05-20 NOTE — TELEPHONE ENCOUNTER
Medication Requested Protonix 40 MG     Last Refill 11/21/24    Last OV 11/21/24, double on 05/12/25    Next OV 06/19/25    Medication pended for approval and correct pharmacy verified Yes

## 2025-05-28 ENCOUNTER — HOSPITAL ENCOUNTER (OUTPATIENT)
Dept: INFUSION THERAPY | Facility: HOSPITAL | Age: 45
Discharge: HOME | End: 2025-05-28
Admitting: NURSE PRACTITIONER
Payer: COMMERCIAL

## 2025-05-28 VITALS
TEMPERATURE: 97.9 F | RESPIRATION RATE: 20 BRPM | HEART RATE: 81 BPM | DIASTOLIC BLOOD PRESSURE: 80 MMHG | WEIGHT: 209.66 LBS | BODY MASS INDEX: 28.4 KG/M2 | HEIGHT: 72 IN | OXYGEN SATURATION: 97 % | SYSTOLIC BLOOD PRESSURE: 145 MMHG

## 2025-05-28 DIAGNOSIS — K50.819 CROHN'S DISEASE OF SMALL AND LARGE INTESTINES WITH COMPLICATION: Primary | ICD-10-CM

## 2025-05-28 PROCEDURE — 25810000003 SODIUM CHLORIDE 0.9 % SOLUTION 250 ML FLEX CONT: Performed by: NURSE PRACTITIONER

## 2025-05-28 PROCEDURE — 25010000002 RISANKIZUMAB-RZAA 600 MG/10ML SOLUTION 10 ML VIAL: Performed by: NURSE PRACTITIONER

## 2025-05-28 PROCEDURE — 96365 THER/PROPH/DIAG IV INF INIT: CPT

## 2025-05-28 RX ORDER — DIPHENHYDRAMINE HYDROCHLORIDE 50 MG/ML
50 INJECTION, SOLUTION INTRAMUSCULAR; INTRAVENOUS ONCE
OUTPATIENT
Start: 2025-06-12 | End: 2025-06-12

## 2025-05-28 RX ADMIN — SODIUM CHLORIDE 600 MG: 9 INJECTION, SOLUTION INTRAVENOUS at 13:53

## 2025-06-11 ENCOUNTER — CLINICAL SUPPORT (OUTPATIENT)
Dept: FAMILY MEDICINE CLINIC | Facility: CLINIC | Age: 45
End: 2025-06-11
Payer: COMMERCIAL

## 2025-06-11 VITALS
SYSTOLIC BLOOD PRESSURE: 127 MMHG | HEART RATE: 79 BPM | DIASTOLIC BLOOD PRESSURE: 87 MMHG | HEIGHT: 72 IN | WEIGHT: 211 LBS | BODY MASS INDEX: 28.58 KG/M2

## 2025-06-11 DIAGNOSIS — I10 PRIMARY HYPERTENSION: Primary | ICD-10-CM

## 2025-06-11 NOTE — PROGRESS NOTES
Patient seen for 3rd Encounter [facility].  Education provided through [facility] Curriculum including Sleep and Heart Health .    Patient brought logs to encounter. Patient has completed [facility] and feels fairly confident (8/10 on scale) that he can manage his BP with tools, medication ordered by his PCP and information provided,  pt made aware that he can return to the [facility] program if desired.   Patient agreed to continue monitoring BP twice daily and to bring logs to visit with provider.  BP logs shared with office provider via [employer].      Hypertension Management: Discussed blood pressure readings and provided the CARE record log for tracking BP measurements. Educated patient on the importance of routine monitoring and the need to follow up with their primary care provider (PCP) to discuss further management.    Signs & Symptoms of Hypertensive Emergencies: Educated patient on recognizing warning signs such as chest pain, shortness of breath, and stroke symptoms and instructed to seek immediate medical attention (call 911) if these occur.    Patient agreed to continue monitoring blood pressure and follow up with PCP as advised.    Encouraged adherence to heart-healthy lifestyle modifications and to report any concerning symptoms.    Patient agreed to regular follow ups with their healthcare provider for monitoring of medications and further healthcare management, if needed.    Additional Visit Details: SDOH reviewed patient states he doesn't need any additional assistance at this time    Additional Resources Provided: none    Next Visit Lifestyle Modification Topic: N/A    Next Visit Date & Time:N/A    Thu Schmid RN

## 2025-06-16 ENCOUNTER — SPECIALTY PHARMACY (OUTPATIENT)
Dept: OTHER | Facility: HOSPITAL | Age: 45
End: 2025-06-16
Payer: COMMERCIAL

## 2025-06-16 NOTE — PROGRESS NOTES
Specialty Pharmacy Patient Management Program  Gastroenterology Initial Assessment     Chris Dahl is a 45 y.o. male referred by their Gastroenterology provider to the Gastroenterology Patient Management program offered by [facility] for Crohn's Disease on 06/16/25. An initial outreach was conducted, including assessment of therapy appropriateness and specialty medication education for Skyrizi (risankizumab). The patient was introduced to services offered by [facility], including: regular assessments, refill coordination, curbside pick-up or mail order delivery options, prior authorization maintenance, and financial assistance programs as applicable. The patient was also provided with contact information for the pharmacy team.     Insurance Coverage & Financial Support  PA obtained, copay card added     Relevant Past Medical History and Comorbidities  Relevant medical history and concomitant health conditions were discussed with the patient. The patient's chart has been reviewed for relevant past medical history and comorbid health conditions and updated as necessary.   Past Medical History:   Diagnosis Date    Crohn's disease      Social History     Socioeconomic History    Marital status:    Tobacco Use    Smoking status: Every Day     Current packs/day: 1.50     Average packs/day: 1.5 packs/day for 25.0 years (37.5 ttl pk-yrs)     Types: Cigarettes     Passive exposure: Past    Smokeless tobacco: Never   Vaping Use    Vaping status: Never Used   Substance and Sexual Activity    Alcohol use: Yes     Comment: occ    Drug use: Never    Sexual activity: Defer     Problem list reviewed by Carmen Carson RPH on 6/16/2025 at 10:40 AM    Allergies  Known allergies and reactions were discussed with the patient. The patient's chart has been reviewed for allergy information and updated as necessary.   Patient has no known allergies.  Allergies reviewed by Alli  WINTER Morales on 6/16/2025 at 10:40 AM    Relevant Laboratory Values  Lab Results   Component Value Date    GLUCOSE 78 05/01/2025    BUN 4 (L) 05/01/2025    CREATININE 0.96 05/01/2025    BCR 4.2 (L) 05/01/2025     05/01/2025    K 3.7 05/01/2025     05/01/2025    CO2 22.5 05/01/2025    CALCIUM 9.3 05/01/2025    PROTEINTOT 6.9 01/03/2025    ALBUMIN 3.7 01/03/2025    ALT 9 01/03/2025    AST 12 01/03/2025    ALKPHOS 116 01/03/2025    BILITOT 0.3 01/03/2025    ANIONGAP 9.5 05/01/2025    MG 1.8 12/21/2024     Lab Results   Component Value Date    WBC 9.26 05/01/2025    HGB 16.7 05/01/2025    HCT 50.8 05/01/2025     05/01/2025    INR 0.97 06/18/2024     Lab Results   Component Value Date    HEPBCAB Negative 12/23/2024    HEPBSAB Non-Reactive 12/23/2024    HEPBSAG Non-Reactive 12/23/2024    HEPCVIRUSABY Non-Reactive 03/02/2023    [id number] Non-Reactive 12/23/2024     Lab Results   Component Value Date    QUANTITBGLDP Negative 12/23/2024    QUANTITBGLDP Negative 06/12/2024     Lab Value Review  The above lab values have been reviewed; the following specialty medication(s) dose adjustment(s) are recommended: none.    Current Medication List  This medication list has been reviewed with the patient and evaluated for any interactions or necessary modifications/recommendations, and updated to include all prescription medications, OTC medications, and supplements the patient is currently taking. This list reflects what is contained in the patient's profile, which has also been marked as reviewed to communicate to other providers it is the most up to date version of the patient's current medication therapy.     Current Outpatient Medications:     amLODIPine (NORVASC) 5 MG tablet, Take 1 tablet by mouth Daily. Indications: High Blood Pressure, Disp: 90 tablet, Rfl: 0    cetirizine (zyrTEC) 10 MG tablet, Take 1 tablet by mouth Every Night. Indications: Hayfever, Disp: 90 tablet, Rfl: 3    cyanocobalamin 1000 MCG/ML  "injection, Give 1 mL IM daily for 5 doses, then weekly for 4 doses and then monthly.  Indications: Pernicious Anemia, Disp: 10 mL, Rfl: 0    folic acid (FOLVITE) 1 MG tablet, Take 1 tablet by mouth Daily. Indications: Anemia From Inadequate Folic Acid, Disp: 90 tablet, Rfl: 1    losartan (Cozaar) 50 MG tablet, Take 1 tablet by mouth 2 (Two) Times a Day. Indications: High Blood Pressure, Disp: 180 tablet, Rfl: 1    pantoprazole (PROTONIX) 40 MG EC tablet, TAKE 1 TABLET BY MOUTH EVERY DAY, Disp: 90 tablet, Rfl: 1    Risankizumab-rzaa (Skyrizi) 360 MG/2.4ML solution cartridge, Inject 360 mg under the skin into the appropriate area as directed Every 8 (Eight) Weeks. Indications: Crohn's Disease, Disp: 2.4 mL, Rfl: 3    sertraline (Zoloft) 50 MG tablet, Take 1 tablet by mouth Daily. Indications: Generalized Anxiety Disorder, Disp: 90 tablet, Rfl: 1    Syringe 23G X 1\" 3 ML misc, To be used with B12 injections as directed  Indications: Pernicious Anemia, Disp: 10 each, Rfl: 0  No current facility-administered medications for this visit.  Medicines reviewed by Carmen Carson RPH on 6/16/2025 at 10:40 AM    Drug Interactions  none     Initial Education Provided for Specialty Medication  The patient has been provided with the following education and any applicable administration techniques (i.e. self-injection) have been demonstrated for the therapies indicated. All questions and concerns have been addressed prior to the patient receiving the medication, and the patient has verbalized understanding of the education and any materials provided. Additional patient education shall be provided and documented upon request by the patient, provider, or payer.         Skyrizi (Risankizumab)         Medication Expectations   Why am I taking this medication? You are taking this medication for either moderate to severe plaque psoriasis or psoriatic arthritis.   What should I expect while on this medication? After 24 weeks of taking " skyrizi, plaque psoriasis patients reported skin to be 90% clearer. For arthritis patients, a majority of patients noticed improvement and significant relief in fingers and joints.   How does the medication work? Risankizumab-rzaa is a humanized immunoglobulin G1 (IgG1) monoclonal antibody that selectively binds to the p19 subunit of human interleukin 23 (IL-23) cytokine and inhibits its interaction with the IL-23 receptor. IL-23 is a naturally occurring cytokine that is involved in inflammatory and immune responses. Risankizumab-rzaa inhibits the release of pro-inflammatory cytokines and chemokines.   How long will I be on this medication for? The amount of time you will be on this medication will be determined by your doctor and your response to the medication.    How do I take this medication? Take as directed on your prescription label. There is an initial loading dose at week 0 and 4, then routinely every 12 weeks therafter. Skyrizi is a subcutaneous injection. It may be administered in the stomach, upper thigh, or upper outer arm. Allow to sit out of the refrigerator for 30 to 90 minutes for the pen and 15 to 30 minutes for the syringe before injecting.   What are some possible side effects? Injection site reactions or hypersensitivity reactions, headache, tiredness, upper respiratory tract injection, or fungal skin injection.   What happens if I miss a dose? If you miss a dose, take it as soon as you remember.            Medication Safety   What are things I should warn my doctor immediately about? Allergic reaction such has hives or trouble breathing. If you develop symptoms of infection, such as a cough or fever, that do not go away, weight loss, changes in how often you urinate, changes in sweating, muscle pain or weakness, or severe dizziness.     What are things that I should be cautious of? Injection site reaction, headache, and fatigue. You may have more chance of getting an infection. Wash your hands  often and stay away from people with infections, colds, or flu.   What are some medications that can interact with this one? Immunosuppressants and vaccines            Medication Storage/Handling   How should I handle this medication? Keep this medicine out of reach of pets and children. Keep the product in the original carton to protect from light until time of use. Do not shake or freeze.  Do not use if: solution contains large particles or is cloudy or discolored; solution has been frozen; prefilled pen or syringe has been dropped or damaged; carton perforations are broken.  Do not inject where the skin is tender, bruised, red, hard, or affected by psoriasis. Rotate injection sites.   How does this medication need to be stored? Store in refrigerator and keep dry. If needed, you may store at room temperature for up to 24 hours but do not return to the refrigerator if unused.    How should I dispose of this medication? You can dispose of the empty syringe in a sharps container, and if this is not available you may use an empty hard plastic container such as a milk jug or detergent container. Discard any unused portion or if stored outside of refrigerator > 24 hours.            Resources/Support   How can I remind myself to take this medication? You can download a reminder zoltan on your phone or use a calandar to help remember your next injection.   Is financial support available?  Yes, [employer] provides co-pay cards if you have commercial insurance or [employer] patient assistance if you have Medicare or no insurance.    Which vaccines are recommended for me? Talk to your doctor about these vaccines: Flu, Coronavirus (COVID-19), Pneumococcal (pneumonia), Tdap, Hepatitis B, Zoster (shingles)                 Adherence and Self-Administration  Adherence related to the patient's specialty therapy was discussed with the patient. The adherence segment of this outreach has been reviewed and updated.  Is there a concern  with patient's ability to self administer the medication correctly and without issue?: No  Were any potential barriers to adherence identified during the initial assessment or patient education?: No  Are there any concerns regarding the patient's understanding of the importance of medication adherence?: No  Methods for Supporting Patient Adherence and/or Self-Administration: Patient advised to watch training video, and [facility] nurse has reached out.      Open Medication Therapy Problems  No medication therapy recommendations to display    Goals of Therapy  Goals related to the patient's specialty therapy were discussed with the patient. The Patient Goals segment of this outreach has been reviewed and updated.   Goals Addressed Today    None         Reassessment Plan & Follow-Up  Medication Therapy Changes: Start Skyrizi Obi 6/25/25  Related Plans, Therapy Recommendations, or Therapy Problems to Be Addressed: none   Pharmacist to perform regular reassessments no more than (6) months from the previous assessment.  Care Coordinator to set up future refill outreaches, coordinate prescription delivery, and escalate clinical questions to pharmacist.   Welcome information and patient satisfaction survey to be sent by specialty pharmacy team with patient's initial fill.    Attestation  Therapeutic appropriateness: Appropriate   I attest the patient was actively involved in and has agreed to the above plan of care. If the prescribed therapy is at any point deemed not appropriate based on the current or future assessments, a consultation will be initiated with the patient's specialty care provider to determine the best course of action. The revised plan of therapy will be documented along with any required assessments and/or additional patient education provided.     Carmen Carson, EmmaD  Clinical Specialty Pharmacist, Gastroenterology  06/16/25 10:41 EDT

## 2025-06-19 ENCOUNTER — LAB (OUTPATIENT)
Facility: HOSPITAL | Age: 45
End: 2025-06-19
Payer: COMMERCIAL

## 2025-06-19 ENCOUNTER — OFFICE VISIT (OUTPATIENT)
Dept: GASTROENTEROLOGY | Facility: CLINIC | Age: 45
End: 2025-06-19
Payer: COMMERCIAL

## 2025-06-19 VITALS
HEART RATE: 87 BPM | BODY MASS INDEX: 28.44 KG/M2 | DIASTOLIC BLOOD PRESSURE: 84 MMHG | HEIGHT: 72 IN | SYSTOLIC BLOOD PRESSURE: 143 MMHG | WEIGHT: 210 LBS

## 2025-06-19 DIAGNOSIS — K50.10 CROHN'S DISEASE OF LARGE INTESTINE WITHOUT COMPLICATION: ICD-10-CM

## 2025-06-19 DIAGNOSIS — K50.10 CROHN'S DISEASE OF LARGE INTESTINE WITHOUT COMPLICATION: Primary | ICD-10-CM

## 2025-06-19 DIAGNOSIS — K56.609 SMALL BOWEL OBSTRUCTION: ICD-10-CM

## 2025-06-19 LAB
ALBUMIN SERPL-MCNC: 4 G/DL (ref 3.5–5.2)
ALP SERPL-CCNC: 129 U/L (ref 39–117)
ALT SERPL W P-5'-P-CCNC: 17 U/L (ref 1–41)
AST SERPL-CCNC: 16 U/L (ref 1–40)
BILIRUB CONJ SERPL-MCNC: <0.1 MG/DL (ref 0–0.3)
BILIRUB INDIRECT SERPL-MCNC: ABNORMAL MG/DL
BILIRUB SERPL-MCNC: 0.3 MG/DL (ref 0–1.2)
INR PPP: 0.94 (ref 0.86–1.15)
PROT SERPL-MCNC: 7.3 G/DL (ref 6–8.5)
PROTHROMBIN TIME: 13 SECONDS (ref 11.8–14.9)

## 2025-06-19 PROCEDURE — 80076 HEPATIC FUNCTION PANEL: CPT

## 2025-06-19 PROCEDURE — 36415 COLL VENOUS BLD VENIPUNCTURE: CPT

## 2025-06-19 PROCEDURE — 85610 PROTHROMBIN TIME: CPT

## 2025-06-19 PROCEDURE — 86480 TB TEST CELL IMMUN MEASURE: CPT

## 2025-06-19 NOTE — PROGRESS NOTES
Patient Name: Chris Dahl   Visit Date: 06/19/2025   Patient ID: [id number]  Provider: VENECIA Aceves    Sex: male  Location:  Location Address:  Location Phone: [phone] [facility] [place] [address] [phone]    YOB: 1980  Age: 45 y.o.   Primary Care Provider Catherine Roach APRN      Referring Provider: No ref. provider found        Chief Complaint  Crohn's Disease    History of Present Illness  Initially established care in 2013. History of Crohn's diagnosis 2004. He had a colon resection about 1 year after diagnosis in [place], 2005. He previously managed Crohn's with [facility]. Biologic naive.  Colonoscopy 3/13/2013 by Dr. Blackman - evidence of right hemicolectomy, small hyperplastic polyp in rectum otherwise normal mucosa. Biopsies normal of terminal ileum, right colon, left colon, and rectum.   EGD/Colonoscopy 9/10/2015 by Dr. Blackman - LA grade D esophagitis with large ulcers in distal esophagus and hiatal hernia. Esophageal biopsies - active esophagitis with ulceration. Normal duodenum biopsies. Normal mucosa throughout colon with internal hemorrhoids and post surgical changes in right colon. Normal biopsies of right colon, left colon, and rectum.   EGD 1/07/2016 by Dr. Blackman - normal esophagus, hiatal hernia, normal stomach and duodenum. Esophageal biopsies - chronic inflammation.    Colonoscopy 2/22/2024: Good prep, internal hemorrhoids, grade 1, 6 mm healed ulcer noted in the sigmoid colon with biopsy taken-biopsy negative, patent ileocolonic anastomosis noted in the proximal transverse colon, neoterminal ileum contained a few 5 mm ulcers-biopsy with active inflammation, no granulomas no dysplasia, no bleeding was present     In November, patient had a ER visit with CT scan showing small bowel obstruction, patient last seen 11/21/2024, Skyrizi was discussed, patient was on prednisone taper    Patient started Skyrizi 12/20/2024    Ultrasound of the liver 1/3/2025:  Stable 2 mm gallbladder polyp, normal liver    EGD 5/12/2025: Small hiatal hernia, irregular Z-line, normal stomach and normal duodenum-GE junction biopsy with mild chronic inflammation otherwise negative  Colonoscopy 5/12/2025: Evidence of prior end-to-side ileocolonic anastomosis in the ascending colon patent and characterized by ulceration, biopsy shows ulcer and active inflammation otherwise negative, grade 1 internal hemorrhoids, small polyp in the sigmoid colon-benign and negative for adenoma it was removed.       History of Present Illness  The patient presents for evaluation of Crohn's disease.    She reports a general improvement in GI sx, with a decrease in pain and discomfort. He continues to experience diarrhea but notes a recent trend towards more solid stools. Intermittent cramps occur but typically resolve within an hour. Bowel movements average 2 per day, occasionally increasing to 3 or 4. Stool consistency is rated as  #5 with the first bowel movement of the day usually being solid, and subsequent ones being less so. No issues with heartburn are reported.    He initiated Skyrizi treatment in 12/2024 but had to postpone final infusion due to illness. The last infusion was administered in 05/2025. He is scheduled to start home doses on 06/25/2025. Education on the use of the on-body injector has been received, and he is set to start using it on 06/25/2025. No side effects from Skyrizi are reported, and he believes it has improved his condition. A healthy diet is maintained, with three meals a day.    He is considering applying for FMLA due to frequent doctor visits and illnesses, as she does not want to risk losing her job. A colonoscopy had to be rescheduled due to illness, which coincided with her scheduled infusion. He recalls having flu-like symptoms, including cough and congestion.    Previously, he was receiving B12 injections, with the last dose administered 2 weeks ago. An upcoming appointment  "with her primary care physician is scheduled for 08/01/2025. He is currently taking folic acid supplements.    SOCIAL HISTORY  Occupation: Working  Diet: Eating three times a day      Past Medical History:   Diagnosis Date    Crohn's disease        Past Surgical History:   Procedure Laterality Date    ABDOMINAL SURGERY      PARTIAL COLECTOMY    APPENDECTOMY      COLONOSCOPY      COLONOSCOPY N/A 02/22/2024    Procedure: COLONOSCOPY WITH BIOPSIES;  Surgeon: Adis Blackman MD;  Location: [facility] ENDOSCOPY;  Service: Gastroenterology;  Laterality: N/A;  TERMINAL ILEUM ULCER, PREVIOUS SURGERY, HEMORRHOIDS    COLONOSCOPY N/A 05/12/2025    Procedure: COLONOSCOPY WITH BIOPSIES AND COLD SNARE POLYPECTOMY;  Surgeon: Adis Blackman MD;  Location: [facility] ENDOSCOPY;  Service: Gastroenterology;  Laterality: N/A;  COLON ULCERS, PREVIOUS SURGERY, HEMORRHOIDS, COLON POLYP    ENDOSCOPY N/A 05/12/2025    Procedure: ESOPHAGOGASTRODUODENOSCOPY WITH BIOPSIES;  Surgeon: Adis Blackman MD;  Location: [facility] ENDOSCOPY;  Service: Gastroenterology;  Laterality: N/A;  HIATAL HERNIA, IRREGULAR Z LINE    UPPER GASTROINTESTINAL ENDOSCOPY         No Known Allergies    Family History   Problem Relation Age of Onset    Crohn's disease Paternal Aunt     Colon cancer Neg Hx         Social History     Tobacco Use    Smoking status: Every Day     Current packs/day: 1.50     Average packs/day: 1.5 packs/day for 25.0 years (37.5 ttl pk-yrs)     Types: Cigarettes     Passive exposure: Past    Smokeless tobacco: Never   Vaping Use    Vaping status: Never Used   Substance Use Topics    Alcohol use: Yes     Comment: occ    Drug use: Never       Objective     Vital Signs:   /84 (BP Location: Right arm, Patient Position: Sitting, Cuff Size: Adult)   Pulse 87   Ht 182.9 cm (72\")   Wt 95.3 kg (210 lb)   BMI 28.48 kg/m²       Physical Exam  Constitutional:       General: The patient is not in acute distress.     Appearance: Normal " appearance.   HENT:      Head: Normocephalic and atraumatic.      Nose: Nose normal.   Pulmonary:      Effort: Pulmonary effort is normal. No respiratory distress.   Abdominal:      General: Abdomen is flat.      Palpations: Abdomen is soft. There is no mass.      Tenderness: There is no abdominal tenderness. There is no guarding.   Musculoskeletal:      Cervical back: Neck supple.      Right lower leg: No edema.      Left lower leg: No edema.   Skin:     General: Skin is warm and dry.   Neurological:      General: No focal deficit present.      Mental Status: The patient is alert and oriented to person, place, and time.      Gait: Gait normal.   Psychiatric:         Mood and Affect: Mood normal.         Speech: Speech normal.         Behavior: Behavior normal.         Thought Content: Thought content normal.     Result Review :   The following data was reviewed by: VENECIA Aceves on 06/19/2025:    CBC w/diff          12/21/2024    11:07 12/23/2024    17:04 5/1/2025    14:22   CBC w/Diff   WBC 8.78  10.65  9.26    RBC 5.19  5.43  5.71    Hemoglobin 14.9  16.2  16.7    Hematocrit 46.7  47.4  50.8    MCV 90.0  87.3  89.0    MCH 28.7  29.8  29.2    MCHC 31.9  34.2  32.9    RDW 13.3  13.0  13.7    Platelets 310  366  350    Neutrophil Rel % 62.1  62.0  53.7    Immature Granulocyte Rel % 0.5  0.5  0.2    Lymphocyte Rel % 25.9  26.9  34.1    Monocyte Rel % 8.8  8.3  7.5    Eosinophil Rel % 2.1  1.8  3.9    Basophil Rel % 0.6  0.5  0.6      CMP          12/23/2024    17:04 1/3/2025    09:45 5/1/2025    14:22   CMP   Glucose 101  105  78    BUN 5  4  4    Creatinine 0.94  0.99  0.96    EGFR 103.8  97.5  100.6    Sodium 139  141  137    Potassium 4.0  3.8  3.7    Chloride 104  105  105    Calcium 9.2  9.1  9.3    Total Protein 7.0  6.9     Albumin 3.9  3.7     Globulin 3.1  3.2     Total Bilirubin <0.2  0.3     Alkaline Phosphatase 134  116     AST (SGOT) 18  12     ALT (SGPT) 15  9     Albumin/Globulin Ratio  1.3  1.2     BUN/Creatinine Ratio 5.3  4.0  4.2    Anion Gap 13.7  9.1  9.5        Iron Profile   Iron   Date Value Ref Range Status   05/01/2025 65 59 - 158 mcg/dL Final     TIBC   Date Value Ref Range Status   05/01/2025 346 298 - 536 mcg/dL Final     Iron Saturation (TSAT)   Date Value Ref Range Status   05/01/2025 19 (L) 20 - 50 % Final     Transferrin   Date Value Ref Range Status   05/01/2025 232 200 - 360 mg/dL Final     Ferritin   Ferritin   Date Value Ref Range Status   05/01/2025 425.00 (H) 30.00 - 400.00 ng/mL Final     ESR (Sed Rate)   Sed Rate   Date Value Ref Range Status   12/23/2024 20 (H) 0 - 15 mm/hr Final     CRP (C-Reactive)   C-Reactive Protein   Date Value Ref Range Status   12/23/2024 0.88 (H) 0.00 - 0.50 mg/dL Final               Assessment and Plan    Diagnoses and all orders for this visit:    1. Crohn's disease of large intestine without complication (Primary)  -     Hepatic Function Panel; Future  -     Protime-INR; Future  -     CBC Auto Differential; Future  -     Comprehensive Metabolic Panel; Future  -     Iron Profile w/o Ferritin; Future  -     C-reactive Protein; Future  -     Sedimentation Rate; Future  -     QuantiFERON TB Gold; Future    2. Small bowel obstruction  Comments:  HX Of          Assessment & Plan  1. Crohn's Disease:  - Reports improvement in diarrhea, with stools becoming more solid.  - Experiences occasional cramps resolving within an hour.  - Continue Skyrizi; last infusion received last month.  - Report any upper respiratory infections or other illnesses.  - Yearly influenza vaccine recommended.  - Colonoscopy planned for 05/2026.  - Contact clinic if experiencing increased stool frequency, loose or watery stools, presence of blood in stools, or intensified pain.  - Non-fasting liver enzyme tests to be conducted today.  - Lab orders for 12/2025 placed.    2. Vitamin B12 Deficiency:  - Previously low B12 levels; last injection administered 2 weeks ago.  - Start  over-the-counter B12 sublingual drops or tablets in 1 to 2 weeks to maintain B12 levels.    3. Folic Acid Deficiency:  - Previously low folic acid levels.  - Continue current folic acid supplementation.    Follow-up:  - Follow up in 6 months.            Follow Up   Return in about 6 months (around 12/19/2025).    Patient or patient representative verbalized consent for the use of Ambient Listening during the visit with  VENECIA Aceves for chart documentation. 6/19/2025  10:00 EDT    Patient was given instructions and counseling regarding his condition or for health maintenance advice. Please see specific information pulled into the AVS if appropriate.

## 2025-06-21 LAB
GAMMA INTERFERON BACKGROUND BLD IA-ACNC: 0.04 IU/ML
M TB IFN-G BLD-IMP: NEGATIVE
M TB IFN-G CD4+ BCKGRND COR BLD-ACNC: 0.04 IU/ML
M TB IFN-G CD4+CD8+ BCKGRND COR BLD-ACNC: 0.04 IU/ML
MITOGEN IGNF BCKGRD COR BLD-ACNC: >10 IU/ML
QUANTIFERON INCUBATION: NORMAL
SERVICE CMNT-IMP: NORMAL

## 2025-07-03 ENCOUNTER — TELEPHONE (OUTPATIENT)
Dept: GASTROENTEROLOGY | Facility: CLINIC | Age: 45
End: 2025-07-03
Payer: COMMERCIAL

## 2025-07-03 NOTE — TELEPHONE ENCOUNTER
Spoke to [place]-pharmacist at [facility] [phone]. Skyrizi OBI 360mg/2.4ml verified. Quantity of 1 with 5 refills.

## 2025-07-17 DIAGNOSIS — D51.0 PERNICIOUS ANEMIA: ICD-10-CM

## 2025-07-17 RX ORDER — SYRINGE W-NEEDLE,DISPOSAB,3 ML 25GX5/8"
SYRINGE, EMPTY DISPOSABLE MISCELLANEOUS
Qty: 3 EACH | Refills: 5 | Status: SHIPPED | OUTPATIENT
Start: 2025-07-17

## 2025-07-17 RX ORDER — CYANOCOBALAMIN 1000 UG/ML
1000 INJECTION, SOLUTION INTRAMUSCULAR; SUBCUTANEOUS
Qty: 1 ML | Refills: 5 | Status: SHIPPED | OUTPATIENT
Start: 2025-07-17

## 2025-08-01 ENCOUNTER — OFFICE VISIT (OUTPATIENT)
Dept: FAMILY MEDICINE CLINIC | Facility: CLINIC | Age: 45
End: 2025-08-01
Payer: COMMERCIAL

## 2025-08-01 VITALS
BODY MASS INDEX: 28.99 KG/M2 | HEIGHT: 72 IN | TEMPERATURE: 97.3 F | SYSTOLIC BLOOD PRESSURE: 120 MMHG | OXYGEN SATURATION: 94 % | DIASTOLIC BLOOD PRESSURE: 82 MMHG | WEIGHT: 214 LBS | HEART RATE: 84 BPM

## 2025-08-01 DIAGNOSIS — D51.0 PERNICIOUS ANEMIA: ICD-10-CM

## 2025-08-01 DIAGNOSIS — I10 PRIMARY HYPERTENSION: Primary | ICD-10-CM

## 2025-08-01 DIAGNOSIS — F41.1 GAD (GENERALIZED ANXIETY DISORDER): ICD-10-CM

## 2025-08-01 DIAGNOSIS — E53.8 FOLATE DEFICIENCY: ICD-10-CM

## 2025-08-01 RX ORDER — AMLODIPINE BESYLATE 5 MG/1
5 TABLET ORAL DAILY
Qty: 90 TABLET | Refills: 0 | Status: SHIPPED | OUTPATIENT
Start: 2025-08-01

## 2025-08-01 RX ORDER — LOSARTAN POTASSIUM 50 MG/1
50 TABLET ORAL 2 TIMES DAILY
Qty: 180 TABLET | Refills: 1 | Status: SHIPPED | OUTPATIENT
Start: 2025-08-01

## 2025-08-01 RX ORDER — FOLIC ACID 1 MG/1
1 TABLET ORAL DAILY
Qty: 90 TABLET | Refills: 1 | Status: SHIPPED | OUTPATIENT
Start: 2025-08-01

## 2025-08-01 NOTE — PROGRESS NOTES
Chief Complaint  Anxiety and Hypertension    Patient or patient representative verbalized consent for the use of Ambient Listening during the visit with  VENECIA Hsieh for chart documentation. 8/1/2025  15:57 EDT    Subjective            Chris Dahl is a 45 y.o. male who presents to [facility]   History of Present Illness  History of Present Illness  The patient presents for a follow-up visit.    He has not been monitoring his blood pressure at home due to misplacing his cuff. He attributes today's elevated reading to stress related to thinking about starting a new job. He reports no headaches or dizziness but mentions feeling more fatigued than usual. He is currently on amlodipine 5 mg and losartan 50 mg twice daily for blood pressure management.    He is also taking Zoloft 50 mg daily for anxiety, which he finds effective.    He continues his Skyrizi treatment for Crohn's disease, which has improved his condition and allowed him to consume a wider variety of foods. He reports feeling better overall and experiencing fewer stomach issues than before. He follows with gastroenterology for his Crohn's disease.  He has lab orders in place to get done in December.    He is on Protonix and folic acid. He is on Zyrtec for allergies.  He has pernicious anemia and was started on B12 injections but he misunderstood the instructions and thought he only had to take the 4 injections once a week for a month.  He did not understand that he is to take a monthly injection.  The prescription is available at the pharmacy.    Social History:  Marital Status:         Tobacco Use: High Risk (8/1/2025)    Patient History     Smoking Tobacco Use: Every Day     Smokeless Tobacco Use: Never     Passive Exposure: Past      E-cigarette/Vaping    E-cigarette/Vaping Use Never User      E-cigarette/Vaping Substances    Nicotine No     THC No     CBD No     Flavoring No   "    E-cigarette/Vaping Devices    Disposable No     Pre-filled or Refillable Cartridge No     Refillable Tank No     Pre-filled Pod No        Alcohol Use: Not At Risk (11/10/2024)    AUDIT-C     Frequency of Alcohol Consumption: Monthly or less     Average Number of Drinks: 1 or 2     Frequency of Binge Drinking: Never         Objective   Vital Signs:   Vitals:    08/01/25 1536 08/01/25 1541 08/01/25 1606   BP: 141/97 130/90 120/82   BP Location: Left arm     Patient Position: Sitting     Cuff Size: Adult     Pulse: 84     Temp: 97.3 °F (36.3 °C)     SpO2: 94%     Weight: 97.1 kg (214 lb)     Height: 182.9 cm (72\")     PainSc: 0-No pain       Body mass index is 29.02 kg/m².    Wt Readings from Last 3 Encounters:   08/01/25 97.1 kg (214 lb)   06/19/25 95.3 kg (210 lb)   06/11/25 95.7 kg (211 lb)     BP Readings from Last 3 Encounters:   08/01/25 120/82   06/19/25 143/84   06/11/25 127/87       Health Maintenance   Topic Date Due    Pneumococcal Vaccine 0-49 (1 of 2 - PCV) 11/07/2025 (Originally 3/13/1999)    COVID-19 Vaccine (1 - 2024-25 season) 02/07/2026 (Originally 9/1/2024)    INFLUENZA VACCINE  10/01/2025    ANNUAL PHYSICAL  11/07/2025    TDAP/TD VACCINES (2 - Td or Tdap) 03/02/2033    HEPATITIS C SCREENING  Completed       /82   Pulse 84   Temp 97.3 °F (36.3 °C)   Ht 182.9 cm (72\")   Wt 97.1 kg (214 lb)   SpO2 94%   BMI 29.02 kg/m²       Current Outpatient Medications:     amLODIPine (NORVASC) 5 MG tablet, Take 1 tablet by mouth Daily. Indications: High Blood Pressure, Disp: 90 tablet, Rfl: 0    cetirizine (zyrTEC) 10 MG tablet, Take 1 tablet by mouth Every Night. Indications: Hayfever, Disp: 90 tablet, Rfl: 3    cyanocobalamin 1000 MCG/ML injection, Inject 1 mL into the appropriate muscle as directed by prescriber Every 28 (Twenty-Eight) Days. Indications: Inadequate Vitamin B12, Disp: 1 mL, Rfl: 5    folic acid (FOLVITE) 1 MG tablet, Take 1 tablet by mouth Daily. Indications: Anemia From " "Inadequate Folic Acid, Disp: 90 tablet, Rfl: 1    losartan (Cozaar) 50 MG tablet, Take 1 tablet by mouth 2 (Two) Times a Day. Indications: High Blood Pressure, Disp: 180 tablet, Rfl: 1    pantoprazole (PROTONIX) 40 MG EC tablet, TAKE 1 TABLET BY MOUTH EVERY DAY, Disp: 90 tablet, Rfl: 1    Risankizumab-rzaa (Skyrizi) 360 MG/2.4ML solution cartridge, Inject 360 mg under the skin into the appropriate area as directed Every 8 (Eight) Weeks. Indications: Crohn's Disease, Disp: 2.4 mL, Rfl: 3    sertraline (Zoloft) 50 MG tablet, Take 1 tablet by mouth Daily. Indications: Generalized Anxiety Disorder, Disp: 90 tablet, Rfl: 1    Syringe 23G X 1\" 3 ML misc, To be used with B12 injections as directed  Indications: Pernicious Anemia, Disp: 3 each, Rfl: 5   Past Medical History:   Diagnosis Date    Crohn's disease         Physical Exam  Vitals reviewed.   Constitutional:       Appearance: Normal appearance. He is well-developed.   Neck:      Thyroid: No thyroid mass, thyromegaly or thyroid tenderness.   Cardiovascular:      Rate and Rhythm: Normal rate and regular rhythm.      Heart sounds: No murmur heard.     No friction rub. No gallop.   Pulmonary:      Effort: Pulmonary effort is normal.      Breath sounds: Normal breath sounds. No wheezing or rhonchi.   Lymphadenopathy:      Cervical: No cervical adenopathy.   Skin:     General: Skin is warm and dry.   Neurological:      Mental Status: He is alert and oriented to person, place, and time.      Cranial Nerves: No cranial nerve deficit.   Psychiatric:         Mood and Affect: Mood and affect normal.         Behavior: Behavior normal.         Thought Content: Thought content normal. Thought content does not include homicidal or suicidal ideation.         Judgment: Judgment normal.       Physical Exam        Result Review :    The following data was reviewed by: VENECIA Hsieh on 08/01/2025:      Results  Labs   - B12 levels: Low      Assessment & Plan  Primary " hypertension      Orders:    amLODIPine (NORVASC) 5 MG tablet; Take 1 tablet by mouth Daily. Indications: High Blood Pressure    losartan (Cozaar) 50 MG tablet; Take 1 tablet by mouth 2 (Two) Times a Day. Indications: High Blood Pressure    Lipid Panel; Future    TSH Rfx On Abnormal To Free T4; Future    Pernicious anemia    Orders:    Vitamin B12 & Folate; Future    DESTINEE (generalized anxiety disorder)      Orders:    sertraline (Zoloft) 50 MG tablet; Take 1 tablet by mouth Daily. Indications: Generalized Anxiety Disorder    TSH Rfx On Abnormal To Free T4; Future    Folate deficiency    Orders:    Vitamin B12 & Folate; Future    folic acid (FOLVITE) 1 MG tablet; Take 1 tablet by mouth Daily. Indications: Anemia From Inadequate Folic Acid         Assessment & Plan  1. Hypertension.  - Blood pressure readings have been consistently elevated, although today's reading was within the normal range at 120/82 mmHg upon rechecking.  - No symptoms such as headaches or dizziness reported; patient feels more tired than usual.  - Advised to monitor blood pressure regularly at home; patient will locate his blood pressure cuff.  - Continue current regimen of amlodipine 5 mg and losartan 50 mg twice daily; prescriptions sent to pharmacy.    2. Anxiety.  - Reports that Zoloft 50 mg daily is effectively managing anxiety.  - No need for dosage adjustment; patient confirms medication effectiveness.  - Refill for Zoloft 50 mg will be provided.  - Prescription sent to pharmacy.    3. Crohn's Disease.  - Currently on Skyrizi; significant improvement in symptoms reported.  - Patient feels better and is able to eat foods that were previously problematic.  - Continue Skyrizi as prescribed.    4. Vitamin B12 Deficiency.  - B12 levels were previously low; patient was on weekly injections for 4 weeks, now we will transition to monthly injections.  - Prescription for B12 injections sufficient for 6 months has been sent to pharmacy.  - B12 levels  "will be rechecked during next lab work.    5. Medication Management.  - Currently taking Protonix, folic acid, and Zyrtec for allergies.  - Refills for folic acid will be provided.  - Prescription sent to pharmacy.    6. Health Maintenance.  - Lab orders for Vitamin B12, folate, lipid panel, and thyroid levels will be placed.  - Advised to fast for at least 8 to 12 hours before lab work.  - Blood work expected around 12/01/2024, prior to gastroenterology appointment on 12/29/2024.    Follow-up: The patient will follow up in 6 months for his annual physical.          Diagnosis Plan   1. Primary hypertension  amLODIPine (NORVASC) 5 MG tablet    losartan (Cozaar) 50 MG tablet    Lipid Panel    TSH Rfx On Abnormal To Free T4      2. Pernicious anemia  Vitamin B12 & Folate      3. DESTINEE (generalized anxiety disorder)  sertraline (Zoloft) 50 MG tablet    TSH Rfx On Abnormal To Free T4      4. Folate deficiency  Vitamin B12 & Folate    folic acid (FOLVITE) 1 MG tablet            FOLLOW UP  Return in about 6 months (around 2/1/2026) for Annual physical.  Patient was given instructions and counseling regarding his condition or for health maintenance advice. Please see specific information pulled into the AVS if appropriate.       CURRENT & DISCONTINUED MEDICATIONS  Current Outpatient Medications   Medication Instructions    amLODIPine (NORVASC) 5 mg, Oral, Daily    cetirizine (ZYRTEC) 10 mg, Oral, Nightly    cyanocobalamin 1,000 mcg, Intramuscular, Every 28 Days    folic acid (FOLVITE) 1 mg, Oral, Daily    losartan (COZAAR) 50 mg, Oral, 2 Times Daily    pantoprazole (PROTONIX) 40 mg, Oral, Daily    sertraline (ZOLOFT) 50 mg, Oral, Daily    Skyrizi 360 mg, Subcutaneous, Every 8 Weeks    Syringe 23G X 1\" 3 ML misc To be used with B12 injections as directed       Medications Discontinued During This Encounter   Medication Reason    losartan (Cozaar) 50 MG tablet Reorder    sertraline (Zoloft) 50 MG tablet Reorder    amLODIPine " (NORVASC) 5 MG tablet Reorder    folic acid (FOLVITE) 1 MG tablet Reorder        Parts of this note are electronic transcriptions/translations of spoken language to printed text using the Dragon Dictation system.    VENECIA Hsieh  08/01/25  16:20 EDT

## 2025-08-01 NOTE — ASSESSMENT & PLAN NOTE
{Psychiatric illness (Optional):00425}    Orders:    sertraline (Zoloft) 50 MG tablet; Take 1 tablet by mouth Daily. Indications: Generalized Anxiety Disorder    TSH Rfx On Abnormal To Free T4; Future

## 2025-08-01 NOTE — ASSESSMENT & PLAN NOTE
{Hypertension is (optional):[id number]}    Orders:    amLODIPine (NORVASC) 5 MG tablet; Take 1 tablet by mouth Daily. Indications: High Blood Pressure    losartan (Cozaar) 50 MG tablet; Take 1 tablet by mouth 2 (Two) Times a Day. Indications: High Blood Pressure    Lipid Panel; Future    TSH Rfx On Abnormal To Free T4; Future

## 2025-08-07 ENCOUNTER — SPECIALTY PHARMACY (OUTPATIENT)
Dept: GASTROENTEROLOGY | Facility: CLINIC | Age: 45
End: 2025-08-07
Payer: COMMERCIAL

## 2025-08-13 ENCOUNTER — TELEPHONE (OUTPATIENT)
Dept: GASTROENTEROLOGY | Facility: CLINIC | Age: 45
End: 2025-08-13
Payer: COMMERCIAL

## 2025-08-15 ENCOUNTER — PATIENT ROUNDING (BHMG ONLY) (OUTPATIENT)
Dept: FAMILY MEDICINE CLINIC | Facility: CLINIC | Age: 45
End: 2025-08-15
Payer: COMMERCIAL

## (undated) DEVICE — Device: Brand: DEFENDO AIR/WATER/SUCTION AND BIOPSY VALVE

## (undated) DEVICE — DEFENDO AIR WATER SUCTION AND BIOPSY VALVE KIT FOR  OLYMPUS: Brand: DEFENDO AIR/WATER/SUCTION AND BIOPSY VALVE

## (undated) DEVICE — THE SINGLE USE ETRAP – POLYP TRAP IS USED FOR SUCTION RETRIEVAL OF ENDOSCOPICALLY REMOVED POLYPS.: Brand: ETRAP

## (undated) DEVICE — SOLIDIFIER LIQLOC PLS 1500CC BT

## (undated) DEVICE — Device

## (undated) DEVICE — BLCK/BITE BLOX WO/DENTL/RIM W/STRAP 54F

## (undated) DEVICE — CONN JET HYDRA H20 AUXILIARY DISP

## (undated) DEVICE — SOL IRRG H2O PL/BG 1000ML STRL

## (undated) DEVICE — SNAR E/S POLYP SNAREMASTER OVL/10MM 2.8X2300MM YEL

## (undated) DEVICE — LINER SURG CANSTR SXN S/RIGD 1500CC

## (undated) DEVICE — SINGLE-USE BIOPSY FORCEPS: Brand: RADIAL JAW 4